# Patient Record
Sex: FEMALE | Race: BLACK OR AFRICAN AMERICAN | Employment: FULL TIME | URBAN - METROPOLITAN AREA
[De-identification: names, ages, dates, MRNs, and addresses within clinical notes are randomized per-mention and may not be internally consistent; named-entity substitution may affect disease eponyms.]

---

## 2017-01-03 ENCOUNTER — HOSPITAL ENCOUNTER (OUTPATIENT)
Dept: PREADMISSION TESTING | Age: 57
Discharge: HOME | End: 2017-01-03
Payer: COMMERCIAL

## 2017-01-03 ENCOUNTER — HOSPITAL ENCOUNTER (OUTPATIENT)
Dept: GENERAL RADIOLOGY | Age: 57
Discharge: HOME | End: 2017-01-03
Payer: COMMERCIAL

## 2017-01-03 VITALS
DIASTOLIC BLOOD PRESSURE: 73 MMHG | WEIGHT: 228 LBS | HEART RATE: 72 BPM | SYSTOLIC BLOOD PRESSURE: 113 MMHG | BODY MASS INDEX: 36.64 KG/M2 | TEMPERATURE: 97.8 F | HEIGHT: 66 IN

## 2017-01-03 LAB
ABO + RH BLD: NORMAL
ALBUMIN SERPL BCP-MCNC: 3.4 G/DL (ref 3.5–5)
ALBUMIN/GLOB SERPL: 1 {RATIO} (ref 1.1–2.2)
ALP SERPL-CCNC: 105 U/L (ref 45–117)
ALT SERPL-CCNC: 26 U/L (ref 12–78)
ANION GAP BLD CALC-SCNC: 7 MMOL/L (ref 5–15)
AST SERPL W P-5'-P-CCNC: 17 U/L (ref 15–37)
ATRIAL RATE: 69 BPM
BASOPHILS # BLD AUTO: 0 K/UL (ref 0–0.1)
BASOPHILS # BLD: 1 % (ref 0–1)
BILIRUB SERPL-MCNC: 0.4 MG/DL (ref 0.2–1)
BLOOD GROUP ANTIBODIES SERPL: NORMAL
BUN SERPL-MCNC: 12 MG/DL (ref 6–20)
BUN/CREAT SERPL: 17 (ref 12–20)
CALCIUM SERPL-MCNC: 8.8 MG/DL (ref 8.5–10.1)
CALCULATED P AXIS, ECG09: 32 DEGREES
CALCULATED R AXIS, ECG10: -19 DEGREES
CALCULATED T AXIS, ECG11: -28 DEGREES
CHLORIDE SERPL-SCNC: 104 MMOL/L (ref 97–108)
CO2 SERPL-SCNC: 28 MMOL/L (ref 21–32)
CREAT SERPL-MCNC: 0.72 MG/DL (ref 0.55–1.02)
DIAGNOSIS, 93000: NORMAL
EOSINOPHIL # BLD: 0.4 K/UL (ref 0–0.4)
EOSINOPHIL NFR BLD: 6 % (ref 0–7)
ERYTHROCYTE [DISTWIDTH] IN BLOOD BY AUTOMATED COUNT: 12.8 % (ref 11.5–14.5)
GLOBULIN SER CALC-MCNC: 3.3 G/DL (ref 2–4)
GLUCOSE SERPL-MCNC: 127 MG/DL (ref 65–100)
HCT VFR BLD AUTO: 34.1 % (ref 35–47)
HGB BLD-MCNC: 10.6 G/DL (ref 11.5–16)
LYMPHOCYTES # BLD AUTO: 18 % (ref 12–49)
LYMPHOCYTES # BLD: 1.2 K/UL (ref 0.8–3.5)
MCH RBC QN AUTO: 27 PG (ref 26–34)
MCHC RBC AUTO-ENTMCNC: 31.1 G/DL (ref 30–36.5)
MCV RBC AUTO: 87 FL (ref 80–99)
MONOCYTES # BLD: 0.4 K/UL (ref 0–1)
MONOCYTES NFR BLD AUTO: 6 % (ref 5–13)
NEUTS SEG # BLD: 4.8 K/UL (ref 1.8–8)
NEUTS SEG NFR BLD AUTO: 69 % (ref 32–75)
P-R INTERVAL, ECG05: 192 MS
PLATELET # BLD AUTO: 354 K/UL (ref 150–400)
POTASSIUM SERPL-SCNC: 4.1 MMOL/L (ref 3.5–5.1)
PROT SERPL-MCNC: 6.7 G/DL (ref 6.4–8.2)
Q-T INTERVAL, ECG07: 426 MS
QRS DURATION, ECG06: 98 MS
QTC CALCULATION (BEZET), ECG08: 456 MS
RBC # BLD AUTO: 3.92 M/UL (ref 3.8–5.2)
SODIUM SERPL-SCNC: 139 MMOL/L (ref 136–145)
SPECIMEN EXP DATE BLD: NORMAL
VENTRICULAR RATE, ECG03: 69 BPM
WBC # BLD AUTO: 6.8 K/UL (ref 3.6–11)

## 2017-01-03 PROCEDURE — 36415 COLL VENOUS BLD VENIPUNCTURE: CPT | Performed by: SPECIALIST

## 2017-01-03 PROCEDURE — 93005 ELECTROCARDIOGRAM TRACING: CPT

## 2017-01-03 PROCEDURE — 80053 COMPREHEN METABOLIC PANEL: CPT | Performed by: SPECIALIST

## 2017-01-03 PROCEDURE — 71020 XR CHEST PA LAT: CPT

## 2017-01-03 PROCEDURE — 85025 COMPLETE CBC W/AUTO DIFF WBC: CPT | Performed by: SPECIALIST

## 2017-01-03 PROCEDURE — 86900 BLOOD TYPING SEROLOGIC ABO: CPT | Performed by: SPECIALIST

## 2017-01-03 RX ORDER — MELATONIN
1000 DAILY
COMMUNITY
End: 2017-02-21

## 2017-01-03 RX ORDER — IBUPROFEN 200 MG
200 TABLET ORAL
COMMUNITY
End: 2018-01-24

## 2017-01-03 RX ORDER — ACETAMINOPHEN 325 MG/1
TABLET ORAL
COMMUNITY
End: 2017-02-21

## 2017-01-03 RX ORDER — FERROUS SULFATE 324(65)MG
324 TABLET, DELAYED RELEASE (ENTERIC COATED) ORAL 2 TIMES DAILY WITH MEALS
COMMUNITY
End: 2019-12-19

## 2017-01-03 RX ORDER — BISACODYL 5 MG
5 TABLET, DELAYED RELEASE (ENTERIC COATED) ORAL DAILY
COMMUNITY
End: 2018-11-28

## 2017-01-03 NOTE — PERIOP NOTES
PATIENT GIVEN SURGICAL SITE INFECTION FAQ HANDOUT AND HAND WASHING TIP SHEET. PREOP INSTRUCTIONS REVIEWED AND PATIENT VERBALIZES UNDERSTANDING OF INSTRUCTIONS. PATIENT HAS BEEN GIVEN THE OPPORTUNITY TO ASK ADDITIONAL QUESTIONS. GAVE 2 PACKAGES OF CHG WIPES AND INSTRUCTIONS.

## 2017-01-10 ENCOUNTER — ANESTHESIA (OUTPATIENT)
Dept: SURGERY | Age: 57
End: 2017-01-10
Payer: COMMERCIAL

## 2017-01-10 ENCOUNTER — SURGERY (OUTPATIENT)
Age: 57
End: 2017-01-10

## 2017-01-10 ENCOUNTER — ANESTHESIA EVENT (OUTPATIENT)
Dept: SURGERY | Age: 57
End: 2017-01-10
Payer: COMMERCIAL

## 2017-01-10 PROCEDURE — 77030026438 HC STYL ET INTUB CARD -A: Performed by: ANESTHESIOLOGY

## 2017-01-10 PROCEDURE — 77030008684 HC TU ET CUF COVD -B: Performed by: ANESTHESIOLOGY

## 2017-01-10 PROCEDURE — 74011000250 HC RX REV CODE- 250

## 2017-01-10 PROCEDURE — 77030013079 HC BLNKT BAIR HGGR 3M -A: Performed by: ANESTHESIOLOGY

## 2017-01-10 PROCEDURE — 74011250636 HC RX REV CODE- 250/636: Performed by: SPECIALIST

## 2017-01-10 PROCEDURE — 74011250636 HC RX REV CODE- 250/636

## 2017-01-10 RX ORDER — PROPOFOL 10 MG/ML
INJECTION, EMULSION INTRAVENOUS AS NEEDED
Status: DISCONTINUED | OUTPATIENT
Start: 2017-01-10 | End: 2017-01-10 | Stop reason: HOSPADM

## 2017-01-10 RX ORDER — SODIUM CHLORIDE, SODIUM LACTATE, POTASSIUM CHLORIDE, CALCIUM CHLORIDE 600; 310; 30; 20 MG/100ML; MG/100ML; MG/100ML; MG/100ML
INJECTION, SOLUTION INTRAVENOUS
Status: DISCONTINUED | OUTPATIENT
Start: 2017-01-10 | End: 2017-01-10 | Stop reason: HOSPADM

## 2017-01-10 RX ORDER — SUCCINYLCHOLINE CHLORIDE 20 MG/ML
INJECTION INTRAMUSCULAR; INTRAVENOUS AS NEEDED
Status: DISCONTINUED | OUTPATIENT
Start: 2017-01-10 | End: 2017-01-10 | Stop reason: HOSPADM

## 2017-01-10 RX ORDER — GLYCOPYRROLATE 0.2 MG/ML
INJECTION INTRAMUSCULAR; INTRAVENOUS AS NEEDED
Status: DISCONTINUED | OUTPATIENT
Start: 2017-01-10 | End: 2017-01-10 | Stop reason: HOSPADM

## 2017-01-10 RX ORDER — KETOROLAC TROMETHAMINE 30 MG/ML
INJECTION, SOLUTION INTRAMUSCULAR; INTRAVENOUS AS NEEDED
Status: DISCONTINUED | OUTPATIENT
Start: 2017-01-10 | End: 2017-01-10 | Stop reason: HOSPADM

## 2017-01-10 RX ORDER — LIDOCAINE HYDROCHLORIDE 20 MG/ML
INJECTION, SOLUTION EPIDURAL; INFILTRATION; INTRACAUDAL; PERINEURAL AS NEEDED
Status: DISCONTINUED | OUTPATIENT
Start: 2017-01-10 | End: 2017-01-10 | Stop reason: HOSPADM

## 2017-01-10 RX ORDER — ROCURONIUM BROMIDE 10 MG/ML
INJECTION, SOLUTION INTRAVENOUS AS NEEDED
Status: DISCONTINUED | OUTPATIENT
Start: 2017-01-10 | End: 2017-01-10 | Stop reason: HOSPADM

## 2017-01-10 RX ORDER — FENTANYL CITRATE 50 UG/ML
INJECTION, SOLUTION INTRAMUSCULAR; INTRAVENOUS AS NEEDED
Status: DISCONTINUED | OUTPATIENT
Start: 2017-01-10 | End: 2017-01-10 | Stop reason: HOSPADM

## 2017-01-10 RX ORDER — MIDAZOLAM HYDROCHLORIDE 1 MG/ML
INJECTION, SOLUTION INTRAMUSCULAR; INTRAVENOUS AS NEEDED
Status: DISCONTINUED | OUTPATIENT
Start: 2017-01-10 | End: 2017-01-10 | Stop reason: HOSPADM

## 2017-01-10 RX ORDER — ONDANSETRON 2 MG/ML
INJECTION INTRAMUSCULAR; INTRAVENOUS AS NEEDED
Status: DISCONTINUED | OUTPATIENT
Start: 2017-01-10 | End: 2017-01-10 | Stop reason: HOSPADM

## 2017-01-10 RX ORDER — METRONIDAZOLE 500 MG/100ML
INJECTION, SOLUTION INTRAVENOUS AS NEEDED
Status: DISCONTINUED | OUTPATIENT
Start: 2017-01-10 | End: 2017-01-10 | Stop reason: HOSPADM

## 2017-01-10 RX ORDER — HYDROMORPHONE HYDROCHLORIDE 1 MG/ML
INJECTION, SOLUTION INTRAMUSCULAR; INTRAVENOUS; SUBCUTANEOUS AS NEEDED
Status: DISCONTINUED | OUTPATIENT
Start: 2017-01-10 | End: 2017-01-10 | Stop reason: HOSPADM

## 2017-01-10 RX ORDER — NEOSTIGMINE METHYLSULFATE 1 MG/ML
INJECTION INTRAVENOUS AS NEEDED
Status: DISCONTINUED | OUTPATIENT
Start: 2017-01-10 | End: 2017-01-10 | Stop reason: HOSPADM

## 2017-01-10 RX ORDER — DEXAMETHASONE SODIUM PHOSPHATE 4 MG/ML
INJECTION, SOLUTION INTRA-ARTICULAR; INTRALESIONAL; INTRAMUSCULAR; INTRAVENOUS; SOFT TISSUE AS NEEDED
Status: DISCONTINUED | OUTPATIENT
Start: 2017-01-10 | End: 2017-01-10 | Stop reason: HOSPADM

## 2017-01-10 RX ADMIN — KETOROLAC TROMETHAMINE 30 MG: 30 INJECTION, SOLUTION INTRAMUSCULAR; INTRAVENOUS at 11:16

## 2017-01-10 RX ADMIN — ROCURONIUM BROMIDE 5 MG: 10 INJECTION, SOLUTION INTRAVENOUS at 09:47

## 2017-01-10 RX ADMIN — CEFAZOLIN 2 G: 1 INJECTION, POWDER, FOR SOLUTION INTRAMUSCULAR; INTRAVENOUS; PARENTERAL at 09:55

## 2017-01-10 RX ADMIN — GLYCOPYRROLATE 0.6 MG: 0.2 INJECTION INTRAMUSCULAR; INTRAVENOUS at 11:22

## 2017-01-10 RX ADMIN — SUCCINYLCHOLINE CHLORIDE 160 MG: 20 INJECTION INTRAMUSCULAR; INTRAVENOUS at 09:48

## 2017-01-10 RX ADMIN — METRONIDAZOLE 500 MG: 500 INJECTION, SOLUTION INTRAVENOUS at 09:55

## 2017-01-10 RX ADMIN — NEOSTIGMINE METHYLSULFATE 4 MG: 1 INJECTION INTRAVENOUS at 11:22

## 2017-01-10 RX ADMIN — FENTANYL CITRATE 100 MCG: 50 INJECTION, SOLUTION INTRAMUSCULAR; INTRAVENOUS at 09:47

## 2017-01-10 RX ADMIN — MIDAZOLAM HYDROCHLORIDE 2 MG: 1 INJECTION, SOLUTION INTRAMUSCULAR; INTRAVENOUS at 09:39

## 2017-01-10 RX ADMIN — PROPOFOL 150 MG: 10 INJECTION, EMULSION INTRAVENOUS at 09:47

## 2017-01-10 RX ADMIN — HYDROMORPHONE HYDROCHLORIDE 0.5 MG: 1 INJECTION, SOLUTION INTRAMUSCULAR; INTRAVENOUS; SUBCUTANEOUS at 11:18

## 2017-01-10 RX ADMIN — LIDOCAINE HYDROCHLORIDE 80 MG: 20 INJECTION, SOLUTION EPIDURAL; INFILTRATION; INTRACAUDAL; PERINEURAL at 09:47

## 2017-01-10 RX ADMIN — DEXAMETHASONE SODIUM PHOSPHATE 8 MG: 4 INJECTION, SOLUTION INTRA-ARTICULAR; INTRALESIONAL; INTRAMUSCULAR; INTRAVENOUS; SOFT TISSUE at 09:55

## 2017-01-10 RX ADMIN — ROCURONIUM BROMIDE 10 MG: 10 INJECTION, SOLUTION INTRAVENOUS at 10:37

## 2017-01-10 RX ADMIN — ONDANSETRON 4 MG: 2 INJECTION INTRAMUSCULAR; INTRAVENOUS at 11:16

## 2017-01-10 RX ADMIN — SODIUM CHLORIDE, SODIUM LACTATE, POTASSIUM CHLORIDE, CALCIUM CHLORIDE: 600; 310; 30; 20 INJECTION, SOLUTION INTRAVENOUS at 09:32

## 2017-01-10 RX ADMIN — HYDROMORPHONE HYDROCHLORIDE 0.5 MG: 1 INJECTION, SOLUTION INTRAMUSCULAR; INTRAVENOUS; SUBCUTANEOUS at 11:16

## 2017-01-10 RX ADMIN — BUPIVACAINE HYDROCHLORIDE 30 ML: 5 INJECTION, SOLUTION EPIDURAL; INTRACAUDAL; PERINEURAL at 11:13

## 2017-01-10 RX ADMIN — ROCURONIUM BROMIDE 25 MG: 10 INJECTION, SOLUTION INTRAVENOUS at 09:55

## 2017-01-10 NOTE — ANESTHESIA POSTPROCEDURE EVALUATION
Post-Anesthesia Evaluation and Assessment    Patient: Lou Morales MRN: [id number]  SSN: [id number]    YOB: 1960  Age: 56 y.o. Sex: female       Cardiovascular Function/Vital Signs  Visit Vitals    /62    Pulse 61    Temp 36.8 °C (98.3 °F)    Resp 13    Ht 5' 6\" (1.676 m)    Wt 103.4 kg (228 lb)    SpO2 100%    BMI 36.8 kg/m2       Patient is status post general anesthesia for Procedure(s):  DAVINCI TOTAL LAPAROSCOPIC HYSTERECTOMY, BILATERAL SALPINGO-OOPHERECTOMY. Nausea/Vomiting: None    Postoperative hydration reviewed and adequate. Pain:  Pain Scale 1: Numeric (0 - 10) (01/10/17 1145)  Pain Intensity 1: 0 (01/10/17 1145)   Managed    Neurological Status:   Neuro (WDL): Within Defined Limits (01/10/17 1145)  Neuro  LUE Motor Response: Purposeful (01/10/17 1145)  LLE Motor Response: Purposeful (01/10/17 1145)  RUE Motor Response: Purposeful (01/10/17 1145)  RLE Motor Response: Purposeful (01/10/17 1145)   At baseline    Mental Status and Level of Consciousness: Arousable    Pulmonary Status:   O2 Device: Nasal cannula (01/10/17 1145)   Adequate oxygenation and airway patent    Complications related to anesthesia: None    Post-anesthesia assessment completed.  No concerns    Signed By: Fauzia Chaves MD     January 10, 2017

## 2017-01-10 NOTE — ANESTHESIA PREPROCEDURE EVALUATION
Anesthetic History   No history of anesthetic complications            Review of Systems / Medical History  Patient summary reviewed, nursing notes reviewed and pertinent labs reviewed    Pulmonary  Within defined limits                 Neuro/Psych   Within defined limits      Psychiatric history     Cardiovascular  Within defined limits                Exercise tolerance: >4 METS     GI/Hepatic/Renal  Within defined limits              Endo/Other  Within defined limits           Other Findings              Physical Exam    Airway  Mallampati: II  TM Distance: 4 - 6 cm  Neck ROM: normal range of motion   Mouth opening: Normal     Cardiovascular  Regular rate and rhythm,  S1 and S2 normal,  no murmur, click, rub, or gallop             Dental  No notable dental hx       Pulmonary  Breath sounds clear to auscultation               Abdominal  GI exam deferred       Other Findings            Anesthetic Plan    ASA: 2  Anesthesia type: general

## 2017-01-28 ENCOUNTER — HOSPITAL ENCOUNTER (EMERGENCY)
Age: 57
Discharge: HOME | End: 2017-01-28
Attending: EMERGENCY MEDICINE
Payer: COMMERCIAL

## 2017-01-28 ENCOUNTER — APPOINTMENT (OUTPATIENT)
Dept: GENERAL RADIOLOGY | Age: 57
End: 2017-01-28
Attending: EMERGENCY MEDICINE
Payer: COMMERCIAL

## 2017-01-28 VITALS
HEIGHT: 66 IN | HEART RATE: 62 BPM | DIASTOLIC BLOOD PRESSURE: 84 MMHG | RESPIRATION RATE: 18 BRPM | BODY MASS INDEX: 36.16 KG/M2 | OXYGEN SATURATION: 98 % | WEIGHT: 225 LBS | TEMPERATURE: 98 F | SYSTOLIC BLOOD PRESSURE: 132 MMHG

## 2017-01-28 DIAGNOSIS — K59.03 DRUG-INDUCED CONSTIPATION: Primary | ICD-10-CM

## 2017-01-28 LAB
ALBUMIN SERPL BCP-MCNC: 3.5 G/DL (ref 3.5–5)
ALBUMIN/GLOB SERPL: 1 {RATIO} (ref 1.1–2.2)
ALP SERPL-CCNC: 111 U/L (ref 45–117)
ALT SERPL-CCNC: 27 U/L (ref 12–78)
ANION GAP BLD CALC-SCNC: 6 MMOL/L (ref 5–15)
AST SERPL W P-5'-P-CCNC: 21 U/L (ref 15–37)
BASOPHILS # BLD AUTO: 0.1 K/UL (ref 0–0.1)
BASOPHILS # BLD: 1 % (ref 0–1)
BILIRUB SERPL-MCNC: 0.3 MG/DL (ref 0.2–1)
BUN SERPL-MCNC: 15 MG/DL (ref 6–20)
BUN/CREAT SERPL: 16 (ref 12–20)
CALCIUM SERPL-MCNC: 8.9 MG/DL (ref 8.5–10.1)
CHLORIDE SERPL-SCNC: 104 MMOL/L (ref 97–108)
CO2 SERPL-SCNC: 27 MMOL/L (ref 21–32)
CREAT SERPL-MCNC: 0.95 MG/DL (ref 0.55–1.02)
DIFFERENTIAL METHOD BLD: ABNORMAL
EOSINOPHIL # BLD: 1.5 K/UL (ref 0–0.4)
EOSINOPHIL NFR BLD: 16 % (ref 0–7)
ERYTHROCYTE [DISTWIDTH] IN BLOOD BY AUTOMATED COUNT: 12.4 % (ref 11.5–14.5)
GLOBULIN SER CALC-MCNC: 3.5 G/DL (ref 2–4)
GLUCOSE SERPL-MCNC: 108 MG/DL (ref 65–100)
HCT VFR BLD AUTO: 34.2 % (ref 35–47)
HEMOCCULT STL QL: NEGATIVE
HGB BLD-MCNC: 10.6 G/DL (ref 11.5–16)
LIPASE SERPL-CCNC: 124 U/L (ref 73–393)
LYMPHOCYTES # BLD AUTO: 21 % (ref 12–49)
LYMPHOCYTES # BLD: 2 K/UL (ref 0.8–3.5)
MCH RBC QN AUTO: 26.4 PG (ref 26–34)
MCHC RBC AUTO-ENTMCNC: 31 G/DL (ref 30–36.5)
MCV RBC AUTO: 85.3 FL (ref 80–99)
MONOCYTES # BLD: 0.7 K/UL (ref 0–1)
MONOCYTES NFR BLD AUTO: 7 % (ref 5–13)
NEUTS SEG # BLD: 5.1 K/UL (ref 1.8–8)
NEUTS SEG NFR BLD AUTO: 55 % (ref 32–75)
PLATELET # BLD AUTO: 386 K/UL (ref 150–400)
PLATELET COMMENTS,PCOM: ABNORMAL
POTASSIUM SERPL-SCNC: 3.6 MMOL/L (ref 3.5–5.1)
PROT SERPL-MCNC: 7 G/DL (ref 6.4–8.2)
RBC # BLD AUTO: 4.01 M/UL (ref 3.8–5.2)
RBC MORPH BLD: ABNORMAL
SODIUM SERPL-SCNC: 137 MMOL/L (ref 136–145)
WBC # BLD AUTO: 9.4 K/UL (ref 3.6–11)
WBC MORPH BLD: ABNORMAL

## 2017-01-28 PROCEDURE — 74020 XR ABD FLAT/ ERECT: CPT

## 2017-01-28 PROCEDURE — 80053 COMPREHEN METABOLIC PANEL: CPT | Performed by: EMERGENCY MEDICINE

## 2017-01-28 PROCEDURE — 99284 EMERGENCY DEPT VISIT MOD MDM: CPT

## 2017-01-28 PROCEDURE — 82270 OCCULT BLOOD FECES: CPT

## 2017-01-28 PROCEDURE — 85025 COMPLETE CBC W/AUTO DIFF WBC: CPT | Performed by: EMERGENCY MEDICINE

## 2017-01-28 PROCEDURE — 36415 COLL VENOUS BLD VENIPUNCTURE: CPT | Performed by: EMERGENCY MEDICINE

## 2017-01-28 PROCEDURE — 74011250637 HC RX REV CODE- 250/637: Performed by: EMERGENCY MEDICINE

## 2017-01-28 PROCEDURE — 83690 ASSAY OF LIPASE: CPT | Performed by: EMERGENCY MEDICINE

## 2017-01-28 RX ORDER — DICYCLOMINE HYDROCHLORIDE 10 MG/1
10 CAPSULE ORAL
Status: COMPLETED | OUTPATIENT
Start: 2017-01-28 | End: 2017-01-28

## 2017-01-28 RX ORDER — POLYETHYLENE GLYCOL 3350 17 G/17G
17 POWDER, FOR SOLUTION ORAL DAILY
Qty: 510 G | Refills: 0 | Status: SHIPPED | OUTPATIENT
Start: 2017-01-28 | End: 2017-02-21

## 2017-01-28 RX ORDER — ONDANSETRON 4 MG/1
4 TABLET, ORALLY DISINTEGRATING ORAL
COMMUNITY
End: 2017-02-21

## 2017-01-28 RX ORDER — METRONIDAZOLE 500 MG/1
500 TABLET ORAL 3 TIMES DAILY
COMMUNITY
End: 2017-02-21

## 2017-01-28 RX ADMIN — DICYCLOMINE HYDROCHLORIDE 10 MG: 10 CAPSULE ORAL at 22:06

## 2017-01-29 NOTE — ED PROVIDER NOTES
HPI Comments: 56 y.o. female with past medical history significant for anemia, anxiety, cholecystectomy, and hysterectomy who presents via EMS with chief complaint of abdominal pain. Patient arrives complaining of constipation for 2 weeks after hysterectomy. Patient reports last bowel movement was about 2-3 days ago. Abdominal pain worsens when attempting to pass stool. Patient has been taking stool softeners with no relief. Patient reports she had to manually assist stool out of her rectum today. Patient reports seeing a small amount of bright red blood in stool. Patient reports abdominal pain is localized in lower abdomen with severe cramping. Patient denies nausea and vomiting. There are no other acute medical concerns at this time. PCP: None    Note written by Larissa Coy. Angelo Cooper, as dictated by Martha Mi MD 9:29 PM      The history is provided by the patient. Past Medical History:   Diagnosis Date    Adverse effect of anesthesia      WITHIN 20-30 MINUTES AFTER WAKING UP FROM CARPAL TUNNEL SURGERY, HAD DIFFICULTY BREATHING ,     Anemia     Anxiety        Past Surgical History:   Procedure Laterality Date    Hx lap cholecystectomy      Hx orthopaedic Left 2015     SPUR REMOVED TO LEFT FOOT    Hx orthopaedic Left      SPIN AND SCREW IN LEFT ARM    Hx  section      Hx carpal tunnel release Bilateral     Hx hysterectomy           Family History:   Problem Relation Age of Onset    Stroke Mother     Diabetes Mother     Other Mother      BRAIN ANURYSM     Diabetes Father     Kidney Disease Father     Hypertension Father     Anesth Problems Neg Hx        Social History     Social History    Marital status:      Spouse name: N/A    Number of children: N/A    Years of education: N/A     Occupational History    Not on file.      Social History Main Topics    Smoking status: Former Smoker     Packs/day: 0.25     Years: 5.00     Quit date: 1/3/2000    Smokeless tobacco: Never Used    Alcohol use Yes      Comment: OCCASSIONAL    Drug use: No    Sexual activity: Not on file     Other Topics Concern    Not on file     Social History Narrative         ALLERGIES: Percocet [oxycodone-acetaminophen]    Review of Systems   Gastrointestinal: Positive for abdominal pain, blood in stool and constipation. Negative for nausea and vomiting. All other systems reviewed and are negative. Vitals:    01/28/17 2047   BP: 134/71   Pulse: 62   Resp: 18   Temp: 97.9 °F (36.6 °C)   SpO2: 95%   Weight: 102.1 kg (225 lb)   Height: 5' 6\" (1.676 m)            Physical Exam   Constitutional: She is oriented to person, place, and time. She appears well-developed and well-nourished. No distress. Obese   HENT:   Head: Normocephalic and atraumatic. Eyes: Conjunctivae are normal. No scleral icterus. Neck: Neck supple. No tracheal deviation present. Cardiovascular: Normal rate, regular rhythm, normal heart sounds and intact distal pulses. Exam reveals no gallop and no friction rub. No murmur heard. Pulmonary/Chest: Effort normal and breath sounds normal. She has no wheezes. She has no rales. Abdominal: Soft. She exhibits no distension. There is tenderness (minimal). There is no rebound and no guarding. Healed surgical scars over abdomen, no infection noted. Musculoskeletal: She exhibits no edema. Neurological: She is alert and oriented to person, place, and time. Skin: Skin is warm and dry. No rash noted. There is pallor. Psychiatric: She has a normal mood and affect. Nursing note and vitals reviewed. [facility]  ED Course       Procedures      Assessment and plan       the patient had multiple large bowel movements are in her cramping abdominal pain eventually got much improved. Laboratory studies were essentially normal and a flat and upright abdomen was also normal. I will discharge her with MiraLax for her opiate induced severe constipation.

## 2017-01-29 NOTE — ED TRIAGE NOTES
PT arrived by EMS from home. Pt is A&OX3 airway patent resting on str without signs of distress. Per EMS pt is two weeks s/p hysterectomy and today complains of lower abd pain. EMS notes that pt's pain worsened while on the toilet attempting to pass a large stool. Pt notes that she had to manually assist the stool out of her rectum. Pt notes that she saw some bright red blood in her stool. Pt notes that she had taken stool softeners today with scant results. Pt reports that she hasn't had a BM in at least 2-3 days.

## 2017-01-29 NOTE — ED NOTES
MD reviewed discharge instructions with the patient. The patient verbalized understanding. Patient was wheeled out of the emergency department by staff. Patient remains in pain, but is in no apparent distress.

## 2017-01-29 NOTE — DISCHARGE INSTRUCTIONS
We hope that we have addressed all of your medical concerns. The examination and treatment you received in the Emergency Department were for an emergent problem and were not intended as complete care. It is important that you follow up with your healthcare provider(s) for ongoing care. If your symptoms worsen or do not improve as expected, and you are unable to reach your usual health care provider(s), you should return to the Emergency Department. Today's healthcare is undergoing tremendous change, and patient satisfaction surveys are one of the many tools to assess the quality of medical care. You may receive a survey from the [employer] regarding your experience in the Emergency Department. I hope that your experience has been completely positive, particularly the medical care that I provided. As such, please participate in the survey; anything less than excellent does not meet my expectations or intentions. [facility] and [address] [place] [address] participate in nationally recognized quality of care measures. If your blood pressure is greater than 120/80, as reported below, we urge that you seek medical care to address the potential of high blood pressure, commonly known as hypertension. Hypertension can be hereditary or can be caused by certain medical conditions, pain, stress, or \"white coat syndrome. \"       Please make an appointment with your health care provider(s) for follow up of your Emergency Department visit. VITALS:   Patient Vitals for the past 8 hrs:   Temp Pulse Resp BP SpO2   01/28/17 2241 - - 18 132/84 98 %   01/28/17 2200 - - 18 114/65 96 %   01/28/17 2100 - - 20 146/71 99 %   01/28/17 2047 97.9 °F (36.6 °C) 62 18 134/71 95 %          Thank you for allowing us to provide you with medical care today. We realize that you have many choices for your emergency care needs.   Please choose us in the future for any continued health care needs. Alix Riedel Jed Inoue, MD    [address] [facility].   Office: [phone]            Recent Results (from the past 24 hour(s))   CBC WITH AUTOMATED DIFF    Collection Time: 01/28/17  8:43 PM   Result Value Ref Range    WBC 9.4 3.6 - 11.0 K/uL    RBC 4.01 3.80 - 5.20 M/uL    HGB 10.6 (L) 11.5 - 16.0 g/dL    HCT 34.2 (L) 35.0 - 47.0 %    MCV 85.3 80.0 - 99.0 FL    MCH 26.4 26.0 - 34.0 PG    MCHC 31.0 30.0 - 36.5 g/dL    RDW 12.4 11.5 - 14.5 %    PLATELET 035 603 - 068 K/uL    NEUTROPHILS 55 32 - 75 %    LYMPHOCYTES 21 12 - 49 %    MONOCYTES 7 5 - 13 %    EOSINOPHILS 16 (H) 0 - 7 %    BASOPHILS 1 0 - 1 %    ABS. NEUTROPHILS 5.1 1.8 - 8.0 K/UL    ABS. LYMPHOCYTES 2.0 0.8 - 3.5 K/UL    ABS. MONOCYTES 0.7 0.0 - 1.0 K/UL    ABS. EOSINOPHILS 1.5 (H) 0.0 - 0.4 K/UL    ABS. BASOPHILS 0.1 0.0 - 0.1 K/UL    DF MANUAL      PLATELET COMMENTS LARGE PLATELETS      RBC COMMENTS POLYCHROMASIA  PRESENT        WBC COMMENTS REACTIVE LYMPHS     LIPASE    Collection Time: 01/28/17  8:43 PM   Result Value Ref Range    Lipase 124 73 - 214 U/L   METABOLIC PANEL, COMPREHENSIVE    Collection Time: 01/28/17  8:43 PM   Result Value Ref Range    Sodium 137 136 - 145 mmol/L    Potassium 3.6 3.5 - 5.1 mmol/L    Chloride 104 97 - 108 mmol/L    CO2 27 21 - 32 mmol/L    Anion gap 6 5 - 15 mmol/L    Glucose 108 (H) 65 - 100 mg/dL    BUN 15 6 - 20 MG/DL    Creatinine 0.95 0.55 - 1.02 MG/DL    BUN/Creatinine ratio 16 12 - 20      GFR est AA >60 >60 ml/min/1.73m2    GFR est non-AA >60 >60 ml/min/1.73m2    Calcium 8.9 8.5 - 10.1 MG/DL    Bilirubin, total 0.3 0.2 - 1.0 MG/DL    ALT 27 12 - 78 U/L    AST 21 15 - 37 U/L    Alk.  phosphatase 111 45 - 117 U/L    Protein, total 7.0 6.4 - 8.2 g/dL    Albumin 3.5 3.5 - 5.0 g/dL    Globulin 3.5 2.0 - 4.0 g/dL    A-G Ratio 1.0 (L) 1.1 - 2.2     POC FECAL OCCULT BLOOD    Collection Time: 01/28/17  9:07 PM   Result Value Ref Range    Occult blood, stool (POC) NEGATIVE  NEG Xr Abd Flat/ Erect    Result Date: 1/28/2017  Exam: 2 view abdomen Indication: Lower abdominal pain today with constipation, hysterectomy 2 weeks ago Supine and upright views of the abdomen demonstrate a normal bowel gas pattern. Lung bases are clear. No free air. Degenerative changes are seen in the hip joints bilaterally and lumbar spine. Surgical clips project over the right upper quadrant. Impression: Normal bowel gas pattern.

## 2017-07-11 ENCOUNTER — APPOINTMENT (OUTPATIENT)
Dept: GENERAL RADIOLOGY | Age: 57
End: 2017-07-11
Payer: COMMERCIAL

## 2017-07-11 ENCOUNTER — HOSPITAL ENCOUNTER (EMERGENCY)
Age: 57
Discharge: HOME | End: 2017-07-11
Attending: EMERGENCY MEDICINE
Payer: COMMERCIAL

## 2017-07-11 VITALS
DIASTOLIC BLOOD PRESSURE: 56 MMHG | WEIGHT: 224.21 LBS | BODY MASS INDEX: 36.03 KG/M2 | OXYGEN SATURATION: 100 % | RESPIRATION RATE: 18 BRPM | SYSTOLIC BLOOD PRESSURE: 137 MMHG | HEIGHT: 66 IN | TEMPERATURE: 97.8 F

## 2017-07-11 DIAGNOSIS — Z98.890 STATUS POST LEFT FOOT SURGERY: ICD-10-CM

## 2017-07-11 DIAGNOSIS — M77.32 CALCANEAL SPUR OF FOOT, LEFT: ICD-10-CM

## 2017-07-11 DIAGNOSIS — M79.672 LEFT FOOT PAIN: Primary | ICD-10-CM

## 2017-07-11 PROCEDURE — 74011250637 HC RX REV CODE- 250/637: Performed by: PHYSICIAN ASSISTANT

## 2017-07-11 PROCEDURE — 73630 X-RAY EXAM OF FOOT: CPT

## 2017-07-11 PROCEDURE — 99283 EMERGENCY DEPT VISIT LOW MDM: CPT

## 2017-07-11 PROCEDURE — 77030036687 HC SHOE PSTOP S2SG -A

## 2017-07-11 RX ORDER — TRAMADOL HYDROCHLORIDE 50 MG/1
50 TABLET ORAL
Qty: 15 TAB | Refills: 0 | Status: SHIPPED | OUTPATIENT
Start: 2017-07-11 | End: 2018-01-24

## 2017-07-11 RX ORDER — TRAMADOL HYDROCHLORIDE 50 MG/1
50 TABLET ORAL
Status: COMPLETED | OUTPATIENT
Start: 2017-07-11 | End: 2017-07-11

## 2017-07-11 RX ADMIN — TRAMADOL HYDROCHLORIDE 50 MG: 50 TABLET, FILM COATED ORAL at 12:19

## 2017-07-11 NOTE — ED NOTES
Pt states \"pain from the heel of my L foot sharp pain for 3-4 days, previously it has been a milder pain. Pain is piercing, sharp, radiating and feels like something is pulling up to the back of my knee. \" Pt states she has spur removed from L heel in Dec 2014.

## 2017-07-11 NOTE — DISCHARGE INSTRUCTIONS
Foot Pain: Care Instructions  Your Care Instructions  Foot injuries that cause pain and swelling are fairly common. Almost all sports or home repair projects can cause a misstep that ends up as foot pain. Normal wear and tear, especially as you get older, also can cause foot pain. Most minor foot injuries will heal on their own, and home treatment is usually all you need to do. If you have a severe injury, you may need tests and treatment. Follow-up care is a key part of your treatment and safety. Be sure to make and go to all appointments, and call your doctor if you are having problems. Its also a good idea to know your test results and keep a list of the medicines you take. How can you care for yourself at home? · Take pain medicines exactly as directed. ¨ If the doctor gave you a prescription medicine for pain, take it as prescribed. ¨ If you are not taking a prescription pain medicine, ask your doctor if you can take an over-the-counter medicine. · Rest and protect your foot. Take a break from any activity that may cause pain. · Put ice or a cold pack on your foot for 10 to 20 minutes at a time. Put a thin cloth between the ice and your skin. · Prop up the sore foot on a pillow when you ice it or anytime you sit or lie down during the next 3 days. Try to keep it above the level of your heart. This will help reduce swelling. · Your doctor may recommend that you wrap your foot with an elastic bandage. Keep your foot wrapped for as long as your doctor advises. · If your doctor recommends crutches, use them as directed. · Wear roomy footwear. · As soon as pain and swelling end, begin gentle exercises of your foot. Your doctor can tell you which exercises will help. When should you call for help? Call 911 anytime you think you may need emergency care. For example, call if:  · Your foot turns pale, white, blue, or cold.   Call your doctor now or seek immediate medical care if:  · You cannot move or stand on your foot. · Your foot looks twisted or out of its normal position. · Your foot is not stable when you step down. · You have signs of infection, such as:  ¨ Increased pain, swelling, warmth, or redness. ¨ Red streaks leading from the sore area. ¨ Pus draining from a place on your foot. ¨ A fever. · Your foot is numb or tingly. Watch closely for changes in your health, and be sure to contact your doctor if:  · You do not get better as expected. · You have bruises from an injury that last longer than 2 weeks. Where can you learn more? Go to http://sanchez-natalya.info/. Enter S786 in the search box to learn more about \"Foot Pain: Care Instructions. \"  Current as of: March 21, 2017  Content Version: 11.3  © 5874-6478 [employer]. Care instructions adapted under license by [employer] (which disclaims liability or warranty for this information). If you have questions about a medical condition or this instruction, always ask your healthcare professional. [address] any warranty or liability for your use of this information. Bone Spur Repair: Before Your Surgery  What is a bone spur repair? A bone spur repair is surgery to remove a bone spur, a bony growth that forms on normal bone. Your doctor will make one or more small cuts near the bone spur. These cuts are called incisions. Then the doctor will use small tools to remove the piece of bone. You may have arthroscopic surgery. It is done using a few small incisions and a lighted viewing tube called an arthroscope. Or the doctor may make one larger incision. This is called open surgery. After surgery, you may have less pain. If your bone spur is in a joint, the joint may move more easily. Removing a bone spur may also help prevent future problems. You will probably go home on the day of surgery or the next day.  It depends on your general health and on what kind of surgery you had. The time it takes to go back to work or your normal routine depends on the type of surgery you had, the type of work you do, and how active you are. Follow-up care is a key part of your treatment and safety. Be sure to make and go to all appointments, and call your doctor if you are having problems. It's also a good idea to know your test results and keep a list of the medicines you take. What happens before surgery? Surgery can be stressful. This information will help you understand what you can expect. And it will help you safely prepare for surgery. Preparing for surgery  · Understand exactly what surgery is planned, along with the risks, benefits, and other options. · Tell your doctors ALL the medicines, vitamins, supplements, and herbal remedies you take. Some of these can increase the risk of bleeding or interact with anesthesia. · If you take blood thinners, such as warfarin (Coumadin), clopidogrel (Plavix), or aspirin, be sure to talk to your doctor. He or she will tell you if you should stop taking these medicines before your surgery. Make sure that you understand exactly what your doctor wants you to do. · Your doctor will tell you which medicines to take or stop before your surgery. You may need to stop taking certain medicines a week or more before surgery. So talk to your doctor as soon as you can. · If you have an advance directive, let your doctor know. It may include a living will and a durable power of  for health care. Bring a copy to the hospital. If you don't have one, you may want to prepare one. It lets your doctor and loved ones know your health care wishes. Doctors advise that everyone prepare these papers before any type of surgery or procedure. What happens on the day of surgery? · Follow the instructions exactly about when to stop eating and drinking. If you don't, your surgery may be canceled.  If your doctor told you to take your medicines on the day of surgery, take them with only a sip of water. · Take a bath or shower before you come in for your surgery. Do not apply lotions, perfumes, deodorants, or nail polish. · Do not shave the surgical site yourself. · Take off all jewelry and piercings. And take out contact lenses, if you wear them. · Wear clothing that is easy to put on and take off. You may have a large bandage over the surgery area. At the hospital or surgery center  · Bring a picture ID. · The area for surgery is often marked to make sure there are no errors. · You will be kept comfortable and safe by your anesthesia provider. The anesthesia may make you sleep. Or it may just numb the area being worked on. · The surgery will take about 1 to 2 hours. Going home  · Be sure you have someone to drive you home. Anesthesia and pain medicine make it unsafe for you to drive. · You will be given more specific instructions about recovering from your surgery. They will cover things like diet, wound care, follow-up care, driving, and getting back to your normal routine. When should you call your doctor? · You have questions or concerns. · You don't understand how to prepare for your surgery. · You become ill before the surgery (such as fever, flu, or a cold). · You need to reschedule or have changed your mind about having the surgery. Where can you learn more? Go to http://sanchez-natalya.info/. Enter E932 in the search box to learn more about \"Bone Spur Repair: Before Your Surgery. \"  Current as of: March 21, 2017  Content Version: 11.3  © 7176-1448 [employer]. Care instructions adapted under license by [employer] (which disclaims liability or warranty for this information). If you have questions about a medical condition or this instruction, always ask your healthcare professional. [address] any warranty or liability for your use of this information.

## 2017-07-11 NOTE — LETTER
[address] 
\[facility] [place] [facility]\"" EMERGENCY DEPT 
[address] [facility] [id number] [phone] Work/School Note Date: 7/11/2017 To Whom It May concern: 
 
Chelo Calzada was seen and treated today in the emergency room. She may return to work in 2 to 3 days, as symptoms improve. Sincerely, Hernan Ocampo

## 2017-07-11 NOTE — ED PROVIDER NOTES
HPI Comments: Cara Hawley is a 56 y.o. female with PMHx significant for osteoarthritis in the left heel and a spur removal for the left heel last year, who presents ambulatory to [facility] ED with cc of sharp left heel pain that radiates up her knee for 4 days. Pt describes her pain as starting from the bottom of her heel \"shooting up inside (her) knee. ..that is piercing, pulling\". She states that her pain feels similar to when she had her last spur, but in a different location. She has not had any recent injury or trauma, and takes Toradol for her pain. Pt denies any fever. PCP:Nancy Patricia Fleischer, MD    Social Hx: - smoking; + EtOH (0.6 oz/week); - illicit drug use    There are no other complains, changes, or physical findings at this time. The history is provided by the patient.         Past Medical History:   Diagnosis Date    Adverse effect of anesthesia     WITHIN 20-30 MINUTES AFTER WAKING UP FROM CARPAL TUNNEL SURGERY, HAD DIFFICULTY BREATHING ,     Anemia     Anxiety     Glucose intolerance (impaired glucose tolerance)        Past Surgical History:   Procedure Laterality Date    HX CARPAL TUNNEL RELEASE Bilateral     HX  SECTION      HX HYSTERECTOMY      HX LAP CHOLECYSTECTOMY      HX ORTHOPAEDIC Left 2015    SPUR REMOVED TO LEFT FOOT    HX ORTHOPAEDIC Left     SPIN AND SCREW IN LEFT ARM         Family History:   Problem Relation Age of Onset    Stroke Mother     Diabetes Mother     Other Mother      BRAIN ANURYSM     Diabetes Father     Kidney Disease Father     Hypertension Father     Diabetes Sister     Alzheimer Maternal Grandmother     Anesth Problems Neg Hx        Social History     Social History    Marital status:      Spouse name: N/A    Number of children: N/A    Years of education: N/A     Occupational History    supervisor      [employer]     Social History Main Topics    Smoking status: Former Smoker     Packs/day: 0.25     Years: 5.00     Quit date: 1/3/2000    Smokeless tobacco: Never Used    Alcohol use 0.6 oz/week     1 Standard drinks or equivalent per week      Comment: OCCASSIONAL    Drug use: No    Sexual activity: Not on file     Other Topics Concern    Not on file     Social History Narrative         ALLERGIES: Percocet [oxycodone-acetaminophen]    Review of Systems   Constitutional: Negative for chills and fever. HENT: Negative for congestion, rhinorrhea and sore throat. Respiratory: Negative for cough and shortness of breath. Cardiovascular: Negative for chest pain and palpitations. Gastrointestinal: Negative for diarrhea, nausea and vomiting. Musculoskeletal: Positive for arthralgias (left foot) and myalgias (left knee). Skin: Negative for rash and wound. Neurological: Negative for dizziness, weakness, numbness and headaches. Psychiatric/Behavioral: Negative for agitation and confusion. Vitals:    07/11/17 1052   BP: 137/56   Resp: 18   Temp: 97.8 °F (36.6 °C)   SpO2: 100%   Weight: 101.7 kg (224 lb 3.3 oz)   Height: 5' 6\" (1.676 m)            Physical Exam   Constitutional: She is oriented to person, place, and time. She appears well-developed and well-nourished. No distress. Pt is alert. HENT:   Head: Normocephalic and atraumatic. Nose: Nose normal.   Mouth/Throat: Oropharynx is clear and moist. No oropharyngeal exudate. Eyes: Conjunctivae and EOM are normal. Right eye exhibits no discharge. Left eye exhibits no discharge. No scleral icterus. Neck: Normal range of motion. Neck supple. No JVD present. No tracheal deviation present. No thyromegaly present. Cardiovascular: Normal rate, regular rhythm and normal heart sounds. Pulses:       Dorsalis pedis pulses are 2+ on the right side, and 2+ on the left side. NVI. Pulmonary/Chest: Effort normal and breath sounds normal. No respiratory distress. She has no wheezes. Abdominal: Soft. There is no tenderness. Musculoskeletal: She exhibits no edema. Left ankle: No tenderness. Left foot: There is decreased range of motion, tenderness and swelling. Decr A/P ROM in the left foot due to tenderness, left calcaneus spur, well healed surgical incision noted. Mild to moderate soft tissue swelling noted. No erythema or heat. Lymphadenopathy:     She has no cervical adenopathy. Neurological: She is alert and oriented to person, place, and time. She exhibits normal muscle tone. Coordination normal.   Skin: Skin is warm and dry. She is not diaphoretic. Psychiatric: She has a normal mood and affect. Her behavior is normal. Judgment normal.   Nursing note and vitals reviewed. MDM  Number of Diagnoses or Management Options  Calcaneal spur of foot, left:   Left foot pain:   Status post left foot surgery:   Diagnosis management comments: DDx: fracture, spur, OA       Amount and/or Complexity of Data Reviewed  Tests in the radiology section of CPT®: ordered and reviewed  Review and summarize past medical records: yes    Patient Progress  Patient progress: stable    ED Course       Procedures    IMAGING RESULTS:   EXAM:  XR FOOT LT MIN 3 V     INDICATION:   Left foot pain at the heel for 3 days. Left foot calcaneal spur  surgery in 2015. No injury.     COMPARISON:  None.     FINDINGS:  Three views of the left foot demonstrate no fracture or other acute  osseous or articular abnormality. Plantar calcaneal spur appears chronic. No  erosion. Distal Achilles tendon contains calcifications. There is also  periosteal calcification of the proximal fifth metatarsal. Intertarsal  osteoarthritis is moderate for age. First MTP joint osteoarthritis is moderate. Soft tissue calcification is proximal to the fibular hallux sesamoid.     Heel pad soft tissues are thickened. No radiodense foreign body.     IMPRESSION  IMPRESSION:    1. Thickened heel pad soft tissues. No radiodense foreign body or gas. 2. Plantar calcaneal spur. No erosion.   3. Distal Achilles calcific tendinosis. 4. Multifocal osteoarthritis.                   MEDICATIONS GIVEN:  Medications   traMADol (ULTRAM) tablet 50 mg (not administered)       IMPRESSION:  1. Left foot pain    2. Calcaneal spur of foot, left    3. Status post left foot surgery        PLAN:  1. Current Discharge Medication List      START taking these medications    Details   traMADol (ULTRAM) 50 mg tablet Take 1 Tab by mouth every eight (8) hours as needed for Pain for up to 15 doses. Max Daily Amount: 150 mg.  Qty: 15 Tab, Refills: 0           2. Follow-up Information     Follow up With Details Comments Contact Info    [facility]  Jethro King [address]   [address] [facility]  [phone]      hospitals EMERGENCY DEPT  If symptoms worsen [address] [place] [address]  [phone]        Return to ED if worse     DISCHARGE NOTE  12:12 PM  The patient has been re-evaluated and is ready for discharge. Reviewed available results with patient. Counseled patient on diagnosis and care plan. Patient has expressed understanding, and all questions have been answered. Patient agrees with plan and agrees to follow up as recommended, or return to the ED if their symptoms worsen. Discharge instructions have been provided and explained to the patient, along with reasons to return to the ED. ATTESTATION:  This note is prepared by Sharmaine Head, acting as Scribe for Luz Bustillos. PAUL Sutton: The scribe's documentation has been prepared under my direction and personally reviewed by me in its entirety. I confirm that the note above accurately reflects all work, treatment, procedures, and medical decision making performed by me.

## 2017-07-11 NOTE — Clinical Note
Follow up with Orthopedist/foot specialist for recheck. Return to the Emergency Dept for any concerns. Thank you for allowing us to provide you with medical care today. We realize that you have many choices for your emergency care needs. We thank nilo myrick for choosing [facility]. Please choose us in the future for any continued health care needs. We hope we addressed all of your medical concerns. We strive to provide excellent quality care in the Emergency Department. Anythi ng less than excellent care does not meet our expectations. If a prescription has been provided, please have it filled as soon as possible to avoid a delay in treatment. Read the entire medication instruction sheet provided to you by the pharmacy. If you have any questions or reservations about taking the medication due to side effects or interactions with other medications please call the ER or your primary care physician. The exam and treatment you received in the Emergency Department were f or an emergent problem and is not intended as complete care. It is important that you follow up with a doctor, nurse practitioner, or [address] [place] [address] assistant for ongoing care. If your symptoms worsen or you do not improve as expected and you are unable to  reach your usual health care provider, you should return to the Emergency Department. We are available 24 hours a day. You may be contacted by a [address] for your opinion of this visit. We would appreciate it if you answer \"very satisfied\" to  the survey questions. If you are unable to answer that you are \"very satisfied\" with your visit please contact our nurse manager at [phone] to discuss your concerns. We strive to do the best we can and your opinions are important to us and anything le ss that \"very satisfied\" is not acceptable to Bettie Winter or TRISTAN.

## 2017-07-11 NOTE — ED NOTES
Discharge instructions reviewed with patient, copy given by Hernan Sullivan . Pt is accomponied by family, denies use of wheelchair.

## 2017-11-29 PROBLEM — R06.83 SNORING: Status: ACTIVE | Noted: 2017-11-29

## 2018-01-24 ENCOUNTER — APPOINTMENT (OUTPATIENT)
Dept: GENERAL RADIOLOGY | Age: 58
End: 2018-01-24
Attending: STUDENT IN AN ORGANIZED HEALTH CARE EDUCATION/TRAINING PROGRAM
Payer: COMMERCIAL

## 2018-01-24 ENCOUNTER — HOSPITAL ENCOUNTER (EMERGENCY)
Age: 58
Discharge: HOME | End: 2018-01-24
Attending: STUDENT IN AN ORGANIZED HEALTH CARE EDUCATION/TRAINING PROGRAM
Payer: COMMERCIAL

## 2018-01-24 VITALS
RESPIRATION RATE: 16 BRPM | SYSTOLIC BLOOD PRESSURE: 115 MMHG | TEMPERATURE: 97.4 F | DIASTOLIC BLOOD PRESSURE: 59 MMHG | OXYGEN SATURATION: 96 % | BODY MASS INDEX: 37.77 KG/M2 | WEIGHT: 235 LBS | HEART RATE: 60 BPM | HEIGHT: 66 IN

## 2018-01-24 DIAGNOSIS — R07.9 CHEST PAIN, UNSPECIFIED TYPE: Primary | ICD-10-CM

## 2018-01-24 LAB
ALBUMIN SERPL-MCNC: 3.5 G/DL (ref 3.5–5)
ALBUMIN/GLOB SERPL: 0.9 {RATIO} (ref 1.1–2.2)
ALP SERPL-CCNC: 144 U/L (ref 45–117)
ALT SERPL-CCNC: 27 U/L (ref 12–78)
ANION GAP SERPL CALC-SCNC: 5 MMOL/L (ref 5–15)
AST SERPL-CCNC: 16 U/L (ref 15–37)
BASOPHILS # BLD: 0 K/UL (ref 0–0.1)
BASOPHILS NFR BLD: 1 % (ref 0–1)
BILIRUB SERPL-MCNC: 0.3 MG/DL (ref 0.2–1)
BUN SERPL-MCNC: 14 MG/DL (ref 6–20)
BUN/CREAT SERPL: 17 (ref 12–20)
CALCIUM SERPL-MCNC: 9 MG/DL (ref 8.5–10.1)
CHLORIDE SERPL-SCNC: 107 MMOL/L (ref 97–108)
CO2 SERPL-SCNC: 29 MMOL/L (ref 21–32)
CREAT SERPL-MCNC: 0.82 MG/DL (ref 0.55–1.02)
EOSINOPHIL # BLD: 0.4 K/UL (ref 0–0.4)
EOSINOPHIL NFR BLD: 6 % (ref 0–7)
ERYTHROCYTE [DISTWIDTH] IN BLOOD BY AUTOMATED COUNT: 12.1 % (ref 11.5–14.5)
GLOBULIN SER CALC-MCNC: 4 G/DL (ref 2–4)
GLUCOSE SERPL-MCNC: 142 MG/DL (ref 65–100)
HCT VFR BLD AUTO: 37.2 % (ref 35–47)
HGB BLD-MCNC: 11.5 G/DL (ref 11.5–16)
LYMPHOCYTES # BLD: 1.8 K/UL (ref 0.8–3.5)
LYMPHOCYTES NFR BLD: 26 % (ref 12–49)
MCH RBC QN AUTO: 26.6 PG (ref 26–34)
MCHC RBC AUTO-ENTMCNC: 30.9 G/DL (ref 30–36.5)
MCV RBC AUTO: 86.1 FL (ref 80–99)
MONOCYTES # BLD: 0.3 K/UL (ref 0–1)
MONOCYTES NFR BLD: 5 % (ref 5–13)
NEUTS SEG # BLD: 4.5 K/UL (ref 1.8–8)
NEUTS SEG NFR BLD: 62 % (ref 32–75)
PLATELET # BLD AUTO: 270 K/UL (ref 150–400)
POTASSIUM SERPL-SCNC: 3.5 MMOL/L (ref 3.5–5.1)
PROT SERPL-MCNC: 7.5 G/DL (ref 6.4–8.2)
RBC # BLD AUTO: 4.32 M/UL (ref 3.8–5.2)
SODIUM SERPL-SCNC: 141 MMOL/L (ref 136–145)
TROPONIN I SERPL-MCNC: <0.04 NG/ML
TROPONIN I SERPL-MCNC: <0.04 NG/ML
WBC # BLD AUTO: 7.1 K/UL (ref 3.6–11)

## 2018-01-24 PROCEDURE — 36415 COLL VENOUS BLD VENIPUNCTURE: CPT | Performed by: STUDENT IN AN ORGANIZED HEALTH CARE EDUCATION/TRAINING PROGRAM

## 2018-01-24 PROCEDURE — 71045 X-RAY EXAM CHEST 1 VIEW: CPT

## 2018-01-24 PROCEDURE — 96374 THER/PROPH/DIAG INJ IV PUSH: CPT

## 2018-01-24 PROCEDURE — 85025 COMPLETE CBC W/AUTO DIFF WBC: CPT | Performed by: STUDENT IN AN ORGANIZED HEALTH CARE EDUCATION/TRAINING PROGRAM

## 2018-01-24 PROCEDURE — 74011250636 HC RX REV CODE- 250/636: Performed by: STUDENT IN AN ORGANIZED HEALTH CARE EDUCATION/TRAINING PROGRAM

## 2018-01-24 PROCEDURE — 84484 ASSAY OF TROPONIN QUANT: CPT | Performed by: STUDENT IN AN ORGANIZED HEALTH CARE EDUCATION/TRAINING PROGRAM

## 2018-01-24 PROCEDURE — 93005 ELECTROCARDIOGRAM TRACING: CPT

## 2018-01-24 PROCEDURE — 80053 COMPREHEN METABOLIC PANEL: CPT | Performed by: STUDENT IN AN ORGANIZED HEALTH CARE EDUCATION/TRAINING PROGRAM

## 2018-01-24 PROCEDURE — 96375 TX/PRO/DX INJ NEW DRUG ADDON: CPT

## 2018-01-24 PROCEDURE — 99285 EMERGENCY DEPT VISIT HI MDM: CPT

## 2018-01-24 RX ORDER — NAPROXEN 500 MG/1
500 TABLET ORAL 2 TIMES DAILY WITH MEALS
Qty: 20 TAB | Refills: 0 | Status: SHIPPED | OUTPATIENT
Start: 2018-01-24 | End: 2018-02-03

## 2018-01-24 RX ORDER — MORPHINE SULFATE 4 MG/ML
4 INJECTION, SOLUTION INTRAMUSCULAR; INTRAVENOUS ONCE
Status: COMPLETED | OUTPATIENT
Start: 2018-01-24 | End: 2018-01-24

## 2018-01-24 RX ORDER — ONDANSETRON 2 MG/ML
4 INJECTION INTRAMUSCULAR; INTRAVENOUS
Status: COMPLETED | OUTPATIENT
Start: 2018-01-24 | End: 2018-01-24

## 2018-01-24 RX ORDER — KETOROLAC TROMETHAMINE 30 MG/ML
15 INJECTION, SOLUTION INTRAMUSCULAR; INTRAVENOUS ONCE
Status: COMPLETED | OUTPATIENT
Start: 2018-01-24 | End: 2018-01-24

## 2018-01-24 RX ADMIN — ONDANSETRON 4 MG: 2 INJECTION INTRAMUSCULAR; INTRAVENOUS at 20:54

## 2018-01-24 RX ADMIN — MORPHINE SULFATE 4 MG: 4 INJECTION, SOLUTION INTRAMUSCULAR; INTRAVENOUS at 20:54

## 2018-01-24 RX ADMIN — KETOROLAC TROMETHAMINE 15 MG: 30 INJECTION, SOLUTION INTRAMUSCULAR at 21:38

## 2018-01-25 LAB
ATRIAL RATE: 76 BPM
CALCULATED P AXIS, ECG09: 26 DEGREES
CALCULATED R AXIS, ECG10: -25 DEGREES
CALCULATED T AXIS, ECG11: -16 DEGREES
DIAGNOSIS, 93000: NORMAL
P-R INTERVAL, ECG05: 182 MS
Q-T INTERVAL, ECG07: 408 MS
QRS DURATION, ECG06: 110 MS
QTC CALCULATION (BEZET), ECG08: 459 MS
VENTRICULAR RATE, ECG03: 76 BPM

## 2018-01-25 NOTE — DISCHARGE INSTRUCTIONS
Chest Pain: Care Instructions  Your Care Instructions    There are many things that can cause chest pain. Some are not serious and will get better on their own in a few days. But some kinds of chest pain need more testing and treatment. Your doctor may have recommended a follow-up visit in the next 8 to 12 hours. If you are not getting better, you may need more tests or treatment. Even though your doctor has released you, you still need to watch for any problems. The doctor carefully checked you, but sometimes problems can develop later. If you have new symptoms or if your symptoms do not get better, get medical care right away. If you have worse or different chest pain or pressure that lasts more than 5 minutes or you passed out (lost consciousness), call 911 or seek other emergency help right away. A medical visit is only one step in your treatment. Even if you feel better, you still need to do what your doctor recommends, such as going to all suggested follow-up appointments and taking medicines exactly as directed. This will help you recover and help prevent future problems. How can you care for yourself at home? · Rest until you feel better. · Take your medicine exactly as prescribed. Call your doctor if you think you are having a problem with your medicine. · Do not drive after taking a prescription pain medicine. When should you call for help? Call 911 if:  ? · You passed out (lost consciousness). ? · You have severe difficulty breathing. ? · You have symptoms of a heart attack. These may include:  ¨ Chest pain or pressure, or a strange feeling in your chest.  ¨ Sweating. ¨ Shortness of breath. ¨ Nausea or vomiting. ¨ Pain, pressure, or a strange feeling in your back, neck, jaw, or upper belly or in one or both shoulders or arms. ¨ Lightheadedness or sudden weakness. ¨ A fast or irregular heartbeat.   After you call 911, the  may tell you to chew 1 adult-strength or 2 to 4 low-dose aspirin. Wait for an ambulance. Do not try to drive yourself. ?Call your doctor today if:  ? · You have any trouble breathing. ? · Your chest pain gets worse. ? · You are dizzy or lightheaded, or you feel like you may faint. ? · You are not getting better as expected. ? · You are having new or different chest pain. Where can you learn more? Go to http://sanchez-natalya.info/. Enter A120 in the search box to learn more about \"Chest Pain: Care Instructions. \"  Current as of: March 20, 2017  Content Version: 11.4  © 1577-6711 [employer]. Care instructions adapted under license by [employer] (which disclaims liability or warranty for this information). If you have questions about a medical condition or this instruction, always ask your healthcare professional. [address] any warranty or liability for your use of this information.

## 2018-01-25 NOTE — ED PROVIDER NOTES
HPI Comments: 66 y.o. female with past medical history significant for borderline DM, anemia and anxiety who presents via EMS from work accompanied by her daughter with chief complaint of chest pain. Pt reports acute onset of left-sided chest pain with radiation into her left arm and SOB after being \"startled at work\". Pt states she sat down \"to get herself together\", but she \"felt like she was going to fall out of her chair\". Pt characterizes chest pain as constant \"squeezing\" with intermittent \"sharp\" pains. Pt states pain is 6/10 in severity. Pt also reports \"pressure\" in her head. Of note, pt states she woke up 3-4 days ago with left shoulder pain and difficulty with ROM of left shoulder. Pt states SOB has resolved since arriving in the ED. Pertinent negatives include cardiac history, HTN, injury or trauma to the shoulder, nausea and vomiting. There are no other acute medical concerns at this time. Social hx: former tobacco smoker; occasional EtOH use; denies illicit drug use;  with 9 children  Significant FMHx: DM, HTN, cardiac disease  PCP: Chester Gutiérrez MD    Note written by Angelo Ferguson, as dictated by Liseth Quiroz MD 8:34 PM         The history is provided by the patient. No  was used.         Past Medical History:   Diagnosis Date    Adverse effect of anesthesia     WITHIN 20-30 MINUTES AFTER WAKING UP FROM CARPAL TUNNEL SURGERY, HAD DIFFICULTY BREATHING ,     Anemia     Anxiety     Glucose intolerance (impaired glucose tolerance)        Past Surgical History:   Procedure Laterality Date    HX CARPAL TUNNEL RELEASE Bilateral     HX  SECTION      HX HYSTERECTOMY      HX LAP CHOLECYSTECTOMY      HX ORTHOPAEDIC Left 2015    SPUR REMOVED TO LEFT FOOT    HX ORTHOPAEDIC Left     SPIN AND SCREW IN LEFT ARM         Family History:   Problem Relation Age of Onset    Stroke Mother     Diabetes Mother     Other Mother      BRAIN ANURYSM     Diabetes Father     Kidney Disease Father     Hypertension Father     Diabetes Sister     Alzheimer Maternal Grandmother     Anesth Problems Neg Hx        Social History     Social History    Marital status:      Spouse name: N/A    Number of children: N/A    Years of education: N/A     Occupational History    supervisor      [employer]     Social History Main Topics    Smoking status: Former Smoker     Packs/day: 0.25     Years: 5.00     Quit date: 1/3/2000    Smokeless tobacco: Never Used    Alcohol use 0.6 oz/week     1 Standard drinks or equivalent per week      Comment: OCCASSIONAL    Drug use: No    Sexual activity: Not on file     Other Topics Concern    Not on file     Social History Narrative         ALLERGIES: Percocet [oxycodone-acetaminophen]    Review of Systems   Constitutional: Negative for chills and fever. HENT: Negative for sore throat. Respiratory: Negative for cough and shortness of breath. Cardiovascular: Positive for chest pain. Gastrointestinal: Negative for abdominal pain and vomiting. Genitourinary: Negative for dysuria. Musculoskeletal: Positive for arthralgias (left shoulder pain). Negative for back pain. Skin: Negative for rash. Neurological: Negative for syncope and headaches. Psychiatric/Behavioral: Negative for confusion. All other systems reviewed and are negative. Vitals:    01/24/18 2011   BP: 133/68   Pulse: 78   Resp: 18   Temp: 97.7 °F (36.5 °C)   SpO2: 97%   Weight: 106.6 kg (235 lb)   Height: 5' 6\" (1.676 m)            Physical Exam   Constitutional: She is oriented to person, place, and time. She appears well-developed. No distress. HENT:   Head: Normocephalic and atraumatic. Eyes: Conjunctivae and EOM are normal. Pupils are equal, round, and reactive to light. Neck: Normal range of motion. Neck supple. Cardiovascular: Normal rate, regular rhythm and normal heart sounds. No murmur heard.   Pulmonary/Chest: Effort normal and breath sounds normal. No respiratory distress. She exhibits tenderness. Diffuse left anterior chest wall tenderness to palpation that reproduces pain   Abdominal: Soft. Bowel sounds are normal. She exhibits no distension. There is no tenderness. There is no rebound. Musculoskeletal: Normal range of motion. She exhibits no edema. Neurological: She is alert and oriented to person, place, and time. No cranial nerve deficit. She exhibits normal muscle tone. Coordination normal.   Skin: Skin is warm and dry. No rash noted. Psychiatric: She has a normal mood and affect. Her behavior is normal.   Nursing note and vitals reviewed. Note written by Angelo Porter, as dictated by Toshia Cohn MD 8:34 PM     [facility]  ED Course       Procedures    ED EKG Interpretation:  Rhythm: normal sinus rhythm. Rate (approx.): 76; Axis: normal; ST/T wave: normal. Likely LVH. Poor R-wave progression, age undetermined. No STEMI. Note written by Angelo Porter, as dictated by Toshia Cohn MD 8:13 PM    The patient is resting comfortably and feels better, is alert and in no distress. The repeat examination is unremarkable and benign. The electrocardiogram shows no signs of acute ischemia and the history, exam, diagnostic testing and current condition do not suggest that this patient is having an acute myocardial infarction, significant arrhythmia, unstable angina, esophageal perforation, pulmonary embolism, aortic dissection, pneumothorax, severe pneumonia, sepsis or other significant pathology that would warrant further testing, continued ED treatment, admission, or cardiology or other specialist consultation at this point. The vital signs have been stable. The patient's condition is stable and appropriate for discharge.  The patient will pursue further outpatient evaluation with the primary care physician, other designated physician or cardiologist. The patient and/or caregivers have expressed a clear and thorough understanding and agree to follow up as instructed.

## 2018-01-25 NOTE — ED TRIAGE NOTES
Pt arrives via EMS from work with complaints of sudden 7/10 chest discomfort around 7:30pm after being startled by an officer when she was in the break room of her work. Pt states that pain began after she sat down to \"get herself together\". Pt described pain on the left side of chest and radiating to left arm. Pain is  \"squeezing and stabbing\" Pt received 324 ASA, .4 nitro sublingual, pt has hx of DM.

## 2018-01-25 NOTE — ED NOTES
Pt also reports having difficulty left arm that began 3-4 days ago. Pt denies having any injury to arm and states that she had \"walking pneumonia\" about 2 weeks ago.

## 2018-02-02 PROBLEM — G47.33 OBSTRUCTIVE SLEEP APNEA SYNDROME: Status: ACTIVE | Noted: 2018-02-02

## 2018-02-09 ENCOUNTER — HOSPITAL ENCOUNTER (OUTPATIENT)
Dept: MAMMOGRAPHY | Age: 58
Discharge: HOME | End: 2018-02-09
Payer: COMMERCIAL

## 2018-02-09 DIAGNOSIS — Z78.0 POSTMENOPAUSAL: ICD-10-CM

## 2018-02-09 DIAGNOSIS — Z12.31 VISIT FOR SCREENING MAMMOGRAM: ICD-10-CM

## 2018-02-09 PROCEDURE — 77067 SCR MAMMO BI INCL CAD: CPT

## 2018-02-09 PROCEDURE — 77080 DXA BONE DENSITY AXIAL: CPT

## 2018-05-21 ENCOUNTER — HOSPITAL ENCOUNTER (OUTPATIENT)
Dept: ULTRASOUND IMAGING | Age: 58
Discharge: HOME | End: 2018-05-21

## 2018-05-21 ENCOUNTER — HOSPITAL ENCOUNTER (OUTPATIENT)
Dept: MAMMOGRAPHY | Age: 58
Discharge: HOME | End: 2018-05-21
Payer: COMMERCIAL

## 2018-05-21 DIAGNOSIS — R92.8 ABNORMAL MAMMOGRAM: ICD-10-CM

## 2018-05-21 PROCEDURE — 77065 DX MAMMO INCL CAD UNI: CPT

## 2018-10-10 ENCOUNTER — HOSPITAL ENCOUNTER (OUTPATIENT)
Dept: GENERAL RADIOLOGY | Age: 58
Discharge: HOME | End: 2018-10-10
Attending: INTERNAL MEDICINE
Payer: COMMERCIAL

## 2018-10-10 DIAGNOSIS — M25.551 PAIN OF RIGHT HIP JOINT: ICD-10-CM

## 2018-10-10 DIAGNOSIS — R06.02 SOB (SHORTNESS OF BREATH): ICD-10-CM

## 2018-10-10 PROBLEM — E66.01 SEVERE OBESITY (HCC): Status: ACTIVE | Noted: 2018-10-10

## 2018-10-10 PROCEDURE — 73502 X-RAY EXAM HIP UNI 2-3 VIEWS: CPT

## 2018-10-10 PROCEDURE — 71046 X-RAY EXAM CHEST 2 VIEWS: CPT

## 2018-11-28 ENCOUNTER — HOSPITAL ENCOUNTER (OUTPATIENT)
Age: 58
Setting detail: OBSERVATION
Discharge: HOME | End: 2018-11-29
Attending: EMERGENCY MEDICINE | Admitting: INTERNAL MEDICINE
Payer: COMMERCIAL

## 2018-11-28 ENCOUNTER — APPOINTMENT (OUTPATIENT)
Dept: GENERAL RADIOLOGY | Age: 58
End: 2018-11-28
Attending: EMERGENCY MEDICINE
Payer: COMMERCIAL

## 2018-11-28 DIAGNOSIS — J06.9 UPPER RESPIRATORY TRACT INFECTION, UNSPECIFIED TYPE: ICD-10-CM

## 2018-11-28 DIAGNOSIS — R06.1 STRIDOR: Primary | ICD-10-CM

## 2018-11-28 LAB
ALBUMIN SERPL-MCNC: 3.5 G/DL (ref 3.5–5)
ALBUMIN/GLOB SERPL: 0.8 {RATIO} (ref 1.1–2.2)
ALP SERPL-CCNC: 136 U/L (ref 45–117)
ALT SERPL-CCNC: 64 U/L (ref 12–78)
ANION GAP SERPL CALC-SCNC: 8 MMOL/L (ref 5–15)
AST SERPL-CCNC: 52 U/L (ref 15–37)
ATRIAL RATE: 87 BPM
BASOPHILS # BLD: 0 K/UL (ref 0–0.1)
BASOPHILS NFR BLD: 1 % (ref 0–1)
BILIRUB SERPL-MCNC: 0.5 MG/DL (ref 0.2–1)
BNP SERPL-MCNC: 94 PG/ML (ref 0–125)
BUN SERPL-MCNC: 9 MG/DL (ref 6–20)
BUN/CREAT SERPL: 12 (ref 12–20)
CALCIUM SERPL-MCNC: 8.8 MG/DL (ref 8.5–10.1)
CALCULATED P AXIS, ECG09: 62 DEGREES
CALCULATED R AXIS, ECG10: -33 DEGREES
CALCULATED T AXIS, ECG11: 6 DEGREES
CHLORIDE SERPL-SCNC: 106 MMOL/L (ref 97–108)
CO2 SERPL-SCNC: 23 MMOL/L (ref 21–32)
COMMENT, HOLDF: NORMAL
CREAT SERPL-MCNC: 0.73 MG/DL (ref 0.55–1.02)
DIAGNOSIS, 93000: NORMAL
DIFFERENTIAL METHOD BLD: ABNORMAL
EOSINOPHIL # BLD: 0.5 K/UL (ref 0–0.4)
EOSINOPHIL NFR BLD: 10 % (ref 0–7)
ERYTHROCYTE [DISTWIDTH] IN BLOOD BY AUTOMATED COUNT: 11.9 % (ref 11.5–14.5)
GLOBULIN SER CALC-MCNC: 4.2 G/DL (ref 2–4)
GLUCOSE SERPL-MCNC: 194 MG/DL (ref 65–100)
HCT VFR BLD AUTO: 39.7 % (ref 35–47)
HGB BLD-MCNC: 12.2 G/DL (ref 11.5–16)
IMM GRANULOCYTES # BLD: 0 K/UL (ref 0–0.04)
IMM GRANULOCYTES NFR BLD AUTO: 0 % (ref 0–0.5)
LYMPHOCYTES # BLD: 0.9 K/UL (ref 0.8–3.5)
LYMPHOCYTES NFR BLD: 17 % (ref 12–49)
MCH RBC QN AUTO: 27.5 PG (ref 26–34)
MCHC RBC AUTO-ENTMCNC: 30.7 G/DL (ref 30–36.5)
MCV RBC AUTO: 89.6 FL (ref 80–99)
MONOCYTES # BLD: 0.4 K/UL (ref 0–1)
MONOCYTES NFR BLD: 8 % (ref 5–13)
NEUTS SEG # BLD: 3.2 K/UL (ref 1.8–8)
NEUTS SEG NFR BLD: 64 % (ref 32–75)
NRBC # BLD: 0 K/UL (ref 0–0.01)
NRBC BLD-RTO: 0 PER 100 WBC
P-R INTERVAL, ECG05: 188 MS
PLATELET # BLD AUTO: 214 K/UL (ref 150–400)
PMV BLD AUTO: 10.8 FL (ref 8.9–12.9)
POTASSIUM SERPL-SCNC: 4.2 MMOL/L (ref 3.5–5.1)
PROT SERPL-MCNC: 7.7 G/DL (ref 6.4–8.2)
Q-T INTERVAL, ECG07: 392 MS
QRS DURATION, ECG06: 102 MS
QTC CALCULATION (BEZET), ECG08: 471 MS
RBC # BLD AUTO: 4.43 M/UL (ref 3.8–5.2)
SAMPLES BEING HELD,HOLD: NORMAL
SODIUM SERPL-SCNC: 137 MMOL/L (ref 136–145)
VENTRICULAR RATE, ECG03: 87 BPM
WBC # BLD AUTO: 5.1 K/UL (ref 3.6–11)

## 2018-11-28 PROCEDURE — 74011250636 HC RX REV CODE- 250/636

## 2018-11-28 PROCEDURE — 85025 COMPLETE CBC W/AUTO DIFF WBC: CPT

## 2018-11-28 PROCEDURE — 99218 HC RM OBSERVATION: CPT

## 2018-11-28 PROCEDURE — 74011000250 HC RX REV CODE- 250: Performed by: NURSE PRACTITIONER

## 2018-11-28 PROCEDURE — 77030029684 HC NEB SM VOL KT MONA -A

## 2018-11-28 PROCEDURE — 71045 X-RAY EXAM CHEST 1 VIEW: CPT

## 2018-11-28 PROCEDURE — 96372 THER/PROPH/DIAG INJ SC/IM: CPT

## 2018-11-28 PROCEDURE — 36415 COLL VENOUS BLD VENIPUNCTURE: CPT

## 2018-11-28 PROCEDURE — 96374 THER/PROPH/DIAG INJ IV PUSH: CPT

## 2018-11-28 PROCEDURE — 74011250636 HC RX REV CODE- 250/636: Performed by: EMERGENCY MEDICINE

## 2018-11-28 PROCEDURE — 94664 DEMO&/EVAL PT USE INHALER: CPT

## 2018-11-28 PROCEDURE — 93005 ELECTROCARDIOGRAM TRACING: CPT

## 2018-11-28 PROCEDURE — 94640 AIRWAY INHALATION TREATMENT: CPT

## 2018-11-28 PROCEDURE — 74011250637 HC RX REV CODE- 250/637: Performed by: INTERNAL MEDICINE

## 2018-11-28 PROCEDURE — 77030037378 HC BRONCHOSCOPE DISP TRAN -D

## 2018-11-28 PROCEDURE — 99285 EMERGENCY DEPT VISIT HI MDM: CPT

## 2018-11-28 PROCEDURE — 77010033678 HC OXYGEN DAILY

## 2018-11-28 PROCEDURE — 83880 ASSAY OF NATRIURETIC PEPTIDE: CPT

## 2018-11-28 PROCEDURE — 74011250636 HC RX REV CODE- 250/636: Performed by: NURSE PRACTITIONER

## 2018-11-28 PROCEDURE — 80053 COMPREHEN METABOLIC PANEL: CPT

## 2018-11-28 PROCEDURE — 74011000250 HC RX REV CODE- 250: Performed by: INTERNAL MEDICINE

## 2018-11-28 PROCEDURE — 96375 TX/PRO/DX INJ NEW DRUG ADDON: CPT

## 2018-11-28 RX ORDER — FAMOTIDINE 10 MG/ML
20 INJECTION INTRAVENOUS
Status: COMPLETED | OUTPATIENT
Start: 2018-11-28 | End: 2018-11-28

## 2018-11-28 RX ORDER — EPINEPHRINE 1 MG/ML
0.3 INJECTION, SOLUTION, CONCENTRATE INTRAVENOUS
Status: COMPLETED | OUTPATIENT
Start: 2018-11-28 | End: 2018-11-28

## 2018-11-28 RX ORDER — LATANOPROST 50 UG/ML
1 SOLUTION/ DROPS OPHTHALMIC
COMMUNITY

## 2018-11-28 RX ORDER — DICLOFENAC SODIUM 50 MG/1
50 TABLET, DELAYED RELEASE ORAL 2 TIMES DAILY
COMMUNITY
End: 2020-12-15 | Stop reason: SDUPTHER

## 2018-11-28 RX ORDER — IPRATROPIUM BROMIDE AND ALBUTEROL SULFATE 2.5; .5 MG/3ML; MG/3ML
3 SOLUTION RESPIRATORY (INHALATION)
Status: DISCONTINUED | OUTPATIENT
Start: 2018-11-28 | End: 2018-11-29 | Stop reason: HOSPADM

## 2018-11-28 RX ORDER — DEXAMETHASONE SODIUM PHOSPHATE 4 MG/ML
10 INJECTION, SOLUTION INTRA-ARTICULAR; INTRALESIONAL; INTRAMUSCULAR; INTRAVENOUS; SOFT TISSUE ONCE
Status: COMPLETED | OUTPATIENT
Start: 2018-11-28 | End: 2018-11-28

## 2018-11-28 RX ORDER — EPINEPHRINE 1 MG/ML
INJECTION, SOLUTION, CONCENTRATE INTRAVENOUS
Status: COMPLETED
Start: 2018-11-28 | End: 2018-11-28

## 2018-11-28 RX ORDER — LATANOPROST 50 UG/ML
1 SOLUTION/ DROPS OPHTHALMIC
Status: DISCONTINUED | OUTPATIENT
Start: 2018-11-28 | End: 2018-11-29 | Stop reason: HOSPADM

## 2018-11-28 RX ORDER — DIPHENHYDRAMINE HYDROCHLORIDE 50 MG/ML
25 INJECTION, SOLUTION INTRAMUSCULAR; INTRAVENOUS
Status: COMPLETED | OUTPATIENT
Start: 2018-11-28 | End: 2018-11-28

## 2018-11-28 RX ORDER — SODIUM CHLORIDE 0.9 % (FLUSH) 0.9 %
5-10 SYRINGE (ML) INJECTION EVERY 8 HOURS
Status: DISCONTINUED | OUTPATIENT
Start: 2018-11-28 | End: 2018-11-29 | Stop reason: HOSPADM

## 2018-11-28 RX ORDER — LORATADINE 10 MG/1
10 TABLET ORAL ONCE
Status: COMPLETED | OUTPATIENT
Start: 2018-11-28 | End: 2018-11-28

## 2018-11-28 RX ORDER — SODIUM CHLORIDE 0.9 % (FLUSH) 0.9 %
5-10 SYRINGE (ML) INJECTION AS NEEDED
Status: DISCONTINUED | OUTPATIENT
Start: 2018-11-28 | End: 2018-11-29 | Stop reason: HOSPADM

## 2018-11-28 RX ORDER — GUAIFENESIN 600 MG/1
600 TABLET, EXTENDED RELEASE ORAL 2 TIMES DAILY
COMMUNITY
End: 2020-10-06

## 2018-11-28 RX ORDER — NALOXONE HYDROCHLORIDE 0.4 MG/ML
0.4 INJECTION, SOLUTION INTRAMUSCULAR; INTRAVENOUS; SUBCUTANEOUS AS NEEDED
Status: DISCONTINUED | OUTPATIENT
Start: 2018-11-28 | End: 2018-11-29

## 2018-11-28 RX ORDER — AMOXICILLIN 250 MG
1 CAPSULE ORAL
COMMUNITY
End: 2019-12-19

## 2018-11-28 RX ADMIN — EPINEPHRINE 0.3 MG: 1 INJECTION, SOLUTION, CONCENTRATE INTRAVENOUS at 11:01

## 2018-11-28 RX ADMIN — IPRATROPIUM BROMIDE AND ALBUTEROL SULFATE 3 ML: .5; 3 SOLUTION RESPIRATORY (INHALATION) at 17:48

## 2018-11-28 RX ADMIN — ALBUTEROL SULFATE 1 DOSE: 2.5 SOLUTION RESPIRATORY (INHALATION) at 10:48

## 2018-11-28 RX ADMIN — IPRATROPIUM BROMIDE AND ALBUTEROL SULFATE 3 ML: .5; 3 SOLUTION RESPIRATORY (INHALATION) at 22:34

## 2018-11-28 RX ADMIN — DIPHENHYDRAMINE HYDROCHLORIDE 25 MG: 50 INJECTION, SOLUTION INTRAMUSCULAR; INTRAVENOUS at 10:54

## 2018-11-28 RX ADMIN — DEXAMETHASONE SODIUM PHOSPHATE 10 MG: 4 INJECTION INTRA-ARTICULAR; INTRALESIONAL; INTRAMUSCULAR; INTRAVENOUS; SOFT TISSUE at 10:20

## 2018-11-28 RX ADMIN — FAMOTIDINE 20 MG: 10 INJECTION, SOLUTION INTRAVENOUS at 10:55

## 2018-11-28 RX ADMIN — LATANOPROST 1 DROP: 50 SOLUTION OPHTHALMIC at 21:28

## 2018-11-28 RX ADMIN — Medication 10 ML: at 21:30

## 2018-11-28 RX ADMIN — ALBUTEROL SULFATE: 2.5 SOLUTION RESPIRATORY (INHALATION) at 10:36

## 2018-11-28 RX ADMIN — LORATADINE 10 MG: 10 TABLET ORAL at 21:59

## 2018-11-28 NOTE — H&P
History and Physical 
 
Subjective:  
 
Enid Pena is a 66 y.o.  female who presents with sob  Symptoms started with sore throat and sinus congestion 4-5 days ago. Took sudafed. No fever. She developed a cough productive of clear phlegm and last night became more sob. Went to [facility] - referred to ER. Received nebs, steroids and epi in ER and felt better, but still has some congestion and cough. Mild right sided chest pain worse with coughing. No nausea, vomiting, diarrhea. Had an episode like this a few years ago, diagnosed with asthma and given an inhaler. Has not used it in years. .  
Past Medical History:  
Diagnosis Date  Adverse effect of anesthesia WITHIN 20-30 MINUTES AFTER WAKING UP FROM CARPAL TUNNEL SURGERY, HAD DIFFICULTY BREATHING ,   
 Anemia  Anxiety  Glucose intolerance (impaired glucose tolerance)  Menopause 01/2018 Allergies Allergen Reactions  Percocet [Oxycodone-Acetaminophen] Other (comments) \"It makes me feel bad, real bad,like I have the flu. \" Prior to Admission medications Medication Sig Start Date End Date Taking? Authorizing Provider  
senna-docusate (PERICOLACE) 8.6-50 mg per tablet Take 1 Tab by mouth daily as needed for Constipation. Yes Provider, Historical  
guaiFENesin ER (MUCINEX) 600 mg ER tablet Take 600 mg by mouth two (2) times a day. Yes Provider, Historical  
latanoprost (XALATAN) 0.005 % ophthalmic solution Administer 1 Drop to both eyes nightly. Yes Provider, Historical  
diclofenac EC (VOLTAREN) 50 mg EC tablet Take 50 mg by mouth two (2) times a day. Yes Provider, Historical  
cholecalciferol (VITAMIN D3) 1,000 unit cap Take 1,000 Units by mouth daily. Yes Provider, Historical  
ferrous sulfate 324 mg (65 mg iron) tablet Take 324 mg by mouth two (2) times daily (with meals).    Yes Provider, Historical  
MULTIVIT-MIN/FA/CALCIUM/VIT K1 (ONE-A-DAY WOMEN'S 50 PLUS PO) Take 1 Tab by mouth daily. Yes Provider, Historical  
 
Social History Tobacco Use  Smoking status: Former Smoker Packs/day: 0.25 Years: 15.00 Pack years: 3.75 Last attempt to quit: 1/3/2000 Years since quittin.9  Smokeless tobacco: Never Used Substance Use Topics  Alcohol use: Yes Alcohol/week: 0.6 oz Types: 1 Standard drinks or equivalent per week Comment: OCCASSIONAL Family History Problem Relation Age of Onset  Stroke Mother  Diabetes Mother Marcella.Nito Other Mother BRAIN ANURYSM   
 Diabetes Father  Kidney Disease Father  Hypertension Father  Diabetes Sister  Alzheimer Maternal Grandmother  Anesth Problems Neg Hx Review of Systems: As above. Objective:  
 
 
Physical Exam: morbidly obese  65 yo bf- NAD. eating dinner. HEENT -- Pupils round. O/P Clear. Right tm- occluded with cerumen. Left intact Neck -- Supple. No JVD. no acn. Heart -- RRR. No R/M/G. Lungs -- dec bs in bases, (poor inspiratory effort) no increased work of breathing Abdomen -- Soft. Non-tender. Non-distended. No masses. Bowel sounds present. Extremities -- No edema. good pulses Data Review:  
Recent Results (from the past 24 hour(s)) EKG, 12 LEAD, INITIAL Collection Time: 18  9:59 AM  
Result Value Ref Range Ventricular Rate 87 BPM  
 Atrial Rate 87 BPM  
 P-R Interval 188 ms QRS Duration 102 ms Q-T Interval 392 ms QTC Calculation (Bezet) 471 ms Calculated P Axis 62 degrees Calculated R Axis -33 degrees Calculated T Axis 6 degrees Diagnosis Normal sinus rhythm Left axis deviation Septal infarct (cited on or before 2017) When compared with ECG of 2018 20:13, No significant change was found CBC WITH AUTOMATED DIFF Collection Time: 18 10:52 AM  
Result Value Ref Range WBC 5.1 3.6 - 11.0 K/uL  
 RBC 4.43 3.80 - 5.20 M/uL  
 HGB 12.2 11.5 - 16.0 g/dL HCT 39.7 35.0 - 47.0 % MCV 89.6 80.0 - 99.0 FL  
 MCH 27.5 26.0 - 34.0 PG  
 MCHC 30.7 30.0 - 36.5 g/dL  
 RDW 11.9 11.5 - 14.5 % PLATELET 172 892 - 091 K/uL MPV 10.8 8.9 - 12.9 FL  
 NRBC 0.0 0  WBC ABSOLUTE NRBC 0.00 0.00 - 0.01 K/uL NEUTROPHILS 64 32 - 75 % LYMPHOCYTES 17 12 - 49 % MONOCYTES 8 5 - 13 % EOSINOPHILS 10 (H) 0 - 7 % BASOPHILS 1 0 - 1 % IMMATURE GRANULOCYTES 0 0.0 - 0.5 % ABS. NEUTROPHILS 3.2 1.8 - 8.0 K/UL  
 ABS. LYMPHOCYTES 0.9 0.8 - 3.5 K/UL  
 ABS. MONOCYTES 0.4 0.0 - 1.0 K/UL  
 ABS. EOSINOPHILS 0.5 (H) 0.0 - 0.4 K/UL  
 ABS. BASOPHILS 0.0 0.0 - 0.1 K/UL  
 ABS. IMM. GRANS. 0.0 0.00 - 0.04 K/UL  
 DF AUTOMATED METABOLIC PANEL, COMPREHENSIVE Collection Time: 11/28/18 10:52 AM  
Result Value Ref Range Sodium 137 136 - 145 mmol/L Potassium 4.2 3.5 - 5.1 mmol/L Chloride 106 97 - 108 mmol/L  
 CO2 23 21 - 32 mmol/L Anion gap 8 5 - 15 mmol/L Glucose 194 (H) 65 - 100 mg/dL BUN 9 6 - 20 MG/DL Creatinine 0.73 0.55 - 1.02 MG/DL  
 BUN/Creatinine ratio 12 12 - 20 GFR est AA >60 >60 ml/min/1.73m2 GFR est non-AA >60 >60 ml/min/1.73m2 Calcium 8.8 8.5 - 10.1 MG/DL Bilirubin, total 0.5 0.2 - 1.0 MG/DL  
 ALT (SGPT) 64 12 - 78 U/L  
 AST (SGOT) 52 (H) 15 - 37 U/L Alk. phosphatase 136 (H) 45 - 117 U/L Protein, total 7.7 6.4 - 8.2 g/dL Albumin 3.5 3.5 - 5.0 g/dL Globulin 4.2 (H) 2.0 - 4.0 g/dL A-G Ratio 0.8 (L) 1.1 - 2.2 SAMPLES BEING HELD Collection Time: 11/28/18 10:52 AM  
Result Value Ref Range SAMPLES BEING HELD 1RED,1BLUE   
 COMMENT Add-on orders for these samples will be processed based on acceptable specimen integrity and analyte stability, which may vary by analyte. NT-PRO BNP Collection Time: 11/28/18 10:52 AM  
Result Value Ref Range NT pro-BNP 94 0 - 125 PG/ML Assessment:  
 
Active Problems: 
  Glucose intolerance (impaired glucose tolerance) () Snoring (11/29/2017) Obstructive sleep apnea syndrome (2/2/2018) Severe obesity ([address] (10/10/2018) SOB (shortness of breath) (10/10/2018) Stridor (11/28/2018) Plan:  
 
Stridor/sob- pulm consult in am- jet nebs, monitor sats. mucinex Glu intol- diabetic diet Signed By: Lauro Pino MD   
 November 28, 2018

## 2018-11-28 NOTE — PROGRESS NOTES
TRANSFER - IN REPORT: 
 
Verbal report received from Zane(name) on Clinton Hanson  being received from ED(unit) for routine progression of care Report consisted of patients Situation, Background, Assessment and  
Recommendations(SBAR). Information from the following report(s) SBAR, ED Summary and Recent Results was reviewed with the receiving nurse. Opportunity for questions and clarification was provided. Assessment completed upon patients arrival to unit and care assumed.

## 2018-11-28 NOTE — PROGRESS NOTES
Admission Medication Reconciliation: 
 
Information obtained from: patient, rx query, chart review Significant PMH/Disease States:  
Past Medical History:  
Diagnosis Date  Adverse effect of anesthesia WITHIN 20-30 MINUTES AFTER WAKING UP FROM CARPAL TUNNEL SURGERY, HAD DIFFICULTY BREATHING ,   
 Anemia  Anxiety  Glucose intolerance (impaired glucose tolerance)  Menopause 01/2018 Chief Complaint for this Admission:  shortness of breath Allergies:  Percocet [oxycodone-acetaminophen] Prior to Admission Medications:  
Prior to Admission Medications Prescriptions Last Dose Informant Patient Reported? Taking? MULTIVIT-MIN/FA/CALCIUM/VIT K1 (ONE-A-DAY WOMEN'S 50 PLUS PO) 11/27/2018 at Unknown time  Yes Yes Sig: Take 1 Tab by mouth daily. cholecalciferol (VITAMIN D3) 1,000 unit cap 11/27/2018 at Unknown time  Yes Yes Sig: Take 1,000 Units by mouth daily. diclofenac EC (VOLTAREN) 50 mg EC tablet 11/27/2018 at Unknown time  Yes Yes Sig: Take 50 mg by mouth two (2) times a day. ferrous sulfate 324 mg (65 mg iron) tablet 11/27/2018 at Unknown time  Yes Yes Sig: Take 324 mg by mouth two (2) times daily (with meals). guaiFENesin ER (MUCINEX) 600 mg ER tablet 11/27/2018 at Unknown time  Yes Yes Sig: Take 600 mg by mouth two (2) times a day. latanoprost (XALATAN) 0.005 % ophthalmic solution 11/27/2018 at Unknown time  Yes Yes Sig: Administer 1 Drop to both eyes nightly. senna-docusate (PERICOLACE) 8.6-50 mg per tablet   Yes Yes Sig: Take 1 Tab by mouth daily as needed for Constipation. Facility-Administered Medications: None Comments/Recommendations: Added mucinex. Latanoprost, and diclofenac. Of note, diclofenac was recently prescribed about 2 weeks ago. Also, patient prescribed methylprednisolone at [facility], but has not started yet as she came straight to the ER.

## 2018-11-28 NOTE — ED TRIAGE NOTES
[facility] for shortness of breath and wheezing. Patient [facility] one [facility] at [facility] and chest XRAY. Patient able to speak in complete sentences. Audible wheezing and nasal congestion noted.

## 2018-11-28 NOTE — ED NOTES
Pt reports nasal congestion and cough x 1 week. Reports SOB and \"noisy Breathing\" since last night.

## 2018-11-28 NOTE — ED PROVIDER NOTES
66 y.o. female with past medical history significant for anxiety who presents from [facility] via private vehicle with chief complaint of SOB. Pt is a poor historian. Per significant other at bedside, the patient has had rhinorrhea, sneezing, and a sore throat for the last week. Pt started last night with a cough and SOB with \"noisy breathing\". The SOB worsened this morning, so the patient presented to [facility], where she got one duo neb and a CXR. Pt was sent here for further evaluation.  states the patient has gone to the OR to have her Juan Minks" opened up because it was \"narrow\" a few years ago. Pt also reports a h/o an EGD. Pt denies any h/o intubation. Pt denies taking any antihypertension medication. Pt denies any h/o angioedema. Pt denies any fever. There are no other acute medical concerns at this time. Social hx: Former smoker (Quit ); Occasional EtOH use PCP: Madie Jason MD  
 
Note written by Angelo Weathers, as dictated by Teresa French MD 10:38 AM 
 
 
The history is provided by the patient and a significant other. Past Medical History:  
Diagnosis Date  Adverse effect of anesthesia WITHIN 20-30 MINUTES AFTER WAKING UP FROM CARPAL TUNNEL SURGERY, HAD DIFFICULTY BREATHING ,   
 Anemia  Anxiety  Glucose intolerance (impaired glucose tolerance)  Menopause 2018 Past Surgical History:  
Procedure Laterality Date  HX CARPAL TUNNEL RELEASE Bilateral   
 HX  SECTION    
 HX HYSTERECTOMY  2018  HX LAP CHOLECYSTECTOMY  HX OOPHORECTOMY Bilateral 2018  HX ORTHOPAEDIC Left 2015 SPUR REMOVED TO LEFT FOOT  
 HX ORTHOPAEDIC Left SPIN AND SCREW IN LEFT ARM Family History:  
Problem Relation Age of Onset  Stroke Mother  Diabetes Mother [facility] Other Mother BRAIN ANURYSM   
 Diabetes Father  Kidney Disease Father  Hypertension Father  Diabetes Sister  Alzheimer Maternal Grandmother  Anesth Problems Neg Hx Social History Socioeconomic History  Marital status:  Spouse name: Not on file  Number of children: Not on file  Years of education: Not on file  Highest education level: Not on file Social Needs  Financial resource strain: Not on file  Food insecurity - worry: Not on file  Food insecurity - inability: Not on file  Transportation needs - medical: Not on file  Transportation needs - non-medical: Not on file Occupational History  Occupation: supervisor Comment: [employer] Tobacco Use  Smoking status: Former Smoker Packs/day: 0.25 Years: 15.00 Pack years: 3.75 Last attempt to quit: 1/3/2000 Years since quittin.9  Smokeless tobacco: Never Used Substance and Sexual Activity  Alcohol use: Yes Alcohol/week: 0.6 oz Types: 1 Standard drinks or equivalent per week Comment: OCCASSIONAL  
 Drug use: No  
 Sexual activity: Not on file Other Topics Concern  Not on file Social History Narrative  Not on file ALLERGIES: Percocet [oxycodone-acetaminophen] Review of Systems Constitutional: Negative for fever. HENT: Positive for congestion, rhinorrhea, sneezing and sore throat. Respiratory: Positive for cough and shortness of breath. All other systems reviewed and are negative. Vitals:  
 18 0786 18 1006 BP:  147/83 Pulse: 96 83 Resp:  18 Temp:  98.2 °F (36.8 °C) SpO2: 99% 98% Weight:  99.8 kg (220 lb) Height:  5' 6\" (1.676 m) Physical Exam  
Constitutional: She is oriented to person, place, and time. She appears well-developed and well-nourished. She appears distressed. HENT:  
Head: Normocephalic and atraumatic. Mouth/Throat: No posterior oropharyngeal edema. Eyes: Conjunctivae are normal.  
Neck: Normal range of motion. Cardiovascular: Regular rhythm and normal heart sounds. tachycardic Pulmonary/Chest: Breath sounds normal. Stridor present. She is in respiratory distress. Abdominal: Soft. Bowel sounds are normal. She exhibits no distension. There is no tenderness. Musculoskeletal: Normal range of motion. Neurological: She is alert and oriented to person, place, and time. Skin: Skin is warm and dry. Nursing note and vitals reviewed. MDM Number of Diagnoses or Management Options Stridor:  
Upper respiratory tract infection, unspecified type:  
Diagnosis management comments: Stridor- no hx angioedema no allergic reaction, pt with recent uri though ? Hx of stridor and surgery in the past per her and  (they are unsure if it was ent or gi who did surgery) treat with epinephrine, steroids, benadryl and pepcid Call gi to scope at bedside or in OR Amount and/or Complexity of Data Reviewed Clinical lab tests: ordered and reviewed Tests in the radiology section of CPT®: ordered and reviewed Obtain history from someone other than the patient: yes () Discuss the patient with other providers: yes (Pcp, ent) Critical Care Total time providing critical care: 30-74 minutes (Total critical care time spent exclusive of procedures:  35) Patient Progress Patient progress: stable Procedures Assessment & Plan:  
 
Orders Placed This Encounter  CBC WITH AUTOMATED DIFF  
 METABOLIC PANEL, COMPREHENSIVE  
 Hold Sample  NT-PRO BNP  
 EKG, 12 LEAD, INITIAL  dexamethasone (DECADRON) 4 mg/mL injection 10 mg  
 albuterol 5mg / ipratropium 0.5mg neb solution CONSULT NOTE: 
10:51 AM Bryant Melton MD spoke with Dr. Leydi Estrada, Consult for ENT. Discussed available diagnostic tests and clinical findings. Dr. Leydi Estrada wants the patient to go to the OR to scope and is coming to see the patient. 11:06 AM 
 states the patient wears a CPAP at night.  They are unsure if the issue in the past was a throat vs GI issue. Patient sounds better currently post meds 11:18 AM 
ENT at bedside. 11:30 AM 
ENT evaluated the patient, states there is nothing above the vocal cords. Suspects a bad head cold. Does not need to go to the OR at this time. 12:46 PM 
Patient sounds much better. No stridor or wheezingHusband states they were going to come to the ED early this morning after she woke up gasping for air, and couldn't walk more than 5 feet. They would like admission. Given pt was already medicated when scoped by ent, this is reasonable. CONSULT NOTE: 
12:54 PM Ephraim Nolasco MD spoke with Dr. Jane Mae for PCP. Discussed available diagnostic tests and clinical findings. Dr. Kevan Irby will see and admit the patient.

## 2018-11-28 NOTE — CONSULTS
Ears/Nose/Throat Consult    Subjective:     Date of Consultation:  2018    Referring Physician: ricardo    History of Present Illness:   Patient is a 66 y. o.female who is being seen for stridor. she  was admitted to the ER for evaluation    Past Medical History:   Diagnosis Date    Adverse effect of anesthesia     WITHIN 20-30 MINUTES AFTER WAKING UP FROM CARPAL TUNNEL SURGERY, HAD DIFFICULTY BREATHING ,     Anemia     Anxiety     Glucose intolerance (impaired glucose tolerance)     Menopause 2018      Family History   Problem Relation Age of Onset    Stroke Mother     Diabetes Mother     Other Mother         BRAIN ANURYSM     Diabetes Father     Kidney Disease Father     Hypertension Father     Diabetes Sister     Alzheimer Maternal Grandmother     Anesth Problems Neg Hx       Social History     Tobacco Use    Smoking status: Former Smoker     Packs/day: 0.25     Years: 15.00     Pack years: 3.75     Last attempt to quit: 1/3/2000     Years since quittin.9    Smokeless tobacco: Never Used   Substance Use Topics    Alcohol use: Yes     Alcohol/week: 0.6 oz     Types: 1 Standard drinks or equivalent per week     Comment: OCCASSIONAL     Past Surgical History:   Procedure Laterality Date    HX CARPAL TUNNEL RELEASE Bilateral     HX  SECTION      HX HYSTERECTOMY  2018    HX LAP CHOLECYSTECTOMY      HX OOPHORECTOMY Bilateral 2018    HX ORTHOPAEDIC Left 2015    SPUR REMOVED TO LEFT FOOT    HX ORTHOPAEDIC Left     SPIN AND SCREW IN LEFT ARM      No current facility-administered medications for this encounter. Current Outpatient Medications   Medication Sig    cholecalciferol (VITAMIN D3) 1,000 unit cap Take  by mouth daily.  bisacodyl (DULCOLAX, BISACODYL,) 5 mg EC tablet Take 5 mg by mouth daily.  ferrous sulfate 324 mg (65 mg iron) tablet Take 324 mg by mouth daily.  PATIENT TAKES 2 TABS DAILY R/T ANEMIA    MULTIVIT-MIN/FA/CALCIUM/VIT K1 (ONE-A-DAY WOMEN'S 50 PLUS PO) Take 1 Tab by mouth daily. Allergies   Allergen Reactions    Percocet [Oxycodone-Acetaminophen] Other (comments)     \"It makes me feel bad, real bad,like I have the flu. \"        Review of Systems:  No vision change, recent uri, no dysphagia, odynophagia, shortness of breath, chest pain, palpatations, abdominal pain, change in bowel habits. Noted hoarseness, variable stridor, reflux history. Objective:     Patient Vitals for the past 8 hrs:   BP Temp Pulse Resp SpO2 Height Weight   18 1115 (!) 152/97  (!) 117 20 97 %     18 1054 140/82  89 13 97 %     18 1049     100 %     18 1018 164/82 99.1 °F (37.3 °C) 87 15 99 %     18 1006 147/83 98.2 °F (36.8 °C) 83 18 98 % 5' 6\" (1.676 m) 99.8 kg (220 lb)   18 0923   96  99 %       Temp (24hrs), Av.7 °F (37.1 °C), Min:98.2 °F (36.8 °C), Max:99.1 °F (37.3 °C)    No intake/output data recorded. Physical Exam:   General  General Appearance- Well nourished adult who is alert and cooperative. Voice- hoarse voice and communication. HEENT  Head: Normally developed without evidence of trauma or lesions. Face:  Skin:  Normal color, texture, and turgor. There are no lesions of concern nor swelling or induration. Lips- normal.  Facial Nerve- Bilateral- function is equal and symmetrical with no deficit. Ear: Auricle- Left- Normal development without sinus pit, cyst or any other lesion. Right- Normal development without sinus pit, cyst or any other lesion  Auditory Canal (otoscopic examination)  Left- clean, without edema, discharge, or lesions. Right  clean, without edema, discharge, or lesions. Mastoid- Left- No erythema, edema, tenderness, or protrusion of the auricle. Right- No erythema, edema, tenderness, or protrusion of the auricle. Nose: External Nose- Normally developed without lesion. Nasal Mucosa- moist and pink without erythema, edema, or lesions.   Nasal septum- Caudally the septum is relatively straight without lesion. Turbinates- Bilateral- The turbinates are without hypertrophy, edema, or lesion. Sinuses-  All sinuses are nontender. Oral Cavity/Oropharynx Oral Mucosa: moist without erythema or lesions. Tongue- no lesions or palpable masses. Floor of mouth- soft without palpable masses or mucosal lesion. Palate and uvula- Palate is without cleft and the uvula is not bifid. Throat: Pharynx- Normal mucosal lining without erythema or mass. Larynx: difficult to examine directly. Neck:-- Trachea- midline with normal laryngeal framework with no crepitus. Salivary Glands- normal to palpation without nodules or tenderness. Thyroid- normal to palpation without mass or irregularity. Lymph Nodes- No palpable adenopathy or masses. Range of Motion- good in all directions, with good anterior-posterior and lateral flexion and rotation. Chest and Lung Exam: Normal respiratory effort  Cardiovascular: Regular rate and rhythm. Normal peripheral pulses without bruits. Neurologic exam: Alert and oriented to person, place, and time. Cranial Nerves II-XII intact bilaterally. Pupils equally round, reactive to light. Extraocular movements are intact bilaterally. No baseline nystagmus. Procedure:  Fiberoptic Laryngoscopy:  After topically decongesting the nose, a flexible fiberoptic endoscope was passed through the nose and into the pharynx. Nasal cavity: congestion, septal deviation, no evidence of discharge, polyps, synechea, mass or any other mucosal lesions. Nasopharynx:  congestion  Base of tongue and vallecula- No evidence of lesion, erythema, or mass notes. Larynx/pharynx: vocal cords bilateral- bilateral vocal cord movement without nodule, polyps , or lesions. Pharynx and pyriform sinus-  Lower pharynx and pyriform sinus without lesion or mass. Postcricoid-  moderate edema noted.   Subglottis-  Limited view of the subglottis does not reveal any lesion or mass.  The patient tolerated the procedure well. Assessment:     True vocal cords mobile, adequate laryngeal, supraglottic airway, evidence reflux. Hospital Problems  Date Reviewed: 10/10/2018    None            Plan:     Flonase, topical decongestant no more than 3 days. Reflux management - dietary, positioning discussed with patient. Patient followed by GI. Recommend eval reactive airway. cxr pending.       Signed By: Aleks Ramachandran MD     November 28, 2018

## 2018-11-29 VITALS
DIASTOLIC BLOOD PRESSURE: 67 MMHG | OXYGEN SATURATION: 96 % | BODY MASS INDEX: 35.36 KG/M2 | HEIGHT: 66 IN | TEMPERATURE: 97.6 F | RESPIRATION RATE: 18 BRPM | HEART RATE: 78 BPM | WEIGHT: 220 LBS | SYSTOLIC BLOOD PRESSURE: 120 MMHG

## 2018-11-29 LAB
ALBUMIN SERPL-MCNC: 3.7 G/DL (ref 3.5–5)
ALBUMIN/GLOB SERPL: 0.9 {RATIO} (ref 1.1–2.2)
ALP SERPL-CCNC: 139 U/L (ref 45–117)
ALT SERPL-CCNC: 64 U/L (ref 12–78)
ANION GAP SERPL CALC-SCNC: 11 MMOL/L (ref 5–15)
AST SERPL-CCNC: 31 U/L (ref 15–37)
BILIRUB SERPL-MCNC: 0.4 MG/DL (ref 0.2–1)
BUN SERPL-MCNC: 15 MG/DL (ref 6–20)
BUN/CREAT SERPL: 16 (ref 12–20)
CALCIUM SERPL-MCNC: 9.2 MG/DL (ref 8.5–10.1)
CHLORIDE SERPL-SCNC: 103 MMOL/L (ref 97–108)
CO2 SERPL-SCNC: 23 MMOL/L (ref 21–32)
CREAT SERPL-MCNC: 0.94 MG/DL (ref 0.55–1.02)
ERYTHROCYTE [DISTWIDTH] IN BLOOD BY AUTOMATED COUNT: 12 % (ref 11.5–14.5)
GLOBULIN SER CALC-MCNC: 4.3 G/DL (ref 2–4)
GLUCOSE SERPL-MCNC: 279 MG/DL (ref 65–100)
HCT VFR BLD AUTO: 37.6 % (ref 35–47)
HGB BLD-MCNC: 11.5 G/DL (ref 11.5–16)
MCH RBC QN AUTO: 27.1 PG (ref 26–34)
MCHC RBC AUTO-ENTMCNC: 30.6 G/DL (ref 30–36.5)
MCV RBC AUTO: 88.7 FL (ref 80–99)
NRBC # BLD: 0 K/UL (ref 0–0.01)
NRBC BLD-RTO: 0 PER 100 WBC
PLATELET # BLD AUTO: 268 K/UL (ref 150–400)
PMV BLD AUTO: 9.7 FL (ref 8.9–12.9)
POTASSIUM SERPL-SCNC: 4.1 MMOL/L (ref 3.5–5.1)
PROT SERPL-MCNC: 8 G/DL (ref 6.4–8.2)
RBC # BLD AUTO: 4.24 M/UL (ref 3.8–5.2)
SODIUM SERPL-SCNC: 137 MMOL/L (ref 136–145)
WBC # BLD AUTO: 7.8 K/UL (ref 3.6–11)

## 2018-11-29 PROCEDURE — 99218 HC RM OBSERVATION: CPT

## 2018-11-29 PROCEDURE — 36415 COLL VENOUS BLD VENIPUNCTURE: CPT

## 2018-11-29 PROCEDURE — 74011636637 HC RX REV CODE- 636/637: Performed by: INTERNAL MEDICINE

## 2018-11-29 PROCEDURE — 74011000250 HC RX REV CODE- 250: Performed by: INTERNAL MEDICINE

## 2018-11-29 PROCEDURE — 74011250637 HC RX REV CODE- 250/637: Performed by: INTERNAL MEDICINE

## 2018-11-29 PROCEDURE — 77030029684 HC NEB SM VOL KT MONA -A

## 2018-11-29 PROCEDURE — 80053 COMPREHEN METABOLIC PANEL: CPT

## 2018-11-29 PROCEDURE — 85027 COMPLETE CBC AUTOMATED: CPT

## 2018-11-29 PROCEDURE — 94640 AIRWAY INHALATION TREATMENT: CPT

## 2018-11-29 RX ORDER — BUDESONIDE 0.5 MG/2ML
500 INHALANT ORAL
Status: DISCONTINUED | OUTPATIENT
Start: 2018-11-29 | End: 2018-11-29 | Stop reason: HOSPADM

## 2018-11-29 RX ORDER — PREDNISONE 10 MG/1
10 TABLET ORAL SEE ADMIN INSTRUCTIONS
Qty: 21 TAB | Refills: 0 | Status: SHIPPED | OUTPATIENT
Start: 2018-11-29 | End: 2020-10-06

## 2018-11-29 RX ORDER — ARFORMOTEROL TARTRATE 15 UG/2ML
15 SOLUTION RESPIRATORY (INHALATION)
Status: DISCONTINUED | OUTPATIENT
Start: 2018-11-29 | End: 2018-11-29 | Stop reason: HOSPADM

## 2018-11-29 RX ORDER — LORATADINE 10 MG/1
10 TABLET ORAL
Status: DISCONTINUED | OUTPATIENT
Start: 2018-11-29 | End: 2018-11-29 | Stop reason: HOSPADM

## 2018-11-29 RX ORDER — GUAIFENESIN 600 MG/1
600 TABLET, EXTENDED RELEASE ORAL EVERY 12 HOURS
Status: DISCONTINUED | OUTPATIENT
Start: 2018-11-29 | End: 2018-11-29 | Stop reason: HOSPADM

## 2018-11-29 RX ORDER — MONTELUKAST SODIUM 10 MG/1
10 TABLET ORAL DAILY
Qty: 30 TAB | Refills: 1 | Status: SHIPPED | OUTPATIENT
Start: 2018-11-29

## 2018-11-29 RX ORDER — PREDNISONE 20 MG/1
20 TABLET ORAL
Status: DISCONTINUED | OUTPATIENT
Start: 2018-11-29 | End: 2018-11-29 | Stop reason: HOSPADM

## 2018-11-29 RX ORDER — BUDESONIDE AND FORMOTEROL FUMARATE DIHYDRATE 80; 4.5 UG/1; UG/1
2 AEROSOL RESPIRATORY (INHALATION) 2 TIMES DAILY
Qty: 1 INHALER | Refills: 1 | Status: SHIPPED | OUTPATIENT
Start: 2018-11-29

## 2018-11-29 RX ORDER — ACETAMINOPHEN 325 MG/1
650 TABLET ORAL
Status: DISCONTINUED | OUTPATIENT
Start: 2018-11-29 | End: 2018-11-29 | Stop reason: HOSPADM

## 2018-11-29 RX ADMIN — BUDESONIDE 500 MCG: 0.5 INHALANT RESPIRATORY (INHALATION) at 11:03

## 2018-11-29 RX ADMIN — ACETAMINOPHEN 650 MG: 325 TABLET, FILM COATED ORAL at 02:29

## 2018-11-29 RX ADMIN — ACETAMINOPHEN 650 MG: 325 TABLET, FILM COATED ORAL at 11:39

## 2018-11-29 RX ADMIN — ARFORMOTEROL TARTRATE 15 MCG: 15 SOLUTION RESPIRATORY (INHALATION) at 11:03

## 2018-11-29 RX ADMIN — PREDNISONE 20 MG: 20 TABLET ORAL at 09:08

## 2018-11-29 RX ADMIN — GUAIFENESIN 600 MG: 600 TABLET, EXTENDED RELEASE ORAL at 09:08

## 2018-11-29 RX ADMIN — BENZOCAINE AND MENTHOL 1 LOZENGE: 15; 3.6 LOZENGE ORAL at 09:08

## 2018-11-29 RX ADMIN — LORATADINE 10 MG: 10 TABLET ORAL at 09:16

## 2018-11-29 NOTE — DISCHARGE INSTRUCTIONS
DISCHARGE INSTRUCTIONS  NAME: Cynthia Boston   :  1960   MRN:  [id number]     Date/Time:  2018 12:35 PM    ADMIT DATE: 2018     DISCHARGE DATE: 2018     ADMITTING DIAGNOSIS:  sob    DISCHARGE DIAGNOSIS:  Asthmatic bronchitis    MEDICATIONS:       · It is important that you take the medication exactly as they are prescribed. · Keep your medication in the bottles provided by the pharmacist and keep a list of the medication names, dosages, and times to be taken in your wallet. · Do not take other medications without consulting your doctor. Pain Management: per above medications    What to do at Home    Recommended diet:  Diabetic Diet    Recommended activity: Activity as tolerated    If you experience any of the following symptoms then please call your primary care physician or return to the emergency room if you cannot get hold of your doctor:  Fever, chills, nausea, vomiting, diarrhea, change in mentation, falling, bleeding, shortness of breath, chest pain    Follow Up:  Dr. Leland Prieto MD  you are to call and set up an appointment to see them in 2 weeks. Information obtained by :  I understand that if any problems occur once I am at home I am to contact my physician. I understand and acknowledge receipt of the instructions indicated above.                                                                                                                                            Physician's or R.N.'s Signature                                                                  Date/Time                                                                                                                                              Patient or Representative Signature                                                          Date/Time

## 2018-11-29 NOTE — PROGRESS NOTES
Medical Progress Note NAME: Tita Obrien  
:  1960 MRM:  [id number] Date/Time: 2018  7:53 AM 
 
Problem List:  
Active Problems: 
  Glucose intolerance (impaired glucose tolerance) () Snoring (2017) Obstructive sleep apnea syndrome (2018) Severe obesity ([address] (10/10/2018) SOB (shortness of breath) (10/10/2018) Stridor (2018) Subjective:  
 
Patient c/o sinus drainage causing cough Past Medical History:  
Diagnosis Date  Adverse effect of anesthesia WITHIN 20-30 MINUTES AFTER WAKING UP FROM CARPAL TUNNEL SURGERY, HAD DIFFICULTY BREATHING ,   
 Anemia  Anxiety  Glucose intolerance (impaired glucose tolerance)  Menopause 2018 ROS:  General: negative for fever, chills, sweats, weakness Respiratory:  positive for cough Cardiology:  negative for chest pain, palpitations, orthopnea, PND, edema, syncope Gastrointestinal: negative for abdominal pain, N/V, dysphagia, change in bowel habits, bleeding Objective:  
 
 
Vitals:  
 
 
  
Last 24hrs VS reviewed since prior progress note. Most recent are: 
 
Visit Vitals /72 (BP 1 Location: Right arm, BP Patient Position: At rest;Supine) Pulse 87 Temp 97.9 °F (36.6 °C) Resp 16 Ht 5' 6\" (1.676 m) Wt 220 lb (99.8 kg) SpO2 95% Breastfeeding? No  
BMI 35.51 kg/m² SpO2 Readings from Last 6 Encounters:  
18 95% 10/10/18 96% 18 96% 17 97% 17 100% 17 98% O2 Flow Rate (L/min): 2 l/min No intake or output data in the 24 hours ending 18 0753 Exam:  
 
General   Obese 67 yo bf 
Respiratory   Coarse breath sounds Cardiology  Regular Rate and Rhythm Lab Data Reviewed: (see below) Medications Reviewed: (see below) 
 
______________________________________________________________________ Medications:  
 
Current Facility-Administered Medications Medication Dose Route Frequency  acetaminophen (TYLENOL) tablet 650 mg  650 mg Oral Q6H PRN  
 loratadine (CLARITIN) tablet 10 mg  10 mg Oral DAILY PRN  predniSONE (DELTASONE) tablet 20 mg  20 mg Oral DAILY WITH BREAKFAST  latanoprost (XALATAN) 0.005 % ophthalmic solution 1 Drop  1 Drop Both Eyes QHS  sodium chloride (NS) flush 5-10 mL  5-10 mL IntraVENous Q8H  
 sodium chloride (NS) flush 5-10 mL  5-10 mL IntraVENous PRN  
 albuterol-ipratropium (DUO-NEB) 2.5 MG-0.5 MG/3 ML  3 mL Nebulization Q4H PRN Lab Review:  
 
Recent Labs  
  11/29/18 
0237 11/28/18 
1052 WBC 7.8 5.1 HGB 11.5 12.2 HCT 37.6 39.7  214 Recent Labs  
  11/29/18 0237 11/28/18 
1052  137  
K 4.1 4.2  106 CO2 23 23 * 194* BUN 15 9 CREA 0.94 0.73 CA 9.2 8.8 ALB 3.7 3.5 TBILI 0.4 0.5 SGOT 31 52* ALT 64 64 No results found for: Zach Borne No results for input(s): PH, PCO2, PO2, HCO3, FIO2 in the last 72 hours. No results for input(s): INR in the last 72 hours. No lab exists for component: INREXT Other pertinent lab: NA Assessment:  
 
Patient Active Problem List  
Diagnosis Code  Glucose intolerance (impaired glucose tolerance) R73.02  
 Anemia D64.9  Snoring R06.83  
 Obstructive sleep apnea syndrome G47.33  Severe obesity (HCC) E66.01  
 SOB (shortness of breath) R06.02  
 Pain of right hip joint M25.551  Stridor R06.1 Plan: 1. Bronchitis vs. Asthma- on nebs, add steroids, pulm consult 2. Glu intolerance- diabetic diet. 3. Discharge later today    
 
        
___________________________________________________ Attending Physician: Lexis Garcia MD

## 2018-11-29 NOTE — CONSULTS
[facility]  Pulmonary, Critical Care, and Sleep Medicine    Initial Patient Consult    Name: Shayy Raza MRN: [id number]   : 1960 Hospital: [facility]   Date: 2018        IMPRESSION:   · Cough/SOB- suspect viral exacerbation of atopic asthma. Spring and fall time allergies by hx. · Obesity  · Anemia  · Anxiety      RECOMMENDATIONS:   · Get bedside zain  · Check serum IgE  · From PCCM standpoint can be d/c'd on pred taper over 6 days and start on ICS/LABA like symbicort + singulair. · Pt will be seen in our office [phone] ( [address] [place] [address] ) in the next week for further mgmt. Subjective: This patient has been seen and evaluated at the request of Dr. Seema Carter for SOB. Patient is a 66 y.o. female   Without h/o lung disease whop presents with 4 days cough/wheezing and SOB. She states that fall and spring are her worst seasons and she has allergies with wheezing during these times. Never formally dx'd with asthma. C/o dry cough no fevers chills. No n/v/d. Past Medical History:   Diagnosis Date    Adverse effect of anesthesia     WITHIN 20-30 MINUTES AFTER WAKING UP FROM CARPAL TUNNEL SURGERY, HAD DIFFICULTY BREATHING ,     Anemia     Anxiety     Glucose intolerance (impaired glucose tolerance)     Menopause 2018      Past Surgical History:   Procedure Laterality Date    HX CARPAL TUNNEL RELEASE Bilateral     HX  SECTION      HX HYSTERECTOMY  2018    HX LAP CHOLECYSTECTOMY      HX OOPHORECTOMY Bilateral 2018    HX ORTHOPAEDIC Left 2015    SPUR REMOVED TO LEFT FOOT    HX ORTHOPAEDIC Left     SPIN AND SCREW IN LEFT ARM      Prior to Admission medications    Medication Sig Start Date End Date Taking? Authorizing Provider   senna-docusate (PERICOLACE) 8.6-50 mg per tablet Take 1 Tab by mouth daily as needed for Constipation.    Yes Provider, Historical   guaiFENesin ER (MUCINEX) 600 mg ER tablet Take 600 mg by mouth two (2) times a day.   Yes Provider, Historical   latanoprost (XALATAN) 0.005 % ophthalmic solution Administer 1 Drop to both eyes nightly. Yes Provider, Historical   diclofenac EC (VOLTAREN) 50 mg EC tablet Take 50 mg by mouth two (2) times a day. Yes Provider, Historical   cholecalciferol (VITAMIN D3) 1,000 unit cap Take 1,000 Units by mouth daily. Yes Provider, Historical   ferrous sulfate 324 mg (65 mg iron) tablet Take 324 mg by mouth two (2) times daily (with meals). Yes Provider, Historical   MULTIVIT-MIN/FA/CALCIUM/VIT K1 (ONE-A-DAY WOMEN'S 50 PLUS PO) Take 1 Tab by mouth daily. Yes Provider, Historical     Allergies   Allergen Reactions    Percocet [Oxycodone-Acetaminophen] Other (comments)     \"It makes me feel bad, real bad,like I have the flu. \"      Social History     Tobacco Use    Smoking status: Former Smoker     Packs/day: 0.25     Years: 15.00     Pack years: 3.75     Last attempt to quit: 1/3/2000     Years since quittin.9    Smokeless tobacco: Never Used   Substance Use Topics    Alcohol use: Yes     Alcohol/week: 0.6 oz     Types: 1 Standard drinks or equivalent per week     Comment: OCCASSIONAL      Family History   Problem Relation Age of Onset    Stroke Mother     Diabetes Mother     Other Mother         BRAIN ANURYSM     Diabetes Father     Kidney Disease Father     Hypertension Father     Diabetes Sister     Alzheimer Maternal Grandmother     Anesth Problems Neg Hx         Current Facility-Administered Medications   Medication Dose Route Frequency    predniSONE (DELTASONE) tablet 20 mg  20 mg Oral DAILY WITH BREAKFAST    guaiFENesin ER (MUCINEX) tablet 600 mg  600 mg Oral Q12H    latanoprost (XALATAN) 0.005 % ophthalmic solution 1 Drop  1 Drop Both Eyes QHS    sodium chloride (NS) flush 5-10 mL  5-10 mL IntraVENous Q8H       Review of Systems:  A comprehensive review of systems was negative except for that written in the HPI.     Objective:   Vital Signs:    Visit Vitals  BP 120/67 (BP 1 Location: Right arm, BP Patient Position: At rest)   Pulse 78   Temp 97.6 °F (36.4 °C)   Resp 18   Ht 5' 6\" (1.676 m)   Wt 99.8 kg (220 lb)   SpO2 96%   Breastfeeding? No   BMI 35.51 kg/m²       O2 Device: CPAP mask   O2 Flow Rate (L/min): 2 l/min   Temp (24hrs), Av.3 °F (36.8 °C), Min:97.6 °F (36.4 °C), Max:99.1 °F (37.3 °C)       Intake/Output:   Last shift:      No intake/output data recorded. Last 3 shifts: No intake/output data recorded. No intake or output data in the 24 hours ending 18 0934   Physical Exam:   General:  Alert, cooperative, no distress, appears stated age. Head:  Normocephalic, without obvious abnormality, atraumatic. Eyes:  Conjunctivae/corneas clear. Nose: Nares normal. Septum midline. Mucosa normal.    Throat: Lips, mucosa, and tongue normal.    Neck: Supple, symmetrical, trachea midline,       Lungs:   Poor air movement b/l with some mild end exp wheeze       Heart:  Regular rate and rhythm, S1, S2 normal, no murmur, click, rub or gallop. Abdomen:   Soft, non-tender. Bowel sounds normal. No masses,  No organomegaly. Extremities: Extremities normal, atraumatic, no cyanosis or edema. Pulses: 2+ and symmetric all extremities.    Skin: Skin color, texture, turgor normal. No rashes or lesions   Lymph nodes: Cervical, supraclavicular, and axillary nodes normal.   Neurologic: Grossly nonfocal     Data review:     Recent Results (from the past 24 hour(s))   EKG, 12 LEAD, INITIAL    Collection Time: 18  9:59 AM   Result Value Ref Range    Ventricular Rate 87 BPM    Atrial Rate 87 BPM    P-R Interval 188 ms    QRS Duration 102 ms    Q-T Interval 392 ms    QTC Calculation (Bezet) 471 ms    Calculated P Axis 62 degrees    Calculated R Axis -33 degrees    Calculated T Axis 6 degrees    Diagnosis       Normal sinus rhythm  Left anterior fascicular block  Septal infarct (cited on or before 2017)  When compared with ECG of 2018 20:13,  No significant change was found  Confirmed by Inessa Ahumada M.D., Simajustyna Mckinney ([id number]) on 11/28/2018 8:51:40 PM     CBC WITH AUTOMATED DIFF    Collection Time: 11/28/18 10:52 AM   Result Value Ref Range    WBC 5.1 3.6 - 11.0 K/uL    RBC 4.43 3.80 - 5.20 M/uL    HGB 12.2 11.5 - 16.0 g/dL    HCT 39.7 35.0 - 47.0 %    MCV 89.6 80.0 - 99.0 FL    MCH 27.5 26.0 - 34.0 PG    MCHC 30.7 30.0 - 36.5 g/dL    RDW 11.9 11.5 - 14.5 %    PLATELET 004 924 - 789 K/uL    MPV 10.8 8.9 - 12.9 FL    NRBC 0.0 0  WBC    ABSOLUTE NRBC 0.00 0.00 - 0.01 K/uL    NEUTROPHILS 64 32 - 75 %    LYMPHOCYTES 17 12 - 49 %    MONOCYTES 8 5 - 13 %    EOSINOPHILS 10 (H) 0 - 7 %    BASOPHILS 1 0 - 1 %    IMMATURE GRANULOCYTES 0 0.0 - 0.5 %    ABS. NEUTROPHILS 3.2 1.8 - 8.0 K/UL    ABS. LYMPHOCYTES 0.9 0.8 - 3.5 K/UL    ABS. MONOCYTES 0.4 0.0 - 1.0 K/UL    ABS. EOSINOPHILS 0.5 (H) 0.0 - 0.4 K/UL    ABS. BASOPHILS 0.0 0.0 - 0.1 K/UL    ABS. IMM. GRANS. 0.0 0.00 - 0.04 K/UL    DF AUTOMATED     METABOLIC PANEL, COMPREHENSIVE    Collection Time: 11/28/18 10:52 AM   Result Value Ref Range    Sodium 137 136 - 145 mmol/L    Potassium 4.2 3.5 - 5.1 mmol/L    Chloride 106 97 - 108 mmol/L    CO2 23 21 - 32 mmol/L    Anion gap 8 5 - 15 mmol/L    Glucose 194 (H) 65 - 100 mg/dL    BUN 9 6 - 20 MG/DL    Creatinine 0.73 0.55 - 1.02 MG/DL    BUN/Creatinine ratio 12 12 - 20      GFR est AA >60 >60 ml/min/1.73m2    GFR est non-AA >60 >60 ml/min/1.73m2    Calcium 8.8 8.5 - 10.1 MG/DL    Bilirubin, total 0.5 0.2 - 1.0 MG/DL    ALT (SGPT) 64 12 - 78 U/L    AST (SGOT) 52 (H) 15 - 37 U/L    Alk.  phosphatase 136 (H) 45 - 117 U/L    Protein, total 7.7 6.4 - 8.2 g/dL    Albumin 3.5 3.5 - 5.0 g/dL    Globulin 4.2 (H) 2.0 - 4.0 g/dL    A-G Ratio 0.8 (L) 1.1 - 2.2     SAMPLES BEING HELD    Collection Time: 11/28/18 10:52 AM   Result Value Ref Range    SAMPLES BEING HELD 1RED,1BLUE     COMMENT        Add-on orders for these samples will be processed based on acceptable specimen integrity and analyte stability, which may vary by analyte. NT-PRO BNP    Collection Time: 11/28/18 10:52 AM   Result Value Ref Range    NT pro-BNP 94 0 - 311 PG/ML   METABOLIC PANEL, COMPREHENSIVE    Collection Time: 11/29/18  2:37 AM   Result Value Ref Range    Sodium 137 136 - 145 mmol/L    Potassium 4.1 3.5 - 5.1 mmol/L    Chloride 103 97 - 108 mmol/L    CO2 23 21 - 32 mmol/L    Anion gap 11 5 - 15 mmol/L    Glucose 279 (H) 65 - 100 mg/dL    BUN 15 6 - 20 MG/DL    Creatinine 0.94 0.55 - 1.02 MG/DL    BUN/Creatinine ratio 16 12 - 20      GFR est AA >60 >60 ml/min/1.73m2    GFR est non-AA >60 >60 ml/min/1.73m2    Calcium 9.2 8.5 - 10.1 MG/DL    Bilirubin, total 0.4 0.2 - 1.0 MG/DL    ALT (SGPT) 64 12 - 78 U/L    AST (SGOT) 31 15 - 37 U/L    Alk.  phosphatase 139 (H) 45 - 117 U/L    Protein, total 8.0 6.4 - 8.2 g/dL    Albumin 3.7 3.5 - 5.0 g/dL    Globulin 4.3 (H) 2.0 - 4.0 g/dL    A-G Ratio 0.9 (L) 1.1 - 2.2     CBC W/O DIFF    Collection Time: 11/29/18  2:37 AM   Result Value Ref Range    WBC 7.8 3.6 - 11.0 K/uL    RBC 4.24 3.80 - 5.20 M/uL    HGB 11.5 11.5 - 16.0 g/dL    HCT 37.6 35.0 - 47.0 %    MCV 88.7 80.0 - 99.0 FL    MCH 27.1 26.0 - 34.0 PG    MCHC 30.6 30.0 - 36.5 g/dL    RDW 12.0 11.5 - 14.5 %    PLATELET 233 566 - 678 K/uL    MPV 9.7 8.9 - 12.9 FL    NRBC 0.0 0  WBC    ABSOLUTE NRBC 0.00 0.00 - 0.01 K/uL       Imaging:  I have personally reviewed the patients radiographs and have reviewed the reports:  PCXR clear        Deniz Keepers, MD

## 2018-11-29 NOTE — PROGRESS NOTES
Bedside and Verbal shift change report given to [address] [place] [address] (oncoming nurse) by Jennifer Humphreys (offgoing nurse). Report included the following information SBAR, Kardex, Intake/Output, MAR, Recent Results and Med Rec Status. Primary Nurse Bassam Nettles RN and Ankur RN performed a dual skin assessment on this patient No impairment noted Eduard score is 20

## 2019-12-19 ENCOUNTER — OFFICE VISIT (OUTPATIENT)
Dept: FAMILY MEDICINE CLINIC | Age: 59
End: 2019-12-19

## 2019-12-19 ENCOUNTER — HOSPITAL ENCOUNTER (OUTPATIENT)
Dept: LAB | Age: 59
Discharge: HOME | End: 2019-12-19

## 2019-12-19 VITALS
TEMPERATURE: 98 F | BODY MASS INDEX: 38.51 KG/M2 | SYSTOLIC BLOOD PRESSURE: 138 MMHG | DIASTOLIC BLOOD PRESSURE: 84 MMHG | HEIGHT: 66 IN | HEART RATE: 74 BPM | OXYGEN SATURATION: 95 % | RESPIRATION RATE: 12 BRPM | WEIGHT: 239.6 LBS

## 2019-12-19 DIAGNOSIS — E11.9 CONTROLLED TYPE 2 DIABETES MELLITUS WITHOUT COMPLICATION, WITHOUT LONG-TERM CURRENT USE OF INSULIN (HCC): ICD-10-CM

## 2019-12-19 DIAGNOSIS — E78.5 HYPERLIPIDEMIA, UNSPECIFIED HYPERLIPIDEMIA TYPE: ICD-10-CM

## 2019-12-19 DIAGNOSIS — E55.9 VITAMIN D DEFICIENCY: ICD-10-CM

## 2019-12-19 DIAGNOSIS — E11.9 CONTROLLED TYPE 2 DIABETES MELLITUS WITHOUT COMPLICATION, WITHOUT LONG-TERM CURRENT USE OF INSULIN (HCC): Primary | ICD-10-CM

## 2019-12-19 DIAGNOSIS — E61.1 IRON DEFICIENCY: ICD-10-CM

## 2019-12-19 DIAGNOSIS — E66.01 SEVERE OBESITY (HCC): ICD-10-CM

## 2019-12-19 DIAGNOSIS — G47.33 OBSTRUCTIVE SLEEP APNEA SYNDROME: ICD-10-CM

## 2019-12-19 LAB
25(OH)D3 SERPL-MCNC: 15.9 NG/ML (ref 30–100)
ALBUMIN SERPL-MCNC: 3.8 G/DL (ref 3.5–5)
ALBUMIN/GLOB SERPL: 1.1 {RATIO} (ref 1.1–2.2)
ALP SERPL-CCNC: 130 U/L (ref 45–117)
ALT SERPL-CCNC: 58 U/L (ref 12–78)
ANION GAP SERPL CALC-SCNC: 3 MMOL/L (ref 5–15)
AST SERPL-CCNC: 27 U/L (ref 15–37)
BASOPHILS # BLD: 0.1 K/UL (ref 0–0.1)
BASOPHILS NFR BLD: 1 % (ref 0–1)
BILIRUB SERPL-MCNC: 0.4 MG/DL (ref 0.2–1)
BUN SERPL-MCNC: 11 MG/DL (ref 6–20)
BUN/CREAT SERPL: 15 (ref 12–20)
CALCIUM SERPL-MCNC: 9.8 MG/DL (ref 8.5–10.1)
CHLORIDE SERPL-SCNC: 108 MMOL/L (ref 97–108)
CHOLEST SERPL-MCNC: 190 MG/DL
CO2 SERPL-SCNC: 30 MMOL/L (ref 21–32)
CREAT SERPL-MCNC: 0.73 MG/DL (ref 0.55–1.02)
DIFFERENTIAL METHOD BLD: ABNORMAL
EOSINOPHIL # BLD: 0.3 K/UL (ref 0–0.4)
EOSINOPHIL NFR BLD: 4 % (ref 0–7)
ERYTHROCYTE [DISTWIDTH] IN BLOOD BY AUTOMATED COUNT: 11.4 % (ref 11.5–14.5)
EST. AVERAGE GLUCOSE BLD GHB EST-MCNC: 148 MG/DL
FERRITIN SERPL-MCNC: 278 NG/ML (ref 8–252)
GLOBULIN SER CALC-MCNC: 3.6 G/DL (ref 2–4)
GLUCOSE SERPL-MCNC: 128 MG/DL (ref 65–100)
HBA1C MFR BLD: 6.8 % (ref 4–5.6)
HCT VFR BLD AUTO: 40.4 % (ref 35–47)
HDLC SERPL-MCNC: 63 MG/DL
HDLC SERPL: 3 {RATIO} (ref 0–5)
HGB BLD-MCNC: 12.2 G/DL (ref 11.5–16)
IMM GRANULOCYTES # BLD AUTO: 0 K/UL (ref 0–0.04)
IMM GRANULOCYTES NFR BLD AUTO: 0 % (ref 0–0.5)
IRON SATN MFR SERPL: 28 % (ref 20–50)
IRON SERPL-MCNC: 79 UG/DL (ref 35–150)
LDLC SERPL CALC-MCNC: 93.6 MG/DL (ref 0–100)
LIPID PROFILE,FLP: ABNORMAL
LYMPHOCYTES # BLD: 1.6 K/UL (ref 0.8–3.5)
LYMPHOCYTES NFR BLD: 23 % (ref 12–49)
MCH RBC QN AUTO: 26.9 PG (ref 26–34)
MCHC RBC AUTO-ENTMCNC: 30.2 G/DL (ref 30–36.5)
MCV RBC AUTO: 89.2 FL (ref 80–99)
MONOCYTES # BLD: 0.3 K/UL (ref 0–1)
MONOCYTES NFR BLD: 4 % (ref 5–13)
NEUTS SEG # BLD: 4.7 K/UL (ref 1.8–8)
NEUTS SEG NFR BLD: 68 % (ref 32–75)
NRBC # BLD: 0 K/UL (ref 0–0.01)
NRBC BLD-RTO: 0 PER 100 WBC
PLATELET # BLD AUTO: 340 K/UL (ref 150–400)
PMV BLD AUTO: 9.7 FL (ref 8.9–12.9)
POTASSIUM SERPL-SCNC: 4.3 MMOL/L (ref 3.5–5.1)
PROT SERPL-MCNC: 7.4 G/DL (ref 6.4–8.2)
RBC # BLD AUTO: 4.53 M/UL (ref 3.8–5.2)
SODIUM SERPL-SCNC: 141 MMOL/L (ref 136–145)
TIBC SERPL-MCNC: 280 UG/DL (ref 250–450)
TRIGL SERPL-MCNC: 167 MG/DL (ref ?–150)
TSH SERPL DL<=0.05 MIU/L-ACNC: 1 UIU/ML (ref 0.36–3.74)
VLDLC SERPL CALC-MCNC: 33.4 MG/DL
WBC # BLD AUTO: 7 K/UL (ref 3.6–11)

## 2019-12-19 RX ORDER — METFORMIN HYDROCHLORIDE 500 MG/1
TABLET, EXTENDED RELEASE ORAL
COMMUNITY
Start: 2019-06-07 | End: 2019-12-19

## 2019-12-19 RX ORDER — METFORMIN HYDROCHLORIDE 500 MG/1
1000 TABLET ORAL 2 TIMES DAILY WITH MEALS
Qty: 240 TAB | Refills: 1 | Status: SHIPPED | OUTPATIENT
Start: 2019-12-19 | End: 2020-05-12 | Stop reason: SDUPTHER

## 2019-12-19 NOTE — PROGRESS NOTES
Varsha Winter is a 64 y.o. female who presents today with the following:    HPI  Chief Complaint   Patient presents with    New Patient     To be Established     Former PCP doesn't take her insurance    Medication Refill     Metformin       1. Controlled type 2 diabetes mellitus without complication, without long-term current use of insulin ([facility]  Diabetes  Patient presents today for diabetes follow up. Pt. current diabetic medications include metformin. Taking medication as prescribed. Current monitoring regimen: none. Home blood sugar records: fasting range: Does not check. Any episodes of hypoglycemia? no. Known diabetic complications: none. Cardiovascular risk factors: dyslipidemia. Not currently on ASA, statin, ACE/ARB. Current DM meds: Metformin thousand milligrams twice a day    Skips doses of meds: None    Recent hospitalizations for DM: None    We will get records from her previous PCP. Lab Results   Component Value Date/Time    Hemoglobin A1c (POC) 5.9 (A) 10/10/2018 04:04 PM           2. Hyperlipidemia, unspecified hyperlipidemia type  Patient is currently not taking cholesterol medication. She reports he was previously described with hyperlipidemia. She requests lab work check lipid panel today. 3. Severe obesity ([facility]  Patient is determined to start lifestyle modification including diet and exercise. 4. Vitamin D deficiency  Patient is currently not taking vitamin D. She was previously diagnosed with vitamin D deficiency and was prescribed supplement. 5. Iron deficiency  Patient was previously placed on iron supplements. She requests to recheck iron levels today. She is currently not taking iron supplements. 6. Obstructive sleep apnea syndrome  Patient previously had a sleep study done which she took the machine at home. This was more than 3 years ago per patient. She was diagnosed with obstructive sleep apnea.   Patient requests a new referral for reevaluation and new sleep study. Review of Systems   Constitutional: Negative. HENT: Negative. Eyes: Negative. Respiratory: Negative. Cardiovascular: Negative. Gastrointestinal: Negative. Genitourinary: Negative. Musculoskeletal: Negative. Skin: Negative. Neurological: Negative. Endo/Heme/Allergies: Negative. Psychiatric/Behavioral: Negative. Physical Exam  Vitals signs and nursing note reviewed. HENT:      Head: Normocephalic and atraumatic. Right Ear: External ear normal.      Left Ear: External ear normal.      Nose: Nose normal.      Mouth/Throat:      Pharynx: No oropharyngeal exudate. Eyes:      Conjunctiva/sclera: Conjunctivae normal.   Neck:      Musculoskeletal: Normal range of motion and neck supple. Thyroid: No thyromegaly. Cardiovascular:      Rate and Rhythm: Normal rate and regular rhythm. Pulmonary:      Effort: Pulmonary effort is normal.      Breath sounds: Normal breath sounds. Abdominal:      General: Bowel sounds are normal. There is no distension. Palpations: Abdomen is soft. Tenderness: There is no tenderness. Musculoskeletal: Normal range of motion. Lymphadenopathy:      Cervical: No cervical adenopathy. Skin:     General: Skin is warm and dry. Neurological:      Mental Status: She is alert and oriented to person, place, and time. Psychiatric:         Mood and Affect: Mood and affect normal.       /84   Pulse 74   Temp 98 °F (36.7 °C) (Oral)   Resp 12   Ht 5' 6\" (1.676 m)   Wt 239 lb 9.6 oz (108.7 kg)   SpO2 95%   BMI 38.67 kg/m²     Allergies   Allergen Reactions    Percocet [Oxycodone-Acetaminophen] Other (comments)     \"It makes me feel bad, real bad,like I have the flu. \"       Current Outpatient Medications   Medication Sig    metFORMIN (GLUCOPHAGE) 500 mg tablet Take 2 Tabs by mouth two (2) times daily (with meals).  predniSONE (STERAPRED DS) 10 mg dose pack Take 1 Tab by mouth See Admin Instructions. See administration instruction per 10mg dose pack    budesonide-formoterol (SYMBICORT) 80-4.5 mcg/actuation HFAA Take 2 Puffs by inhalation two (2) times a day.  montelukast (SINGULAIR) 10 mg tablet Take 1 Tab by mouth daily.  guaiFENesin ER (MUCINEX) 600 mg ER tablet Take 600 mg by mouth two (2) times a day.  latanoprost (XALATAN) 0.005 % ophthalmic solution Administer 1 Drop to both eyes nightly.  diclofenac EC (VOLTAREN) 50 mg EC tablet Take 50 mg by mouth two (2) times a day. No current facility-administered medications for this visit. Past Medical History:   Diagnosis Date    Adverse effect of anesthesia     WITHIN 20-30 MINUTES AFTER WAKING UP FROM CARPAL TUNNEL SURGERY, HAD DIFFICULTY BREATHING ,     Anemia     Anxiety     Glucose intolerance (impaired glucose tolerance)     Menopause 2018       Past Surgical History:   Procedure Laterality Date    HX CARPAL TUNNEL RELEASE Bilateral     HX  SECTION      HX HYSTERECTOMY  2018    HX LAP CHOLECYSTECTOMY      HX OOPHORECTOMY Bilateral 2018    HX ORTHOPAEDIC Left 2015    SPUR REMOVED TO LEFT FOOT    HX ORTHOPAEDIC Left     SPIN AND SCREW IN LEFT ARM       Problem List  Date Reviewed: 2019          Codes Class Noted    Stridor ICD-10-CM: R06.1  ICD-9-CM: 786.1  2018        Severe obesity ([address] ICD-10-CM: E66.01  ICD-9-CM: 278.01  10/10/2018        SOB (shortness of breath) ICD-10-CM: R06.02  ICD-9-CM: 786.05  10/10/2018        Pain of right hip joint ICD-10-CM: M25.551  ICD-9-CM: 719.45  10/10/2018        Obstructive sleep apnea syndrome ICD-10-CM: G47.33  ICD-9-CM: 327.23  2018        Snoring ICD-10-CM: R06.83  ICD-9-CM: 786.09  2017        Glucose intolerance (impaired glucose tolerance) ICD-10-CM: R73.02  ICD-9-CM: 790.22  Unknown        Anemia ICD-10-CM: D64.9  ICD-9-CM: 285. 9  Unknown               No results found for this visit on 19.       1. Controlled type 2 diabetes mellitus without complication, without long-term current use of insulin (HCC)    - metFORMIN (GLUCOPHAGE) 500 mg tablet; Take 2 Tabs by mouth two (2) times daily (with meals). Dispense: 240 Tab; Refill: 1  - TSH 3RD GENERATION; Future  - METABOLIC PANEL, COMPREHENSIVE; Future  - CBC WITH AUTOMATED DIFF; Future  - HEMOGLOBIN A1C WITH EAG; Future    2. Hyperlipidemia, unspecified hyperlipidemia type    - TSH 3RD GENERATION; Future  - LIPID PANEL; Future    3. Severe obesity ([address]      4. Vitamin D deficiency    - VITAMIN D, 25 HYDROXY; Future    5. Iron deficiency    - FERRITIN; Future  - IRON PROFILE; Future    6. Obstructive sleep apnea syndrome    - SLEEP MEDICINE REFERRAL        Follow-up and Dispositions    · Return in about 1 month (around 1/19/2020) for follow up. I have discussed the diagnosis with the patient and the intended plan as seen in the above orders. The patient has received an after-visit summary and questions were answered concerning future plans. I have discussed medication side effects and warnings with the patient as well. The patient agrees and understands above plan.            Helen Clemente MD

## 2019-12-19 NOTE — PROGRESS NOTES
Patient stated name &     Chief Complaint   Patient presents with    New Patient     To be Established     Former PCP doesn't take her insurance    Medication Refill     Metformin        Health Maintenance Due   Topic    Shingrix Vaccine Age 50> (1 of 2)    FOBT Q 1 YEAR AGE 54-65     Influenza Age 5 to Adult     PAP AKA CERVICAL CYTOLOGY        Wt Readings from Last 3 Encounters:   19 239 lb 9.6 oz (108.7 kg)   18 220 lb (99.8 kg)   10/10/18 233 lb (105.7 kg)     Temp Readings from Last 3 Encounters:   19 98 °F (36.7 °C) (Oral)   18 97.6 °F (36.4 °C)   18 97.4 °F (36.3 °C)     BP Readings from Last 3 Encounters:   19 138/84   18 120/67   10/10/18 140/80     Pulse Readings from Last 3 Encounters:   19 74   18 78   18 60         Learning Assessment:  :     No flowsheet data found. Depression Screening:  :     3 most recent PHQ Screens 2019   Little interest or pleasure in doing things Not at all   Feeling down, depressed, irritable, or hopeless Not at all   Total Score PHQ 2 0       Fall Risk Assessment:  :     No flowsheet data found. Abuse Screening:  :     No flowsheet data found. Coordination of Care Questionnaire:  :     1) Have you been to an emergency room, urgent care clinic since your last visit? 2 weeks ago     [place] [facility]    Sinus Infection    Hospitalized since your last visit? No             2) Have you seen or consulted any other health care providers outside of [address] [place] [address] since your last visit? No  (Include any pap smears or colon screenings in this section.)    Patient is accompanied by self I have received verbal consent from [place] to discuss any/all medical information while they are present in the room.

## 2019-12-23 ENCOUNTER — DOCUMENTATION ONLY (OUTPATIENT)
Dept: FAMILY MEDICINE CLINIC | Age: 59
End: 2019-12-23

## 2019-12-27 ENCOUNTER — TELEPHONE (OUTPATIENT)
Dept: FAMILY MEDICINE CLINIC | Age: 59
End: 2019-12-27

## 2019-12-27 NOTE — TELEPHONE ENCOUNTER
----- Message from Chico Kemp sent at 12/27/2019 11:49 AM EST -----  Regarding: Dr. Reg Golden telephone  Contact: [phone]  Pt is requesting a call back with information for allergist that she was referred to.  Best contact [phone]

## 2020-01-14 ENCOUNTER — TELEPHONE (OUTPATIENT)
Dept: FAMILY MEDICINE CLINIC | Age: 60
End: 2020-01-14

## 2020-01-14 NOTE — TELEPHONE ENCOUNTER
I spoke to MsValentín Yale Primrose to let her know that we had the correct insurance information. She said that she had received a denial from [id number] [facility] on the labs done on 12/19/19. I spoke to Osseo at [employer] and they did [employer] as her insurance for some reason. We did not have [place] listed even as a cancelled insurance so not sure how they received it. But I gave her the correct [address] information for resubmission on her lab claim.

## 2020-01-14 NOTE — TELEPHONE ENCOUNTER
Just a quick note:  Patient's primary care on this call should be Dr. Nadia Domínguez not Dr. Juanjo Garcia. Error was made.

## 2020-01-14 NOTE — TELEPHONE ENCOUNTER
----- Message from Farnaz Valladares sent at 1/14/2020 10:37 AM EST -----  Regarding: Dr. Lesvia Manning General Message/Vendor Calls    Caller's first and last name: Jaquan Alfred      Reason for call: Regarding labs from 12/19/2019 were billed under her old ins and not her new ins on file.        Call back required yes/no and why: No      Best contact number(s): [phone]      Details to clarify the request:      Farnaz Valladares

## 2020-01-21 ENCOUNTER — OFFICE VISIT (OUTPATIENT)
Dept: FAMILY MEDICINE CLINIC | Age: 60
End: 2020-01-21

## 2020-01-21 VITALS
BODY MASS INDEX: 38.83 KG/M2 | HEART RATE: 83 BPM | OXYGEN SATURATION: 98 % | DIASTOLIC BLOOD PRESSURE: 87 MMHG | RESPIRATION RATE: 12 BRPM | SYSTOLIC BLOOD PRESSURE: 138 MMHG | HEIGHT: 66 IN | TEMPERATURE: 98.3 F | WEIGHT: 241.6 LBS

## 2020-01-21 DIAGNOSIS — E55.9 VITAMIN D DEFICIENCY: Primary | ICD-10-CM

## 2020-01-21 DIAGNOSIS — E78.5 HYPERLIPIDEMIA, UNSPECIFIED HYPERLIPIDEMIA TYPE: ICD-10-CM

## 2020-01-21 DIAGNOSIS — E11.9 CONTROLLED TYPE 2 DIABETES MELLITUS WITHOUT COMPLICATION, WITHOUT LONG-TERM CURRENT USE OF INSULIN (HCC): ICD-10-CM

## 2020-01-21 RX ORDER — GLUCOSAM/CHONDRO/HERB 149/HYAL 750-100 MG
1 TABLET ORAL DAILY
Qty: 90 CAP | Refills: 1 | Status: SHIPPED | OUTPATIENT
Start: 2020-01-21

## 2020-01-21 NOTE — PROGRESS NOTES
Nicki Kennedy is a 64 y.o. female who presents today with the following:    HPI  Chief Complaint   Patient presents with    Labs     Follow up       1. Vitamin D deficiency  Patient is currently taking vitamin D over-the-counter. She was found to have low vitamin D levels. 2. Controlled type 2 diabetes mellitus without complication, without long-term current use of insulin ([facility] [address]  Diabetes  Patient presents today for diabetes follow up. Pt. current diabetic medications include metformin. Taking medication as prescribed. Current monitoring regimen: home blood tests - rarely. Home blood sugar records: fasting range: Does not check. Any episodes of hypoglycemia? no. Known diabetic complications: none. Cardiovascular risk factors: dyslipidemia, diabetes mellitus. Not currently on ASA, statin, ACE/ARB. Current DM meds: Metformin 500 mg 2 tablets twice a day    Skips doses of meds: None    Recent hospitalizations for DM: None    Diabetic Foot and Eye Exam HM Status   Topic Date Due    Diabetic Foot Care  01/04/1970    Eye Exam  01/04/1970     There is no immunization history for the selected administration types on file for this patient. No results found for: MCACR, MCA1, MCA2, MCA3, MCAU, MCAU2, MCALPOCT  Lab Results   Component Value Date/Time    Hemoglobin A1c 6.8 (H) 12/19/2019 11:15 AM    Hemoglobin A1c (POC) 5.9 (A) 10/10/2018 04:04 PM           3. Hyperlipidemia, unspecified hyperlipidemia type  Triglycerides were elevated. Patient is advised to start fish oil. She is currently not following a low-fat diet. Review of Systems   Constitutional: Negative. HENT: Negative. Eyes: Negative. Respiratory: Negative. Cardiovascular: Negative. Gastrointestinal: Negative. Genitourinary: Negative. Musculoskeletal: Negative. Skin: Negative. Neurological: Negative. Endo/Heme/Allergies: Negative. Psychiatric/Behavioral: Negative.         Physical Exam  Vitals signs and nursing note reviewed. HENT:      Head: Normocephalic and atraumatic. Right Ear: External ear normal.      Left Ear: External ear normal.      Nose: Nose normal.      Mouth/Throat:      Pharynx: No oropharyngeal exudate. Eyes:      Conjunctiva/sclera: Conjunctivae normal.   Neck:      Musculoskeletal: Normal range of motion and neck supple. Thyroid: No thyromegaly. Cardiovascular:      Rate and Rhythm: Normal rate and regular rhythm. Pulmonary:      Effort: Pulmonary effort is normal.      Breath sounds: Normal breath sounds. Abdominal:      General: Bowel sounds are normal. There is no distension. Palpations: Abdomen is soft. Tenderness: There is no tenderness. Musculoskeletal: Normal range of motion. Lymphadenopathy:      Cervical: No cervical adenopathy. Skin:     General: Skin is warm and dry. Neurological:      Mental Status: She is alert and oriented to person, place, and time. Psychiatric:         Mood and Affect: Mood and affect normal.       /87   Pulse 83   Temp 98.3 °F (36.8 °C) (Oral)   Resp 12   Ht 5' 6\" (1.676 m)   Wt 241 lb 9.6 oz (109.6 kg)   SpO2 98%   BMI 39.00 kg/m²     Allergies   Allergen Reactions    Percocet [Oxycodone-Acetaminophen] Other (comments)     \"It makes me feel bad, real bad,like I have the flu. \"       Current Outpatient Medications   Medication Sig    multivitamin (DAILY VITAMINS PO) Take  by mouth.  omega 3-DHA-EPA-fish oil (FISH OIL) 1,000 mg (120 mg-180 mg) capsule Take 1 Cap by mouth daily. Indications: high amount of triglyceride in the blood    metFORMIN (GLUCOPHAGE) 500 mg tablet Take 2 Tabs by mouth two (2) times daily (with meals).  budesonide-formoterol (SYMBICORT) 80-4.5 mcg/actuation HFAA Take 2 Puffs by inhalation two (2) times a day.  montelukast (SINGULAIR) 10 mg tablet Take 1 Tab by mouth daily.  latanoprost (XALATAN) 0.005 % ophthalmic solution Administer 1 Drop to both eyes nightly.     diclofenac EC (VOLTAREN) 50 mg EC tablet Take 50 mg by mouth two (2) times a day.  predniSONE (STERAPRED DS) 10 mg dose pack Take 1 Tab by mouth See Admin Instructions. See administration instruction per 10mg dose pack    guaiFENesin ER (MUCINEX) 600 mg ER tablet Take 600 mg by mouth two (2) times a day. No current facility-administered medications for this visit. Past Medical History:   Diagnosis Date    Adverse effect of anesthesia     WITHIN 20-30 MINUTES AFTER WAKING UP FROM CARPAL TUNNEL SURGERY, HAD DIFFICULTY BREATHING ,     Anemia     Anxiety     Glucose intolerance (impaired glucose tolerance)     Menopause 2018       Past Surgical History:   Procedure Laterality Date    HX CARPAL TUNNEL RELEASE Bilateral     HX  SECTION      HX HYSTERECTOMY  2018    HX LAP CHOLECYSTECTOMY      HX OOPHORECTOMY Bilateral 2018    HX ORTHOPAEDIC Left 2015    SPUR REMOVED TO LEFT FOOT    HX ORTHOPAEDIC Left     SPIN AND SCREW IN LEFT ARM       Problem List  Date Reviewed: 2020          Codes Class Noted    Stridor ICD-10-CM: R06.1  ICD-9-CM: 786.1  2018        Severe obesity ([facility] [address] ICD-10-CM: E66.01  ICD-9-CM: 278.01  10/10/2018        SOB (shortness of breath) ICD-10-CM: R06.02  ICD-9-CM: 786.05  10/10/2018        Pain of right hip joint ICD-10-CM: M25.551  ICD-9-CM: 719.45  10/10/2018        Obstructive sleep apnea syndrome ICD-10-CM: G47.33  ICD-9-CM: 327.23  2018        Snoring ICD-10-CM: R06.83  ICD-9-CM: 786.09  2017        Glucose intolerance (impaired glucose tolerance) ICD-10-CM: R73.02  ICD-9-CM: 790.22  Unknown        Anemia ICD-10-CM: D64.9  ICD-9-CM: 285. 9  Unknown               No results found for this visit on 20. 1. Vitamin D deficiency  Continue over-the-counter vitamin D supplement. 2. Controlled type 2 diabetes mellitus without complication, without long-term current use of insulin ([facility] [address]  Recheck labs in 6 months.   No change in current medication since HbA1c is still within the goal.    3. Hyperlipidemia, unspecified hyperlipidemia type    - omega 3-DHA-EPA-fish oil (FISH OIL) 1,000 mg (120 mg-180 mg) capsule; Take 1 Cap by mouth daily. Indications: high amount of triglyceride in the blood  Dispense: 90 Cap; Refill: 1        Follow-up and Dispositions    · Return in about 6 months (around 7/21/2020) for follow up. I have discussed the diagnosis with the patient and the intended plan as seen in the above orders. The patient has received an after-visit summary and questions were answered concerning future plans. I have discussed medication side effects and warnings with the patient as well. The patient agrees and understands above plan.            Megan Sahu MD

## 2020-01-21 NOTE — PROGRESS NOTES
Patient stated name &     Chief Complaint   Patient presents with    Labs     Follow up       Health Maintenance Due   Topic    Pneumococcal 0-64 years (1 of 1 - PPSV23)    FOOT EXAM Q1     MICROALBUMIN Q1     EYE EXAM RETINAL OR DILATED     Shingrix Vaccine Age 50> (1 of 2)       Wt Readings from Last 3 Encounters:   20 239 lb (108.4 kg)   19 239 lb 9.6 oz (108.7 kg)   18 220 lb (99.8 kg)     Temp Readings from Last 3 Encounters:   19 98 °F (36.7 °C) (Oral)   18 97.6 °F (36.4 °C)   18 97.4 °F (36.3 °C)     BP Readings from Last 3 Encounters:   19 138/84   18 120/67   10/10/18 140/80     Pulse Readings from Last 3 Encounters:   19 74   18 78   18 60         Learning Assessment:  :     No flowsheet data found. Depression Screening:  :     3 most recent PHQ Screens 2020   Little interest or pleasure in doing things Not at all   Feeling down, depressed, irritable, or hopeless Not at all   Total Score PHQ 2 0       Fall Risk Assessment:  :     No flowsheet data found. Abuse Screening:  :     No flowsheet data found. Coordination of Care Questionnaire:  :     1) Have you been to an emergency room, urgent care clinic since your last visit? No    Hospitalized since your last visit? No             2) Have you seen or consulted any other health care providers outside of [address] [place] [address] since your last visit? No  (Include any pap smears or colon screenings in this section.)  No    Patient is accompanied by self I have received verbal consent from Gwen Ambriz to discuss any/all medical information while they are present in the room.

## 2020-01-28 ENCOUNTER — TELEPHONE (OUTPATIENT)
Dept: FAMILY MEDICINE CLINIC | Age: 60
End: 2020-01-28

## 2020-01-29 DIAGNOSIS — J45.20 MILD INTERMITTENT ASTHMA, UNSPECIFIED WHETHER COMPLICATED: ICD-10-CM

## 2020-01-29 DIAGNOSIS — J45.20 MILD INTERMITTENT ASTHMA, UNSPECIFIED WHETHER COMPLICATED: Primary | ICD-10-CM

## 2020-01-29 RX ORDER — NEBULIZER AND COMPRESSOR
EACH MISCELLANEOUS
Qty: 1 EACH | Refills: 0 | Status: SHIPPED | OUTPATIENT
Start: 2020-01-29

## 2020-01-29 RX ORDER — NEBULIZER AND COMPRESSOR
EACH MISCELLANEOUS
Qty: 1 EACH | Refills: 0 | Status: SHIPPED | OUTPATIENT
Start: 2020-01-29 | End: 2020-01-29 | Stop reason: SDUPTHER

## 2020-01-29 NOTE — TELEPHONE ENCOUNTER
----- Message from Dirk Bello sent at 1/29/2020 11:11 AM EST -----  Regarding: Dr. Vanessa Ge telephone  General Message/Vendor Calls    Caller's first and last name:      Reason for call:  Pt is requesting that nebulizer be sent to [facility] [phone] would like to void last message   given they are unable to fill prescription.     Callback required yes/no and why:  Yes     Best contact number(s):    [phone]  Details to clarify the request:      Dirk Bello

## 2020-01-29 NOTE — TELEPHONE ENCOUNTER
Pt called back saying [facility] could not process request for nebulizer but they can at [address] They said they do not have a 'mini' but can do a regular size machine.

## 2020-02-04 ENCOUNTER — TELEPHONE (OUTPATIENT)
Dept: FAMILY MEDICINE CLINIC | Age: 60
End: 2020-02-04

## 2020-02-04 NOTE — TELEPHONE ENCOUNTER
----- Message from Rosario Katz sent at 2/3/2020  4:38 PM EST -----  Regarding: Dr. Mack King first and last name: Sarahlauragoran Merida  Reason for call:  f/up on Rx since it could not be done at usual pharmacy  Callback required yes/no and why: yes  Best contact number(s): [phone]  Details to clarify the request: f/up on a nevalizer machine for asthma. Original pharmacy does not carry machine and she gave alternate options to send the Rx to.  Pt is f/up to see if it was done or not

## 2020-02-04 NOTE — TELEPHONE ENCOUNTER
I HAD PRINTED & SIGNED THE PRESCRIPTION LAST WEEK TO BE FAXED MANUALLY. NOT SURE ON THE STATUS AFTER THAT.

## 2020-02-06 ENCOUNTER — OFFICE VISIT (OUTPATIENT)
Dept: SLEEP MEDICINE | Age: 60
End: 2020-02-06

## 2020-02-06 VITALS
BODY MASS INDEX: 38.87 KG/M2 | SYSTOLIC BLOOD PRESSURE: 138 MMHG | OXYGEN SATURATION: 96 % | DIASTOLIC BLOOD PRESSURE: 81 MMHG | HEART RATE: 77 BPM | HEIGHT: 66 IN | TEMPERATURE: 97.9 F | WEIGHT: 241.9 LBS | RESPIRATION RATE: 19 BRPM

## 2020-02-06 DIAGNOSIS — E11.9 CONTROLLED TYPE 2 DIABETES MELLITUS WITHOUT COMPLICATION, WITHOUT LONG-TERM CURRENT USE OF INSULIN (HCC): ICD-10-CM

## 2020-02-06 DIAGNOSIS — G47.33 OBSTRUCTIVE SLEEP APNEA SYNDROME: Primary | ICD-10-CM

## 2020-02-06 NOTE — PROGRESS NOTES
[address] [facility] [place]. [place], [address]  Tel.  [phone]  Fax. [address] [facility] [address]  [place], [address] [facility]  Tel.  [phone]  Fax. [phone] [address]  Tel.  [phone]  Fax. [phone]         Subjective:      Sonya Mueller is an 64 y.o. female referred for evaluation for a sleep disorder. She complains of snoring, periods of not breathing associated with excessive daytime sleepiness. Symptoms began several years ago, unchanged since that time. She usually can fall asleep in hours. Family or house members note snoring, periods of not breathing. She denies falling asleep while driving. Sonya Mueller does wake up frequently at night. She is bothered by waking up too early and left unable to get back to sleep. She actually sleeps about 7 hours at night and wakes up about 3 times during the night. She does work shifts:  First Shift. Harley Mac indicates she does get too little sleep at night. Her bedtime is 1000. She awakens at 0800. She does take naps. She takes 5 naps a week lasting 2, 3, Hour(s). She has the following observed behaviors: Loud snoring, Light snoring, Pauses in breathing, Twitching of legs or feet; Nightmares. 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 Her  uses a CPAP  [place] Sleepiness Score: 11   which reflect mild daytime drowsiness. Allergies   Allergen Reactions    Percocet [Oxycodone-Acetaminophen] Other (comments)     \"It makes me feel bad, real bad,like I have the flu. \"         Current Outpatient Medications:     Nebulizer & Compressor machine, Use every 6 hours as needed for shortness of breath, Disp: 1 Each, Rfl: 0    multivitamin (DAILY VITAMINS PO), Take  by mouth., Disp: , Rfl:     omega 3-DHA-EPA-fish oil (FISH OIL) 1,000 mg (120 mg-180 mg) capsule, Take 1 Cap by mouth daily. Indications: high amount of triglyceride in the blood, Disp: 90 Cap, Rfl: 1    metFORMIN (GLUCOPHAGE) 500 mg tablet, Take 2 Tabs by mouth two (2) times daily (with meals). , Disp: 240 Tab, Rfl: 1    budesonide-formoterol (SYMBICORT) 80-4.5 mcg/actuation HFAA, Take 2 Puffs by inhalation two (2) times a day., Disp: 1 Inhaler, Rfl: 1    montelukast (SINGULAIR) 10 mg tablet, Take 1 Tab by mouth daily. , Disp: 30 Tab, Rfl: 1    guaiFENesin ER (MUCINEX) 600 mg ER tablet, Take 600 mg by mouth two (2) times a day., Disp: , Rfl:     latanoprost (XALATAN) 0.005 % ophthalmic solution, Administer 1 Drop to both eyes nightly., Disp: , Rfl:     diclofenac EC (VOLTAREN) 50 mg EC tablet, Take 50 mg by mouth two (2) times a day., Disp: , Rfl:     predniSONE (STERAPRED DS) 10 mg dose pack, Take 1 Tab by mouth See Admin Instructions. See administration instruction per 10mg dose pack, Disp: 21 Tab, Rfl: 0     She  has a past medical history of Adverse effect of anesthesia, Anemia, Anxiety, Glucose intolerance (impaired glucose tolerance), and Menopause (2018). She  has a past surgical history that includes hx lap cholecystectomy; hx orthopaedic (Left, 2015); hx orthopaedic (Left); hx  section; hx carpal tunnel release (Bilateral); hx hysterectomy (2018); and hx oophorectomy (Bilateral, 2018). She family history includes Alzheimer in her maternal grandmother; Diabetes in her father, mother, and sister; Hypertension in her father; Kidney Disease in her father; Other in her mother; Stroke in her mother. She  reports that she quit smoking about 20 years ago. She has a 3.75 pack-year smoking history. She has never used smokeless tobacco. She reports current alcohol use of about 1.0 standard drinks of alcohol per week. She reports that she does not use drugs. Review of Systems:  Constitutional:+ weight gain. Eyes:  No blurred vision.   CVS:  No significant chest pain  Pulm:  +significant shortness of breath when her asthma is acting up  GI:  No significant nausea or vomiting  :  + significant nocturia  Musculoskeletal:  No significant joint pain at night  Skin:  No significant rashes  Neuro:  No significant dizziness   Psych:  No active mood issues    Sleep Review of Systems: notable for + difficulty falling asleep; +frequent awakenings at night;  regular dreaming noted; no nightmares ; no early morning headaches; no memory problems; no concentration issues; no history of any automobile or occupational accidents due to daytime drowsiness. Objective:     Visit Vitals  /81 (BP 1 Location: Left arm, BP Patient Position: Sitting)   Pulse 77   Temp 97.9 °F (36.6 °C) (Oral)   Resp 19   Ht 5' 6\" (1.676 m)   Wt 241 lb 14.4 oz (109.7 kg)   SpO2 96%   BMI 39.04 kg/m²         General:   Not in acute distress   Eyes:  Anicteric sclerae, no obvious strabismus   Nose:  No obvious nasal septum deviation    Oropharynx:   Class 3 oropharyngeal outlet, thick tongue base, enlarged and boggy uvula, low-lying soft palate, narrow tonsilo-pharyngeal pilars   Tonsils:   tonsils are present and normal   Neck:   Neck circ. in \"inches\": 16; midline trachea   Chest/Lungs:  Equal lung expansion, clear on auscultation    CVS:  Normal rate, regular rhythm; no JVD   Skin:  Warm to touch; no obvious rashes   Neuro:  No focal deficits ; no obvious tremor    Psych:  Normal affect,  normal countenance;          Assessment:       ICD-10-CM ICD-9-CM    1. Obstructive sleep apnea syndrome G47.33 327.23 SLEEP STUDY UNATTENDED, 4 CHANNEL   2. Controlled type 2 diabetes mellitus without complication, without long-term current use of insulin ([facility]) E11.9 250.00          Plan:     * The patient currently has a High Risk for having sleep apnea. STOP-BANG score 6.  * PSG was ordered for initial evaluation. I have reviewed the different types of sleep studies. Attended sleep studies and home sleep apnea tests.  Home sleep testing tests only for the presence and severity of sleep apnea. she understands that if the HSAT does not provide reliable result(such as poor data/failed HSAT recording), she may have to repeat the HSAT or come in for an attended polysomnogram.   * She was provided information on sleep apnea including coresponding risk factors and the importance of proper treatment. * Counseling was provided regarding proper sleep hygiene and safe driving. * Treatment options for sleep apnea were reviewed. she is not against a trial of PAP if found to have significant sleep apnea. The treatment plan was reviewed with the patient in detail and reviewed with the patient and the lead technologist. she understands that the lead technologist will be calling her  with the results and assisting with the next step in the treatment plan as outlined today during the consultation with me. All of her questions were addressed. 2. Type II diabetes - she continues on her current regimen. I have reviewed the relationship between sleep disordered breathing as it relates to diabetes. 3. Obesity - I have counseled the patient regarding the benefits of weight loss. Thank you for allowing us to participate in your patient's medical care. We'll keep you updated on these investigations.   Electronically signed by    Savanah Rubio MD  Diplomate in Sleep Medicine  [facility]

## 2020-02-21 ENCOUNTER — HOSPITAL ENCOUNTER (OUTPATIENT)
Dept: SLEEP MEDICINE | Age: 60
Discharge: HOME | End: 2020-02-21
Payer: COMMERCIAL

## 2020-02-21 PROCEDURE — 95806 SLEEP STUDY UNATT&RESP EFFT: CPT | Performed by: INTERNAL MEDICINE

## 2020-02-26 ENCOUNTER — TELEPHONE (OUTPATIENT)
Dept: SLEEP MEDICINE | Age: 60
End: 2020-02-26

## 2020-02-26 DIAGNOSIS — G47.33 OBSTRUCTIVE SLEEP APNEA (ADULT) (PEDIATRIC): Primary | ICD-10-CM

## 2020-02-26 NOTE — TELEPHONE ENCOUNTER
Results of sleep study in [facility]-drive  Lead tech to convey results to patient  Results of HSAT were equivocal for sleep apnea. I recommend she come into the sleep center for an attended polysomnogram for further evaluation/clarification. The attended sleep study will provide more information about sleep stages as her home sleep study was borderline/equivocal for sleep apnea (AHI 5.6/hour).

## 2020-02-27 NOTE — TELEPHONE ENCOUNTER
Reviewed sleep study results with patient. She expressed understanding and is willing to proceed with an attended sleep study .

## 2020-03-31 ENCOUNTER — OFFICE VISIT (OUTPATIENT)
Dept: RHEUMATOLOGY | Age: 60
End: 2020-03-31

## 2020-03-31 VITALS
WEIGHT: 236.2 LBS | BODY MASS INDEX: 38.12 KG/M2 | DIASTOLIC BLOOD PRESSURE: 85 MMHG | TEMPERATURE: 98 F | HEART RATE: 92 BPM | OXYGEN SATURATION: 98 % | SYSTOLIC BLOOD PRESSURE: 143 MMHG | RESPIRATION RATE: 16 BRPM

## 2020-03-31 DIAGNOSIS — M54.12 CERVICAL RADICULOPATHY: ICD-10-CM

## 2020-03-31 DIAGNOSIS — M54.89 INFLAMMATORY BACK PAIN: Primary | ICD-10-CM

## 2020-03-31 RX ORDER — MELATONIN
DAILY
COMMUNITY

## 2020-03-31 RX ORDER — NAPROXEN 500 MG/1
500 TABLET ORAL 2 TIMES DAILY WITH MEALS
Qty: 60 TAB | Refills: 6 | Status: SHIPPED | OUTPATIENT
Start: 2020-03-31 | End: 2020-04-30

## 2020-03-31 RX ORDER — LANOLIN ALCOHOL/MO/W.PET/CERES
CREAM (GRAM) TOPICAL
COMMUNITY

## 2020-03-31 RX ORDER — DOCUSATE SODIUM 100 MG/1
100 CAPSULE, LIQUID FILLED ORAL AS NEEDED
COMMUNITY
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
## 2020-03-31 NOTE — PROGRESS NOTES
Chief Complaint   Patient presents with    Joint Pain     1. Have you been to the ER, urgent care clinic since your last visit? Hospitalized since your last visit? No    2. Have you seen or consulted any other health care providers outside of the [address] [place] [address] since your last visit? Include any pap smears or colon screening.  No

## 2020-04-01 LAB
ALBUMIN SERPL-MCNC: 4.9 G/DL (ref 3.8–4.9)
ALBUMIN/GLOB SERPL: 2.3 {RATIO} (ref 1.2–2.2)
ALP SERPL-CCNC: 123 IU/L (ref 39–117)
ALT SERPL-CCNC: 87 IU/L (ref 0–32)
ANA TITR SER IF: NEGATIVE {TITER}
AST SERPL-CCNC: 58 IU/L (ref 0–40)
BASOPHILS # BLD MANUAL: 0.1 X10E3/UL (ref 0–0.2)
BASOPHILS NFR BLD MANUAL: 1 %
BILIRUB SERPL-MCNC: 0.4 MG/DL (ref 0–1.2)
BUN SERPL-MCNC: 13 MG/DL (ref 6–24)
BUN/CREAT SERPL: 15 (ref 9–23)
CALCIUM SERPL-MCNC: 10 MG/DL (ref 8.7–10.2)
CHLORIDE SERPL-SCNC: 99 MMOL/L (ref 96–106)
CO2 SERPL-SCNC: 21 MMOL/L (ref 20–29)
CREAT SERPL-MCNC: 0.84 MG/DL (ref 0.57–1)
CRP SERPL-MCNC: 10 MG/L (ref 0–10)
DIFFERENTIAL COMMENT, 115260: ABNORMAL
EOSINOPHIL # BLD MANUAL: 0.4 X10E3/UL (ref 0–0.4)
EOSINOPHIL NFR BLD MANUAL: 6 %
ERYTHROCYTE [DISTWIDTH] IN BLOOD BY AUTOMATED COUNT: 11.2 % (ref 11.7–15.4)
ERYTHROCYTE [SEDIMENTATION RATE] IN BLOOD BY WESTERGREN METHOD: 60 MM/HR (ref 0–40)
GLOBULIN SER CALC-MCNC: 2.1 G/DL (ref 1.5–4.5)
GLUCOSE SERPL-MCNC: 158 MG/DL (ref 65–99)
HCT VFR BLD AUTO: 38.9 % (ref 34–46.6)
HGB BLD-MCNC: 12.4 G/DL (ref 11.1–15.9)
LYMPHOCYTES # BLD MANUAL: 1.8 X10E3/UL (ref 0.7–3.1)
LYMPHOCYTES NFR BLD MANUAL: 28 %
MCH RBC QN AUTO: 26.2 PG (ref 26.6–33)
MCHC RBC AUTO-ENTMCNC: 31.9 G/DL (ref 31.5–35.7)
MCV RBC AUTO: 82 FL (ref 79–97)
MONOCYTES # BLD MANUAL: 0.4 X10E3/UL (ref 0.1–0.9)
MONOCYTES NFR BLD MANUAL: 7 %
NEUTROPHILS # BLD MANUAL: 3.8 X10E3/UL (ref 1.4–7)
NEUTROPHILS NFR BLD MANUAL: 61 %
PLATELET # BLD AUTO: 327 X10E3/UL (ref 150–450)
PLATELET BLD QL SMEAR: ADEQUATE
POTASSIUM SERPL-SCNC: 4.4 MMOL/L (ref 3.5–5.2)
PROT SERPL-MCNC: 7 G/DL (ref 6–8.5)
RBC # BLD AUTO: 4.74 X10E6/UL (ref 3.77–5.28)
RBC MORPH BLD: ABNORMAL
SODIUM SERPL-SCNC: 140 MMOL/L (ref 134–144)
WBC # BLD AUTO: 6.3 X10E3/UL (ref 3.4–10.8)

## 2020-04-24 ENCOUNTER — TELEPHONE (OUTPATIENT)
Dept: RHEUMATOLOGY | Age: 60
End: 2020-04-24

## 2020-04-24 NOTE — TELEPHONE ENCOUNTER
Spoke to pt, pt stated that she will give the office a call to reschedule her appointment after she has gotten her MRI and Xrays completed, I verbally acknowledged understanding also Dr Abe Santos is aware

## 2020-04-24 NOTE — TELEPHONE ENCOUNTER
----- Message from Nick Viramontes MD sent at 2020  1:12 PM EDT -----  Regarding: RE: Dr. Jane/ Telephone  We can do it after the imaging if she would like  ----- Message -----  From: Ab Oreilly LPN  Sent:   11:44 AM EDT  To: Nick Viramontes MD  Subject: FW: Dr. Jane/ Telephone                           ----- Message -----  From: Mono Aguiar  Sent: 2020   9:09 AM EDT  To: [facility] Nurse Pool  Subject: Dr. Barraza Vendor Message  To   Subject:   Patient      82-   ID numbers:  #[id number]  Caller's first and last name Patient  Reason for call:  Appointment   Best contact number(s [phone]  Details to clarify the request: Patient is scheduled for MRI and Xrays on May 28, 2020. Please contact her in reference to appointment scheduled for Tuesday, May 5, 2020 08:00 AM  to advise if it should be rescheduled.

## 2020-05-12 DIAGNOSIS — E11.9 CONTROLLED TYPE 2 DIABETES MELLITUS WITHOUT COMPLICATION, WITHOUT LONG-TERM CURRENT USE OF INSULIN (HCC): ICD-10-CM

## 2020-05-12 RX ORDER — METFORMIN HYDROCHLORIDE 500 MG/1
1000 TABLET ORAL 2 TIMES DAILY WITH MEALS
Qty: 240 TAB | Refills: 1 | Status: SHIPPED | OUTPATIENT
Start: 2020-05-12 | End: 2020-10-22

## 2020-05-28 ENCOUNTER — HOSPITAL ENCOUNTER (OUTPATIENT)
Dept: MRI IMAGING | Age: 60
Discharge: HOME | End: 2020-05-28
Attending: PEDIATRICS
Payer: MEDICAID

## 2020-05-28 DIAGNOSIS — M54.89 INFLAMMATORY BACK PAIN: ICD-10-CM

## 2020-05-28 DIAGNOSIS — M54.12 CERVICAL RADICULOPATHY: ICD-10-CM

## 2020-05-28 PROCEDURE — 72197 MRI PELVIS W/O & W/DYE: CPT

## 2020-05-28 PROCEDURE — 72141 MRI NECK SPINE W/O DYE: CPT

## 2020-05-28 PROCEDURE — A9575 INJ GADOTERATE MEGLUMI 0.1ML: HCPCS | Performed by: PEDIATRICS

## 2020-05-28 PROCEDURE — 74011250636 HC RX REV CODE- 250/636: Performed by: PEDIATRICS

## 2020-05-28 RX ORDER — GADOTERATE MEGLUMINE 376.9 MG/ML
20 INJECTION INTRAVENOUS
Status: COMPLETED | OUTPATIENT
Start: 2020-05-28 | End: 2020-05-28

## 2020-05-28 RX ADMIN — GADOTERATE MEGLUMINE 20 ML: 376.9 INJECTION INTRAVENOUS at 11:42

## 2020-06-05 ENCOUNTER — HOSPITAL ENCOUNTER (OUTPATIENT)
Dept: MAMMOGRAPHY | Age: 60
Discharge: HOME | End: 2020-06-05
Payer: MEDICAID

## 2020-06-05 ENCOUNTER — TELEPHONE (OUTPATIENT)
Dept: SLEEP MEDICINE | Age: 60
End: 2020-06-05

## 2020-06-05 DIAGNOSIS — Z12.31 VISIT FOR SCREENING MAMMOGRAM: ICD-10-CM

## 2020-06-05 PROCEDURE — 77067 SCR MAMMO BI INCL CAD: CPT

## 2020-07-07 ENCOUNTER — TELEPHONE (OUTPATIENT)
Dept: RHEUMATOLOGY | Age: 60
End: 2020-07-07

## 2020-07-07 NOTE — TELEPHONE ENCOUNTER
----- Message from Toshia Harding MD sent at 7/7/2020  1:11 PM EDT -----  Regarding: RE: Dr Jane/telephone  Can you tell her that the results do not show inflammatory disease of the spine. How did she do on the medication we prescribed?   ----- Message -----  From: Lillie Pan RN  Sent: 6/26/2020  10:14 AM EDT  To: Toshia Harding MD  Subject: FW: Dr Jane/telephone                             ----- Message -----  From: Ayaka Ding  Sent: 6/26/2020  10:03 AM EDT  To: [facility] Nurse [facility]  Subject: Dr Saw Hernandez is calling regarding her Test Results, has been waiting 4 weeks now, to speak to the doctor, she does not understand her results she would like the doctor to explain it , please call pt .

## 2020-07-07 NOTE — TELEPHONE ENCOUNTER
Spoke to pt informed pt of Dr Burciaga Camp message below  Can you tell her that the results do not show inflammatory disease of the spine.  How did she do on the medication we prescribed?    Pt stated that the medication works ok, pt also stated that she would like to go over the results in person, pt was scheduled for the next available appt

## 2020-07-17 ENCOUNTER — OFFICE VISIT (OUTPATIENT)
Dept: RHEUMATOLOGY | Age: 60
End: 2020-07-17

## 2020-07-17 VITALS
TEMPERATURE: 97.3 F | SYSTOLIC BLOOD PRESSURE: 145 MMHG | BODY MASS INDEX: 38.09 KG/M2 | DIASTOLIC BLOOD PRESSURE: 82 MMHG | RESPIRATION RATE: 16 BRPM | OXYGEN SATURATION: 97 % | WEIGHT: 236 LBS | HEART RATE: 79 BPM

## 2020-07-17 DIAGNOSIS — M54.12 CERVICAL RADICULOPATHY: ICD-10-CM

## 2020-07-17 DIAGNOSIS — M54.89 INFLAMMATORY BACK PAIN: Primary | ICD-10-CM

## 2020-07-17 RX ORDER — PREDNISONE 5 MG/1
5 TABLET ORAL 2 TIMES DAILY
Qty: 60 TAB | Refills: 0 | Status: SHIPPED | OUTPATIENT
Start: 2020-07-17 | End: 2020-08-14

## 2020-07-17 NOTE — PROGRESS NOTES
Chief Complaint   Patient presents with    Joint Pain     1. Have you been to the ER, urgent care clinic since your last visit? Hospitalized since your last visit? No    2. Have you seen or consulted any other health care providers outside of the [address] [place] [address] since your last visit? Include any pap smears or colon screening.  No
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
## 2020-08-09 ENCOUNTER — HOSPITAL ENCOUNTER (OUTPATIENT)
Dept: SLEEP MEDICINE | Age: 60
Discharge: HOME | End: 2020-08-09
Payer: MEDICAID

## 2020-08-09 DIAGNOSIS — G47.33 OBSTRUCTIVE SLEEP APNEA (ADULT) (PEDIATRIC): ICD-10-CM

## 2020-08-09 PROCEDURE — 95810 POLYSOM 6/> YRS 4/> PARAM: CPT | Performed by: INTERNAL MEDICINE

## 2020-08-10 VITALS
TEMPERATURE: 97.2 F | OXYGEN SATURATION: 97 % | DIASTOLIC BLOOD PRESSURE: 75 MMHG | WEIGHT: 241 LBS | BODY MASS INDEX: 38.73 KG/M2 | HEART RATE: 83 BPM | SYSTOLIC BLOOD PRESSURE: 122 MMHG | HEIGHT: 66 IN

## 2020-08-14 RX ORDER — PREDNISONE 5 MG/1
TABLET ORAL
Qty: 60 TAB | Refills: 0 | Status: SHIPPED | OUTPATIENT
Start: 2020-08-14 | End: 2020-10-06

## 2020-08-20 ENCOUNTER — TELEPHONE (OUTPATIENT)
Dept: SLEEP MEDICINE | Age: 60
End: 2020-08-20

## 2020-08-20 DIAGNOSIS — G47.33 OBSTRUCTIVE SLEEP APNEA (ADULT) (PEDIATRIC): Primary | ICD-10-CM

## 2020-08-20 NOTE — TELEPHONE ENCOUNTER
Results of sleep study in R-drive  Lead tech to convey results to patient  HSAT results in R-drive. Test positive for signficant sleep apnea. AHI 18/hour and lowest oxygen saturation was 78%. Data was clear to interpret. (she previously had an equivocal HSAT) We had discussed treatment options at initial consultation. Based on the results of the home sleep apnea test, I believe a trial of APAP would be an effective mode of therapy. APAP order attached. she should be seen in the sleep disorder center 4-6 weeks after initiating PAP therapy. The APAP will have modem access so she can call the sleep center if she  has questions/concerns regarding the initial PAP settings. Front staff to Order PAP and call patient and let them know which DME company they should be hearing from after results reviewed with lead support technologist.   Schedule for first adherence visit in 6 weeks.

## 2020-08-21 ENCOUNTER — DOCUMENTATION ONLY (OUTPATIENT)
Dept: SLEEP MEDICINE | Age: 60
End: 2020-08-21

## 2020-10-06 ENCOUNTER — OFFICE VISIT (OUTPATIENT)
Dept: FAMILY MEDICINE CLINIC | Age: 60
End: 2020-10-06
Payer: MEDICAID

## 2020-10-06 VITALS
WEIGHT: 223 LBS | RESPIRATION RATE: 16 BRPM | DIASTOLIC BLOOD PRESSURE: 81 MMHG | HEIGHT: 66 IN | TEMPERATURE: 98.1 F | SYSTOLIC BLOOD PRESSURE: 120 MMHG | OXYGEN SATURATION: 98 % | BODY MASS INDEX: 35.84 KG/M2 | HEART RATE: 86 BPM

## 2020-10-06 DIAGNOSIS — E78.5 HYPERLIPIDEMIA, UNSPECIFIED HYPERLIPIDEMIA TYPE: ICD-10-CM

## 2020-10-06 DIAGNOSIS — E11.9 CONTROLLED TYPE 2 DIABETES MELLITUS WITHOUT COMPLICATION, WITHOUT LONG-TERM CURRENT USE OF INSULIN (HCC): Primary | ICD-10-CM

## 2020-10-06 DIAGNOSIS — E55.9 VITAMIN D DEFICIENCY: ICD-10-CM

## 2020-10-06 DIAGNOSIS — E11.9 CONTROLLED TYPE 2 DIABETES MELLITUS WITHOUT COMPLICATION, WITHOUT LONG-TERM CURRENT USE OF INSULIN (HCC): ICD-10-CM

## 2020-10-06 DIAGNOSIS — E61.1 IRON DEFICIENCY: ICD-10-CM

## 2020-10-06 LAB
25(OH)D3 SERPL-MCNC: 25.2 NG/ML (ref 30–100)
ALBUMIN SERPL-MCNC: 4.1 G/DL (ref 3.5–5)
ALBUMIN/GLOB SERPL: 1.1 {RATIO} (ref 1.1–2.2)
ALP SERPL-CCNC: 136 U/L (ref 45–117)
ALT SERPL-CCNC: 54 U/L (ref 12–78)
ANION GAP SERPL CALC-SCNC: 7 MMOL/L (ref 5–15)
APPEARANCE UR: CLEAR
AST SERPL-CCNC: 33 U/L (ref 15–37)
BASOPHILS # BLD: 0.1 K/UL (ref 0–0.1)
BASOPHILS NFR BLD: 1 % (ref 0–1)
BILIRUB SERPL-MCNC: 0.7 MG/DL (ref 0.2–1)
BILIRUB UR QL: NEGATIVE
BUN SERPL-MCNC: 11 MG/DL (ref 6–20)
BUN/CREAT SERPL: 10 (ref 12–20)
CALCIUM SERPL-MCNC: 9.8 MG/DL (ref 8.5–10.1)
CHLORIDE SERPL-SCNC: 101 MMOL/L (ref 97–108)
CHOLEST SERPL-MCNC: 220 MG/DL
CO2 SERPL-SCNC: 27 MMOL/L (ref 21–32)
COLOR UR: ABNORMAL
COMMENT, HOLDF: NORMAL
CREAT SERPL-MCNC: 1.07 MG/DL (ref 0.55–1.02)
CREAT UR-MCNC: 120 MG/DL
DIFFERENTIAL METHOD BLD: NORMAL
EOSINOPHIL # BLD: 0.2 K/UL (ref 0–0.4)
EOSINOPHIL NFR BLD: 4 % (ref 0–7)
ERYTHROCYTE [DISTWIDTH] IN BLOOD BY AUTOMATED COUNT: 11.7 % (ref 11.5–14.5)
EST. AVERAGE GLUCOSE BLD GHB EST-MCNC: 269 MG/DL
GLOBULIN SER CALC-MCNC: 3.7 G/DL (ref 2–4)
GLUCOSE SERPL-MCNC: 294 MG/DL (ref 65–100)
GLUCOSE UR STRIP.AUTO-MCNC: >1000 MG/DL
HBA1C MFR BLD: 11 % (ref 4–5.6)
HCT VFR BLD AUTO: 44.6 % (ref 35–47)
HDLC SERPL-MCNC: 60 MG/DL
HDLC SERPL: 3.7 {RATIO} (ref 0–5)
HGB BLD-MCNC: 13.6 G/DL (ref 11.5–16)
HGB UR QL STRIP: NEGATIVE
IMM GRANULOCYTES # BLD AUTO: 0 K/UL (ref 0–0.04)
IMM GRANULOCYTES NFR BLD AUTO: 0 % (ref 0–0.5)
IRON SATN MFR SERPL: 38 % (ref 20–50)
IRON SERPL-MCNC: 122 UG/DL (ref 35–150)
KETONES UR QL STRIP.AUTO: 15 MG/DL
LDLC SERPL CALC-MCNC: 124.8 MG/DL (ref 0–100)
LEUKOCYTE ESTERASE UR QL STRIP.AUTO: NEGATIVE
LIPID PROFILE,FLP: ABNORMAL
LYMPHOCYTES # BLD: 1.6 K/UL (ref 0.8–3.5)
LYMPHOCYTES NFR BLD: 26 % (ref 12–49)
MCH RBC QN AUTO: 27.1 PG (ref 26–34)
MCHC RBC AUTO-ENTMCNC: 30.5 G/DL (ref 30–36.5)
MCV RBC AUTO: 89 FL (ref 80–99)
MICROALBUMIN UR-MCNC: 1.71 MG/DL
MICROALBUMIN/CREAT UR-RTO: 14 MG/G (ref 0–30)
MONOCYTES # BLD: 0.3 K/UL (ref 0–1)
MONOCYTES NFR BLD: 5 % (ref 5–13)
NEUTS SEG # BLD: 4 K/UL (ref 1.8–8)
NEUTS SEG NFR BLD: 64 % (ref 32–75)
NITRITE UR QL STRIP.AUTO: NEGATIVE
NRBC # BLD: 0 K/UL (ref 0–0.01)
NRBC BLD-RTO: 0 PER 100 WBC
PH UR STRIP: 6 [PH] (ref 5–8)
PLATELET # BLD AUTO: 283 K/UL (ref 150–400)
PMV BLD AUTO: 11.1 FL (ref 8.9–12.9)
POTASSIUM SERPL-SCNC: 3.9 MMOL/L (ref 3.5–5.1)
PROT SERPL-MCNC: 7.8 G/DL (ref 6.4–8.2)
PROT UR STRIP-MCNC: NEGATIVE MG/DL
RBC # BLD AUTO: 5.01 M/UL (ref 3.8–5.2)
SAMPLES BEING HELD,HOLD: NORMAL
SODIUM SERPL-SCNC: 135 MMOL/L (ref 136–145)
SP GR UR REFRACTOMETRY: 1.02 (ref 1–1.03)
TIBC SERPL-MCNC: 320 UG/DL (ref 250–450)
TRIGL SERPL-MCNC: 176 MG/DL (ref ?–150)
UROBILINOGEN UR QL STRIP.AUTO: 1 EU/DL (ref 0.2–1)
VLDLC SERPL CALC-MCNC: 35.2 MG/DL
WBC # BLD AUTO: 6.3 K/UL (ref 3.6–11)

## 2020-10-06 PROCEDURE — 99214 OFFICE O/P EST MOD 30 MIN: CPT | Performed by: FAMILY MEDICINE

## 2020-10-06 RX ORDER — NAPROXEN 500 MG/1
TABLET ORAL
COMMUNITY
Start: 2020-09-14 | End: 2020-11-18

## 2020-10-06 RX ORDER — CICLOPIROX 80 MG/ML
SOLUTION TOPICAL
COMMUNITY
Start: 2020-09-28

## 2020-10-06 RX ORDER — SOLIFENACIN SUCCINATE 5 MG/1
TABLET, FILM COATED ORAL
COMMUNITY
Start: 2020-10-02 | End: 2022-05-16 | Stop reason: SDUPTHER

## 2020-10-06 NOTE — PROGRESS NOTES
Identified pt with two pt identifiers(name and ). Reviewed record in preparation for visit and have obtained necessary documentation. Chief Complaint   Patient presents with    Follow-up        Health Maintenance Due   Topic    Pneumococcal 0-64 years (1 of 1 - PPSV23)    Foot Exam Q1     MICROALBUMIN Q1     Eye Exam Retinal or Dilated     Shingrix Vaccine Age 50> (1 of 2)       Visit Vitals  Blood Pressure 120/81 (BP 1 Location: Left arm, BP Patient Position: Sitting)   Pulse 86   Temperature 98.1 °F (36.7 °C) (Oral)   Respiration 16   Height 5' 6\" (1.676 m)   Weight 223 lb (101.2 kg)   Oxygen Saturation 98%   Body Mass Index 35.99 kg/m²         Coordination of Care Questionnaire:  :   1) Have you been to an emergency room, urgent care, or hospitalized since your last visit? NO      2. Have seen or consulted any other health care provider since your last visit? NO        Patient is accompanied by  I have received verbal consent from Herberth Borrego to discuss any/all medical information while they are present in the room.

## 2020-10-06 NOTE — PROGRESS NOTES
Hassan Essex is a 64 y.o. female who presents today with the following:    HPI  Chief Complaint   Patient presents with    Follow-up       1. Controlled type 2 diabetes mellitus without complication, without long-term current use of insulin ([address]  Diabetes  Patient presents today for diabetes follow up. Pt. current diabetic medications include none  Patient has stopped taking metformin since last 3 months. Current monitoring regimen: office lab tests - 2 times yearly. Home blood sugar records: fasting range: Does not check. Any episodes of hypoglycemia? no. Known diabetic complications: none. Cardiovascular risk factors: dyslipidemia. Not currently on ASA, statin, ACE/ARB. Diabetic Foot and Eye Exam HM Status   Topic Date Due    Diabetic Foot Care  01/04/1970    Eye Exam  01/04/1970     There is no immunization history for the selected administration types on file for this patient. No results found for: MCACR, MCA1, MCA2, MCA3, MCAU, MCAU2, MCALPOCT  Lab Results   Component Value Date/Time    Hemoglobin A1c 6.8 (H) 12/19/2019 11:15 AM    Hemoglobin A1c (POC) 5.9 (A) 10/10/2018 04:04 PM           2. Vitamin D deficiency  Not taking vitamin D supplement. Request to check labs. 3. Hyperlipidemia, unspecified hyperlipidemia type    Patient presents today for hyperlipidemia. Patient currently taking not taking cholesterol medication. Trying to follow a low cholesterol diet. Exercising moderate    Lab Results   Component Value Date/Time    Cholesterol, total 190 12/19/2019 11:15 AM    Cholesterol (POC) 173 05/31/2017 02:21 PM    HDL Cholesterol 63 12/19/2019 11:15 AM    HDL Cholesterol (POC) 66 05/31/2017 02:21 PM    LDL Cholesterol (POC) 85 05/31/2017 02:21 PM    LDL, calculated 93.6 12/19/2019 11:15 AM    VLDL, calculated 33.4 12/19/2019 11:15 AM    Triglyceride 167 (H) 12/19/2019 11:15 AM    Triglycerides (POC) 113 05/31/2017 02:21 PM    CHOL/HDL Ratio 3.0 12/19/2019 11:15 AM       4.  Iron deficiency  Not taking iron supplement. Requests recheck labs. Review of Systems   Constitutional: Negative. HENT: Negative. Eyes: Negative. Respiratory: Negative. Cardiovascular: Negative. Gastrointestinal: Negative. Genitourinary: Negative. Musculoskeletal: Negative. Skin: Negative. Neurological: Negative. Endo/Heme/Allergies: Negative. Psychiatric/Behavioral: Negative. Physical Exam  Vitals signs and nursing note reviewed. HENT:      Head: Normocephalic and atraumatic. Right Ear: External ear normal.      Left Ear: External ear normal.      Nose: Nose normal.   Eyes:      Conjunctiva/sclera: Conjunctivae normal.   Neck:      Musculoskeletal: Normal range of motion and neck supple. Thyroid: No thyromegaly. Cardiovascular:      Rate and Rhythm: Normal rate and regular rhythm. Pulmonary:      Effort: Pulmonary effort is normal.      Breath sounds: Normal breath sounds. Abdominal:      General: Bowel sounds are normal. There is no distension. Palpations: Abdomen is soft. Tenderness: There is no abdominal tenderness. Musculoskeletal: Normal range of motion. Lymphadenopathy:      Cervical: No cervical adenopathy. Skin:     General: Skin is warm and dry. Neurological:      Mental Status: She is alert and oriented to person, place, and time. Psychiatric:         Mood and Affect: Mood and affect normal.       /81 (BP 1 Location: Left arm, BP Patient Position: Sitting)   Pulse 86   Temp 98.1 °F (36.7 °C) (Oral)   Resp 16   Ht 5' 6\" (1.676 m)   Wt 223 lb (101.2 kg)   SpO2 98%   BMI 35.99 kg/m²     Allergies   Allergen Reactions    Percocet [Oxycodone-Acetaminophen] Other (comments)     \"It makes me feel bad, real bad,like I have the flu. \"       Current Outpatient Medications   Medication Sig    naproxen (NAPROSYN) 500 mg tablet TAKE 1 TAB BY MOUTH TWO (2) TIMES DAILY (WITH MEALS) FOR 30 DAYS.     solifenacin (VESICARE) 5 mg tablet     ciclopirox (PENLAC) 8 % solution APPLY TOPICALLY ONCE DAILY    ferrous sulfate (Iron) 325 mg (65 mg iron) tablet Take  by mouth Daily (before breakfast).  docusate sodium (Stool Softener) 100 mg capsule Take 100 mg by mouth two (2) times a day.  Nebulizer & Compressor machine Use every 6 hours as needed for shortness of breath    multivitamin (DAILY VITAMINS PO) Take  by mouth.  omega 3-DHA-EPA-fish oil (FISH OIL) 1,000 mg (120 mg-180 mg) capsule Take 1 Cap by mouth daily. Indications: high amount of triglyceride in the blood    budesonide-formoterol (SYMBICORT) 80-4.5 mcg/actuation HFAA Take 2 Puffs by inhalation two (2) times a day.  montelukast (SINGULAIR) 10 mg tablet Take 1 Tab by mouth daily.  latanoprost (XALATAN) 0.005 % ophthalmic solution Administer 1 Drop to both eyes nightly.  metFORMIN (GLUCOPHAGE) 500 mg tablet Take 2 Tabs by mouth two (2) times daily (with meals).  cholecalciferol (Vitamin D3) (1000 Units /25 mcg) tablet Take  by mouth daily.  diclofenac EC (VOLTAREN) 50 mg EC tablet Take 50 mg by mouth two (2) times a day. No current facility-administered medications for this visit.         Past Medical History:   Diagnosis Date    Adverse effect of anesthesia     WITHIN 20-30 MINUTES AFTER WAKING UP FROM CARPAL TUNNEL SURGERY, HAD DIFFICULTY BREATHING ,     Anemia     Anxiety     Glucose intolerance (impaired glucose tolerance)     Menopause 2018       Past Surgical History:   Procedure Laterality Date    HX CARPAL TUNNEL RELEASE Bilateral     HX  SECTION      HX HYSTERECTOMY  2018    HX LAP CHOLECYSTECTOMY      HX OOPHORECTOMY Bilateral 2018    HX ORTHOPAEDIC Left 2015    SPUR REMOVED TO LEFT FOOT    HX ORTHOPAEDIC Left     SPIN AND SCREW IN LEFT ARM       Problem List  Date Reviewed: 10/6/2020          Codes Class Noted    Stridor ICD-10-CM: R06.1  ICD-9-CM: 786.1  2018        Severe obesity ([facility] [address] ICD-10-CM: E66.01  ICD-9-CM: 278.01  10/10/2018        SOB (shortness of breath) ICD-10-CM: R06.02  ICD-9-CM: 786.05  10/10/2018        Pain of right hip joint ICD-10-CM: M25.551  ICD-9-CM: 719.45  10/10/2018        Obstructive sleep apnea syndrome ICD-10-CM: G47.33  ICD-9-CM: 327.23  2/2/2018        Snoring ICD-10-CM: R06.83  ICD-9-CM: 786.09  11/29/2017        Glucose intolerance (impaired glucose tolerance) ICD-10-CM: R73.02  ICD-9-CM: 790.22  Unknown        Anemia ICD-10-CM: D64.9  ICD-9-CM: 285. 9  Unknown               No results found for this visit on 10/06/20.      1. Controlled type 2 diabetes mellitus without complication, without long-term current use of insulin ([facility] [address]    - HEMOGLOBIN A1C WITH EAG; Future  - URINALYSIS W/ RFLX MICROSCOPIC; Future  - CBC WITH AUTOMATED DIFF; Future  - METABOLIC PANEL, COMPREHENSIVE; Future  - MICROALBUMIN, UR, RAND W/ MICROALB/CREAT RATIO; Future    2. Vitamin D deficiency    - VITAMIN D, 25 HYDROXY; Future    3. Hyperlipidemia, unspecified hyperlipidemia type    - LIPID PANEL; Future  - METABOLIC PANEL, COMPREHENSIVE; Future  - THYROID CASCADE PROFILE; Future    4. Iron deficiency    - CBC WITH AUTOMATED DIFF; Future  - IRON PROFILE; Future        Follow-up and Dispositions    · Return in about 6 months (around 4/6/2021) for follow up. I ADVISED PATIENT TO GO TO ER IF SYMPTOMS WORSEN , CHANGE OR FAILS TO IMPROVE. I have discussed the diagnosis with the patient and the intended plan as seen in the above orders. The patient has received an after-visit summary and questions were answered concerning future plans. I have discussed medication side effects and warnings with the patient as well. The patient agrees and understands above plan.            Shawna Jalloh MD

## 2020-10-07 LAB — TSH SERPL-ACNC: 0.8 UIU/ML (ref 0.45–4.5)

## 2020-10-07 NOTE — PROGRESS NOTES
Please call inform patient HbA1c is elevated at 11. Patient to bring blood sugar log to restart diabetes medication. Patient to make appointment to discuss abnormal labs.   Thanks

## 2020-10-22 DIAGNOSIS — E11.9 CONTROLLED TYPE 2 DIABETES MELLITUS WITHOUT COMPLICATION, WITHOUT LONG-TERM CURRENT USE OF INSULIN (HCC): ICD-10-CM

## 2020-10-22 RX ORDER — METFORMIN HYDROCHLORIDE 500 MG/1
1000 TABLET ORAL 2 TIMES DAILY WITH MEALS
Qty: 360 TAB | Refills: 1 | Status: SHIPPED | OUTPATIENT
Start: 2020-10-22 | End: 2021-04-25

## 2020-11-06 ENCOUNTER — VIRTUAL VISIT (OUTPATIENT)
Dept: SLEEP MEDICINE | Age: 60
End: 2020-11-06
Payer: MEDICAID

## 2020-11-06 DIAGNOSIS — E11.9 CONTROLLED TYPE 2 DIABETES MELLITUS WITHOUT COMPLICATION, WITHOUT LONG-TERM CURRENT USE OF INSULIN (HCC): ICD-10-CM

## 2020-11-06 DIAGNOSIS — G47.33 OBSTRUCTIVE SLEEP APNEA (ADULT) (PEDIATRIC): Primary | ICD-10-CM

## 2020-11-06 PROCEDURE — 99213 OFFICE O/P EST LOW 20 MIN: CPT | Performed by: NURSE PRACTITIONER

## 2020-11-06 NOTE — PATIENT INSTRUCTIONS
[address] [place]. [address] [facility], [address] Tel.  [phone] Fax. 100 [facility] 60 [place], [address] [facility] Tel.  [phone] Fax. [phone] [address] Tel.  [phone] Fax. [phone] Learning About CPAP for Sleep Apnea What is CPAP? CPAP is a small machine that you use at home every night while you sleep. It increases air pressure in your throat to keep your airway open. When you have sleep apnea, this can help you sleep better so you feel much better. CPAP stands for \"continuous positive airway pressure. \" The CPAP machine will have one of the following: · A mask that covers your nose and mouth · Prongs that fit into your nose · A mask that covers your nose only, the most common type. This type is called NCPAP. The N stands for \"nasal.\" Why is it done? CPAP is usually the best treatment for obstructive sleep apnea. It is the first treatment choice and the most widely used. Your doctor may suggest CPAP if you have: · Moderate to severe sleep apnea. · Sleep apnea and coronary artery disease (CAD) or heart failure. How does it help? · CPAP can help you have more normal sleep, so you feel less sleepy and more alert during the daytime. · CPAP may help keep heart failure or other heart problems from getting worse. · NCPAP may help lower your blood pressure. · If you use CPAP, your bed partner may also sleep better because you are not snoring or restless. What are the side effects? Some people who use CPAP have: · A dry or stuffy nose and a sore throat. · Irritated skin on the face. · Sore eyes. · Bloating. If you have any of these problems, work with your doctor to fix them. Here are some things you can try: · Be sure the mask or nasal prongs fit well. · See if your doctor can adjust the pressure of your CPAP. · If your nose is dry, try a humidifier. · If your nose is runny or stuffy, try decongestant medicine or a steroid nasal spray. If these things do not help, you might try a different type of machine. Some machines have air pressure that adjusts on its own. Others have air pressures that are different when you breathe in than when you breathe out. This may reduce discomfort caused by too much pressure in your nose. Where can you learn more? Go to amprice.be Enter Z679 in the search box to learn more about \"Learning About CPAP for Sleep Apnea. \"  
© 9022-6594 Healthwise, [employer]orporated. Care instructions adapted under license by [facility] (which disclaims liability or warranty for this information). This care instruction is for use with your licensed healthcare professional. If you have questions about a medical condition or this instruction, always ask your healthcare professional. [address] any warranty or liability for your use of this information. Content Version: 8.0.27022; Last Revised: January 11, 2010 PROPER SLEEP HYGIENE What to avoid · Do not have drinks with caffeine, such as coffee or black tea, for 8 hours before bed. · Do not smoke or use other types of tobacco near bedtime. Nicotine is a stimulant and can keep you awake. · Avoid drinking alcohol late in the evening, because it can cause you to wake in the middle of the night. · Do not eat a big meal close to bedtime. If you are hungry, eat a light snack. · Do not drink a lot of water close to bedtime, because the need to urinate may wake you up during the night. · Do not read or watch TV in bed. Use the bed only for sleeping and sexual activity. What to try · Go to bed at the same time every night, and wake up at the same time every morning. Do not take naps during the day. · Keep your bedroom quiet, dark, and cool. · Get regular exercise, but not within 3 to 4 hours of your bedtime. Parmjit Levy · Sleep on a comfortable pillow and mattress. · If watching the clock makes you anxious, turn it facing away from you so you cannot see the time. · If you worry when you lie down, start a worry book. Well before bedtime, write down your worries, and then set the book and your concerns aside. · Try meditation or other relaxation techniques before you go to bed. · If you cannot fall asleep, get up and go to another room until you feel sleepy. Do something relaxing. Repeat your bedtime routine before you go to bed again. · Make your house quiet and calm about an hour before bedtime. Turn down the lights, turn off the TV, log off the computer, and turn down the volume on music. This can help you relax after a busy day. Drowsy Driving: The [place] [address] cites drowsiness as a causing factor in more than 202,827 police reported crashes annually, resulting in 76,000 injuries and 1,500 deaths. Other surveys suggest 55% of people polled have driven while drowsy in the past year, 23% had fallen asleep but not crashed, 3% crashed, and 2% had and accident due to drowsy driving. Who is at risk? Young Drivers: One study of drowsy driving accidents states that 55% of the drivers were under 25 years. Of those, 75% were male. Shift Workers and Travelers: People who work overnight or travel across time zones frequently are at higher risk of experiencing Circadian Rhythm Disorders. They are trying to work and function when their body is programed to sleep. Sleep Deprived: Lack of sleep has a serious impact on your ability to pay attention or focus on a task. Consistently getting less than the average of 8 hours your body needs creates partial or cumulative sleep deprivation.   
Untreated Sleep Disorders: Sleep Apnea, Narcolepsy, R.L.S., and other sleep disorders (untreated) prevent a person from getting enough restful sleep. This leads to excessive daytime sleepiness and increases the risk for drowsy driving accidents by up to 7 times. Medications / Alcohol: Even over the counter medications can cause drowsiness. Medications that impair a drivers attention should have a warning label. Alcohol naturally makes you sleepy and on its own can cause accidents. Combined with excessive drowsiness its effects are amplified. Signs of Drowsy Driving: * You don't remember driving the last few miles * You may drift out of your sacha * You are unable to focus and your thoughts wander * You may yawn more often than normal 
 * You have difficulty keeping your eyes open / nodding off * Missing traffic signs, speeding, or tailgating Prevention-  
Good sleep hygiene, lifestyle and behavioral choices have the most impact on drowsy driving. There is no substitute for sleep and the average person requires 8 hours nightly. If you find yourself driving drowsy, stop and sleep. Consider the sleep hygiene tips provided during your visit as well. Medication Refill Policy: Refills for all medications require 1 week advance notice. Please have your pharmacy fax a refill request. We are unable to fax, or call in \"controled substance\" medications and you will need to pick these prescriptions up from our office. DEQ Activation Thank you for requesting access to DEQ. Please follow the instructions below to securely access and download your online medical record. DEQ allows you to send messages to your doctor, view your test results, renew your prescriptions, schedule appointments, and more. How Do I Sign Up? 1. In your internet browser, go to [email or web address]. 2. Click on the First Time User? Click Here link in the Sign In box. You will see the New Member Sign Up page. 3. Enter your DEQ Access Code exactly as it appears below.  You will not need to use this code after youve completed the sign-up process. If you do not sign up before the expiration date, you must request a new code. Viralheat Access Code: Activation code not generated Current Viralheat Status: Active (This is the date your Viralheat access code will ) 4. Enter the last four digits of your Social Security Number (xxxx) and Date of Birth (mm/dd/yyyy) as indicated and click Submit. You will be taken to the next sign-up page. 5. Create a Sendorit ID. This will be your Viralheat login ID and cannot be changed, so think of one that is secure and easy to remember. 6. Create a Viralheat password. You can change your password at any time. 7. Enter your Password Reset Question and Answer. This can be used at a later time if you forget your password. 8. Enter your e-mail address. You will receive e-mail notification when new information is available in [address] 9. Click Sign Up. You can now view and download portions of your medical record. 10. Click the Download Summary menu link to download a portable copy of your medical information. Additional Information If you have questions, please call [phone]. Remember, Viralheat is NOT to be used for urgent needs. For medical emergencies, dial 911.

## 2020-11-06 NOTE — PROGRESS NOTES
[address] [facility] [place]. [place], [address]   Tel.  [phone]   Fax. 100 [facility] 60   [place], [address] [facility]   Tel.  [phone]   Fax. [phone] [address]   Tel.  [phone]   Fax. [phone]       Subjective:   Duglas Bull is a 64 y.o. female who was seen by synchronous (real-time) audio-video technology on 11/6/2020. Consent:  She and/or her healthcare decision maker is aware that this patient-initiated Telehealth encounter is a billable service, with coverage as determined by her insurance carrier. She is aware that she may receive a bill and has provided verbal consent to proceed: Yes    I was at home while conducting this encounter. Patient verified with 's license in chart. Duglas Bull is seen for a positive airway pressure follow-up, last seen by Dr. Carol Magaña on 2/6/2020, prior notes reviewed in detail. In lab sleep test 8/2020 showed AHI of 178/hr with a lowest SaO2 of 78%, duration of SaO2 < 88% 4.6 min. She  is seen today for first adherence on APAP device set up 9/21/2020. She has lost 35 pounds since prior visit. She reports problems using the device due to sinus pain that keeps her awake. The following concerns identified:    Drowsiness no Problems exhaling no   Snoring no Forget to put on no   Mask Comfortable No, tight on head Can't fall asleep no   Dry Mouth yes Mask falls off no   Air Leaking no Frequent awakenings no       She admits that her sleep has improved on PAP therapy using full face mask and heated tubing. She has tried a number of masks but is still having issues with dryness. She has also been having headaches but thinks that is medication related.   She feels she is sleeping better with the device    Review of device download indicated:  Auto pressure: 5-12 cmH2O; Peak Avg Pressure: 8.9 cmH2O;  Avg. Device Pressure <= 90 %: 7.4 cmH2O     Average % Night in Large Leak:  0.4  % Used Days >= 4 hours: 47. Avg hours used:  3.5. We have discussed the benefits of PAP therapy and the related insurance requirements for use of a minimum of 4 hours at least 70% of the days in a 30 day period. She understands and will work to use device nightly. Therapy Apnea Index averaged over PAP use: 1.7 /hr which reflects improved sleep breathing condition. Union Sleepiness Score: 4 and Modified F.O.S.Q. Score Total / 2: 16.5 which reflects improved sleep quality over therapy time. Allergies   Allergen Reactions    Percocet [Oxycodone-Acetaminophen] Other (comments)     \"It makes me feel bad, real bad,like I have the flu. \"       She has a current medication list which includes the following prescription(s): metformin, naproxen, solifenacin, ciclopirox, ferrous sulfate, cholecalciferol, docusate sodium, multivitamin, omega 3-dha-epa-fish oil, budesonide-formoterol, montelukast, latanoprost, diclofenac ec, and nebulizer & compressor. .      She  has a past medical history of Adverse effect of anesthesia, Anemia, Anxiety, Glucose intolerance (impaired glucose tolerance), and Menopause (01/2018). Sleep Review of Systems: notable for Negative difficulty falling asleep; Positive awakenings at night; Negative early morning headaches; Negative memory problems; Negative concentration issues; Negative chest pain; Negative shortness of breath; Negative significant joint pain at night; Negative significant muscle pain at night; Negative rashes or itching; Negative heartburn; Negative significant mood issues; 0 afternoon naps per week    Objective:   Vitals reported by patient   Patient-Reported Vitals 11/6/2020   Patient-Reported Weight 213   Patient-Reported Systolic  926   Patient-Reported Diastolic 80        Calculated BMI 34    Physical Exam completed by visual and auditory observation of patient with verbal input from patient.     General:   Alert, oriented, not in acute distress Eyes:  Anicteric Sclerae; no obvious strabismus   Nose:  No obvious nasal septum deviation    Neck:   Midline trachea, no visible mass   Chest/Lungs:  Respiratory effort normal, no visualized signs of difficulty breathing or respiratory distress   CVS:  No JVD   Extremities:  No obvious rashes noted on face, neck, or hands   Neuro:  No facial asymmetry, no focal deficits; no obvious tremor    Psych:  Normal affect,  normal countenance       Assessment:       ICD-10-CM ICD-9-CM    1. Obstructive sleep apnea (adult) (pediatric)  G47.33 327.23    2. Controlled type 2 diabetes mellitus without complication, without long-term current use of insulin (HCC)  E11.9 250.00    3. Adult BMI 34.0-34.9 kg/sq m  Z68.34 V85.34        AHI = 17.8(8/2020). On APAP, Respironics :  5-12 cmH2O. She is not compliant with PAP therapy although when used PAP shows benefit to the patient and remains necessary for control of her sleep apnea. There is continued clinical benefit from the hours of use demonstrated by AHI reduced from 17.8/hr to 1.7/hr. She will work with new mask to reach 4 hours nightly. Plan:     Follow-up and Dispositions    · Return in about 31 days (around 12/7/2020) for compliance follow up. * Continue on current pressures, increased humidity and tube temperature from 3 to 4    * Counseling was provided regarding the importance of regular PAP use with emphasis on ensuring sufficient total sleep time, proper sleep hygiene, and safe driving. * Re-enforced proper and regular cleaning for the device. * She was asked to contact our office for any problems regarding PAP therapy. 2. Type II diabetes -  she continues on her current regimen. I have reviewed the relationship between sleep disordered breathing as it relates to diabetes.     3.  Encouraged continued weight management program through appropriate diet and exercise regimen as significant weight reduction has been shown to reduce severity of obstructive sleep apnea. She has lost 35 pounds over past 6 months    Patient's phone number [phone] (home)  was reviewed and confirmed for accuracy. She gives permission for messages regarding results and appointments to be left at that number. Due to this being a TeleHealth encounter (During [facility] public health emergency), evaluation of the following organ systems was limited: Vitals/Constitutional/EENT/Resp/CV/GI//MS/Neuro/Skin/Heme-Lymph-Imm. Pursuant to the emergency declaration under the [facility], [address] [place] [address] wa[facility] authority and the [employer] and [employer] Act, this Virtual Visit was conducted with patient's (and/or legal guardian's) consent, to reduce the risk of exposure to COVID-19 and provide necessary medical care. Services were provided through a video synchronous discussion virtually to substitute for in-person encounter. AMY UlloaBC, [address] [place] [address]  Electronically signed.  11/06/20

## 2020-12-07 ENCOUNTER — DOCUMENTATION ONLY (OUTPATIENT)
Dept: SLEEP MEDICINE | Age: 60
End: 2020-12-07

## 2020-12-07 ENCOUNTER — VIRTUAL VISIT (OUTPATIENT)
Dept: SLEEP MEDICINE | Age: 60
End: 2020-12-07
Payer: MEDICAID

## 2020-12-07 DIAGNOSIS — E11.9 CONTROLLED TYPE 2 DIABETES MELLITUS WITHOUT COMPLICATION, WITHOUT LONG-TERM CURRENT USE OF INSULIN (HCC): ICD-10-CM

## 2020-12-07 DIAGNOSIS — G47.33 OBSTRUCTIVE SLEEP APNEA (ADULT) (PEDIATRIC): Primary | ICD-10-CM

## 2020-12-07 PROCEDURE — 99213 OFFICE O/P EST LOW 20 MIN: CPT | Performed by: NURSE PRACTITIONER

## 2020-12-07 NOTE — PATIENT INSTRUCTIONS
[address] [facility] [place]. [place], [address]  Tel.  [phone]  Fax. 100 [facility] 60  [place], [address] [facility]  Tel.  [phone]  Fax. [phone] [address]  Tel.  [phone]  Fax. [phone]     Learning About CPAP for Sleep Apnea  What is CPAP? CPAP is a small machine that you use at home every night while you sleep. It increases air pressure in your throat to keep your airway open. When you have sleep apnea, this can help you sleep better so you feel much better. CPAP stands for \"continuous positive airway pressure. \"  The CPAP machine will have one of the following:  · A mask that covers your nose and mouth  · Prongs that fit into your nose  · A mask that covers your nose only, the most common type. This type is called NCPAP. The N stands for \"nasal.\"  Why is it done? CPAP is usually the best treatment for obstructive sleep apnea. It is the first treatment choice and the most widely used. Your doctor may suggest CPAP if you have:  · Moderate to severe sleep apnea. · Sleep apnea and coronary artery disease (CAD) or heart failure. How does it help? · CPAP can help you have more normal sleep, so you feel less sleepy and more alert during the daytime. · CPAP may help keep heart failure or other heart problems from getting worse. · NCPAP may help lower your blood pressure. · If you use CPAP, your bed partner may also sleep better because you are not snoring or restless. What are the side effects? Some people who use CPAP have:  · A dry or stuffy nose and a sore throat. · Irritated skin on the face. · Sore eyes. · Bloating. If you have any of these problems, work with your doctor to fix them. Here are some things you can try:  · Be sure the mask or nasal prongs fit well. · See if your doctor can adjust the pressure of your CPAP. · If your nose is dry, try a humidifier.   · If your nose is runny or stuffy, try decongestant medicine or a steroid nasal spray. If these things do not help, you might try a different type of machine. Some machines have air pressure that adjusts on its own. Others have air pressures that are different when you breathe in than when you breathe out. This may reduce discomfort caused by too much pressure in your nose. Where can you learn more? Go to [employer]  Enter Ami Nails in the search box to learn more about \"Learning About CPAP for Sleep Apnea. \"   © 5418-3298 Healthwise, Incorporated. Care instructions adapted under license by [employer] (which disclaims liability or warranty for this information). This care instruction is for use with your licensed healthcare professional. If you have questions about a medical condition or this instruction, always ask your healthcare professional. [address] any warranty or liability for your use of this information. Content Version: 4.2.94921; Last Revised: January 11, 2010  PROPER SLEEP HYGIENE    What to avoid  · Do not have drinks with caffeine, such as coffee or black tea, for 8 hours before bed. · Do not smoke or use other types of tobacco near bedtime. Nicotine is a stimulant and can keep you awake. · Avoid drinking alcohol late in the evening, because it can cause you to wake in the middle of the night. · Do not eat a big meal close to bedtime. If you are hungry, eat a light snack. · Do not drink a lot of water close to bedtime, because the need to urinate may wake you up during the night. · Do not read or watch TV in bed. Use the bed only for sleeping and sexual activity. What to try  · Go to bed at the same time every night, and wake up at the same time every morning. Do not take naps during the day. · Keep your bedroom quiet, dark, and cool. · Get regular exercise, but not within 3 to 4 hours of your bedtime. .  · Sleep on a comfortable pillow and mattress.   · If watching the clock makes you anxious, turn it facing away from you so you cannot see the time. · If you worry when you lie down, start a worry book. Well before bedtime, write down your worries, and then set the book and your concerns aside. · Try meditation or other relaxation techniques before you go to bed. · If you cannot fall asleep, get up and go to another room until you feel sleepy. Do something relaxing. Repeat your bedtime routine before you go to bed again. · Make your house quiet and calm about an hour before bedtime. Turn down the lights, turn off the TV, log off the computer, and turn down the volume on music. This can help you relax after a busy day. Drowsy Driving: The [employer] cites drowsiness as a causing factor in more than 644,408 police reported crashes annually, resulting in 76,000 injuries and 1,500 deaths. Other surveys suggest 55% of people polled have driven while drowsy in the past year, 23% had fallen asleep but not crashed, 3% crashed, and 2% had and accident due to drowsy driving. Who is at risk? Young Drivers: One study of drowsy driving accidents states that 55% of the drivers were under 25 years. Of those, 75% were male. Shift Workers and Travelers: People who work overnight or travel across time zones frequently are at higher risk of experiencing Circadian Rhythm Disorders. They are trying to work and function when their body is programed to sleep. Sleep Deprived: Lack of sleep has a serious impact on your ability to pay attention or focus on a task. Consistently getting less than the average of 8 hours your body needs creates partial or cumulative sleep deprivation. Untreated Sleep Disorders: Sleep Apnea, Narcolepsy, R.L.S., and other sleep disorders (untreated) prevent a person from getting enough restful sleep. This leads to excessive daytime sleepiness and increases the risk for drowsy driving accidents by up to 7 times.   Medications / Alcohol: Even over the counter medications can cause drowsiness. Medications that impair a drivers attention should have a warning label. Alcohol naturally makes you sleepy and on its own can cause accidents. Combined with excessive drowsiness its effects are amplified. Signs of Drowsy Driving:   * You don't remember driving the last few miles   * You may drift out of your sacha   * You are unable to focus and your thoughts wander   * You may yawn more often than normal   * You have difficulty keeping your eyes open / nodding off   * Missing traffic signs, speeding, or tailgating  Prevention-   Good sleep hygiene, lifestyle and behavioral choices have the most impact on drowsy driving. There is no substitute for sleep and the average person requires 8 hours nightly. If you find yourself driving drowsy, stop and sleep. Consider the sleep hygiene tips provided during your visit as well. Medication Refill Policy: Refills for all medications require 1 week advance notice. Please have your pharmacy fax a refill request. We are unable to fax, or call in \"controled substance\" medications and you will need to pick these prescriptions up from our office. [employer] Activation    Thank you for requesting access to [employer]. Please follow the instructions below to securely access and download your online medical record. [employer] allows you to send messages to your doctor, view your test results, renew your prescriptions, schedule appointments, and more. How Do I Sign Up? 1. In your internet browser, go to [email or web address]. 2. Click on the First Time User? Click Here link in the Sign In box. You will see the New Member Sign Up page. 3. Enter your Twist [employer] Shout Access Code exactly as it appears below. You will not need to use this code after youve completed the sign-up process. If you do not sign up before the expiration date, you must request a new code. [employer]ist [employer] Shout Access Code:  Activation code not generated  Current InDMusic Status: Active (This is the date your InDMusic access code will )    4. Enter the last four digits of your Social Security Number (xxxx) and Date of Birth (mm/dd/yyyy) as indicated and click Submit. You will be taken to the next sign-up page. 5. Create a [employer] ID. This will be your InDMusic login ID and cannot be changed, so think of one that is secure and easy to remember. 6. Create a InDMusic password. You can change your password at any time. 7. Enter your Password Reset Question and Answer. This can be used at a later time if you forget your password. 8. Enter your e-mail address. You will receive e-mail notification when new information is available in [address] 9. Click Sign Up. You can now view and download portions of your medical record. 10. Click the Download Summary menu link to download a portable copy of your medical information. Additional Information    If you have questions, please call [phone]. Remember, InDMusic is NOT to be used for urgent needs. For medical emergencies, dial 911.

## 2020-12-07 NOTE — PROGRESS NOTES
217 [facility] [place]. [place], [address]   Tel.  [phone]   Fax. 100 [facility] 60   [place], 200 [facility]   Tel.  [phone]   Fax. [phone] [address]    Tel.  [phone]   Fax. [phone]       Subjective:   Shaheen Fulton is a 64 y.o. female who was seen by synchronous (real-time) audio-video technology on 12/7/2020. Consent:  She and/or her healthcare decision maker is aware that this patient-initiated Telehealth encounter is a billable service, with coverage as determined by her insurance carrier. She is aware that she may receive a bill and has provided verbal consent to proceed: Yes    I was in the office while conducting this encounter. Patient verified with 's license. Shaheen Fulton is seen for a positive airway pressure follow-up, last seen by me on 11/6/2020, previously seen by Dr. Gab Aparicio on 2/6/2020, prior notes reviewed in detail. In lab sleep test 8/2020 showed AHI of 178/hr with a lowest SaO2 of 78%, duration of SaO2 < 88% 4.6 min. She  is seen today for follow up on device set up 9/21/2020. She reports no problems using the device. The following concerns identified:    Drowsiness no Problems exhaling no   Snoring no Forget to put on no   Mask Comfortable yes Can't fall asleep no   Dry Mouth no Mask falls off no   Air Leaking no Frequent awakenings no       She admits that her sleep has improved on PAP therapy using full face mask and heated tubing. Review of device download indicated:  Auto pressure: 5-12 cmH2O; Peak Avg Pressure: 9.0 cmH2O;  Avg. Device Pressure <= 90 %: 7.8 cmH2O      Average % Night in Large Leak: 1.2  % Used Days >= 4 hours: 77.  Avg hours used:  6:17. Therapy Apnea Index averaged over PAP use: 1.6 /hr which reflects improved sleep breathing condition.     [place] Sleepiness Score: 5 and Modified F.O.S.Q. Score Total / 2: 18 which reflects improved sleep quality over therapy time. Allergies   Allergen Reactions    Percocet [Oxycodone-Acetaminophen] Other (comments)     \"It makes me feel bad, real bad,like I have the flu. \"       She has a current medication list which includes the following prescription(s): naproxen, metformin, solifenacin, ciclopirox, ferrous sulfate, cholecalciferol, docusate sodium, nebulizer & compressor, multivitamin, omega 3-dha-epa-fish oil, budesonide-formoterol, montelukast, latanoprost, and diclofenac ec. Wilmer Gutierrez She  has a past medical history of Adverse effect of anesthesia, Anemia, Anxiety, Glucose intolerance (impaired glucose tolerance), and Menopause (01/2018). Sleep Review of Systems: notable for Negative difficulty falling asleep; Positive awakenings at night; Negative early morning headaches; Negative memory problems; Negative concentration issues; Negative chest pain; Negative shortness of breath; Negative significant joint pain at night; Negative significant muscle pain at night; Negative rashes or itching; Negative heartburn; Negative significant mood issues; 0 afternoon naps per week    Objective:   Vitals reported by patient     Patient-Reported Vitals 12/7/2020   Patient-Reported Weight 220   Patient-Reported Systolic  683   Patient-Reported Diastolic 70      Calculated BMI 35    Physical Exam completed by visual and auditory observation of patient with verbal input from patient. General:   Alert, oriented, not in acute distress   Eyes:  Anicteric Sclerae; no obvious strabismus   Nose:  No obvious nasal septum deviation    Neck:   Midline trachea, no visible mass   Chest/Lungs:  Respiratory effort normal, no visualized signs of difficulty breathing or respiratory distress   CVS:  No JVD   Extremities:  No obvious rashes noted on face, neck, or hands   Neuro:  No facial asymmetry, no focal deficits; no obvious tremor    Psych:  Normal affect,  normal countenance       Assessment:       ICD-10-CM ICD-9-CM    1. Obstructive sleep apnea (adult) (pediatric)  G47.33 327.23 AMB SUPPLY ORDER   2. Controlled type 2 diabetes mellitus without complication, without long-term current use of insulin ([facility])  E11.9 250.00    3. Adult BMI 35.0-35.9 kg/sq m  Z68.35 V85.35        AHI = 17.8(8/2020). On APAP, Respironics :  5-12 cmH2O. She is adherent with PAP therapy and PAP continues to benefit patient and remains necessary for control of her sleep apnea. Plan:     Follow-up and Dispositions    · Return in about 1 year (around 12/7/2021). *   Continue on current pressures    * Supplies ordered - full face mask and heated tubing    Orders Placed This Encounter    AMB SUPPLY ORDER     Diagnosis: (G47.33) MING (obstructive sleep apnea)  (primary encounter diagnosis)     Replacement Supplies for Positive Airway Pressure Therapy Device:   Duration of need: 99 months.  Full Face Mask 1 every 3 months.  Full Face Mask Cushion 1 per month.  Headgear 1 every 6 months.  Tubing with heating element 1 every 3 months.  Filter(s) Disposable 2 per month.  Filter(s) Non-Disposable 1 every 6 months. .   [facility] for Humidifier (Replace) 1 every 6 months. AMY PoolBC; NPI: [id number]    Electronically signed. Date:- 12/07/20     * Counseling was provided regarding the importance of regular PAP use with emphasis on ensuring sufficient total sleep time, proper sleep hygiene, and safe driving. * Re-enforced proper and regular cleaning for the device. * She was asked to contact our office for any problems regarding PAP therapy. 2.Type II diabetes -  she continues on her current regimen. I have reviewed the relationship between sleep disordered breathing as it relates to diabetes.     3. Encouraged continued weight management  program through appropriate diet and exercise regimen as significant weight reduction has been shown to reduce severity of obstructive sleep apnea. Patient's phone number [phone] (home)  was reviewed and confirmed for accuracy. She gives permission for messages regarding results and appointments to be left at that number. Due to this being a TeleHealth encounter (During USETD-66 public health emergency), evaluation of the following organ systems was limited: Vitals/Constitutional/EENT/Resp/CV/GI//MS/Neuro/Skin/Heme-Lymph-Imm. Pursuant to the emergency declaration under the [address] [place] [address], [address] [place] [address] authority and the [employer] and [employer] Act, this Virtual Visit was conducted with patient's (and/or legal guardian's) consent, to reduce the risk of exposure to COVID-19 and provide necessary medical care. Services were provided through a video synchronous discussion virtually to substitute for in-person encounter. PANCHITO Galarza, [address] [place] [address]  Electronically signed.  12/07/20

## 2020-12-15 ENCOUNTER — HOSPITAL ENCOUNTER (OUTPATIENT)
Dept: GENERAL RADIOLOGY | Age: 60
Discharge: HOME | End: 2020-12-15
Payer: MEDICAID

## 2020-12-15 ENCOUNTER — OFFICE VISIT (OUTPATIENT)
Dept: FAMILY MEDICINE CLINIC | Age: 60
End: 2020-12-15
Payer: MEDICAID

## 2020-12-15 VITALS
DIASTOLIC BLOOD PRESSURE: 80 MMHG | SYSTOLIC BLOOD PRESSURE: 135 MMHG | HEIGHT: 66 IN | WEIGHT: 232.2 LBS | HEART RATE: 63 BPM | BODY MASS INDEX: 37.32 KG/M2 | TEMPERATURE: 98 F | OXYGEN SATURATION: 98 % | RESPIRATION RATE: 16 BRPM

## 2020-12-15 DIAGNOSIS — M79.641 RIGHT HAND PAIN: ICD-10-CM

## 2020-12-15 DIAGNOSIS — E11.9 CONTROLLED TYPE 2 DIABETES MELLITUS WITHOUT COMPLICATION, WITHOUT LONG-TERM CURRENT USE OF INSULIN (HCC): Primary | ICD-10-CM

## 2020-12-15 PROCEDURE — 73110 X-RAY EXAM OF WRIST: CPT

## 2020-12-15 PROCEDURE — 99213 OFFICE O/P EST LOW 20 MIN: CPT | Performed by: FAMILY MEDICINE

## 2020-12-15 PROCEDURE — 73130 X-RAY EXAM OF HAND: CPT

## 2020-12-15 RX ORDER — DICLOFENAC SODIUM 50 MG/1
50 TABLET, DELAYED RELEASE ORAL 2 TIMES DAILY
Qty: 60 TAB | Refills: 5 | Status: SHIPPED | OUTPATIENT
Start: 2020-12-15

## 2020-12-15 NOTE — PROGRESS NOTES
Ming Falcon is a 64 y.o. female who presents today with the following:    HPI  Chief Complaint   Patient presents with    Hand Pain     Right Hand injury and pain      Was carrying her laundry basket and went forward on porch toward her porch collumns       1. Controlled type 2 diabetes mellitus without complication, without long-term current use of insulin ([address]  Patient currently taking Metformin 1000 mg twice a day. She was not taking any medications at the time of last lab work in [place] [address]. She reports she restarted taking vitamin D, iron, Metformin and fish oil after her lab work was done. We will do next lab draw in January 2021. She reports GI side effects with Metformin. Diabetic Foot and Eye Exam HM Status   Topic Date Due    Diabetic Foot Care  01/04/1970    Eye Exam  01/04/1970     There is no immunization history for the selected administration types on file for this patient. Lab Results   Component Value Date/Time    Microalbumin/Creat ratio (mg/g creat) 14 10/06/2020 10:52 AM    Microalbumin,urine random 1.71 10/06/2020 10:52 AM     Lab Results   Component Value Date/Time    Hemoglobin A1c 11.0 (H) 10/06/2020 10:52 AM    Hemoglobin A1c (POC) 5.9 (A) 10/10/2018 04:04 PM           2. Right hand pain  Reports pain in right hand and right wrist.  This started 2 weeks ago. Patient had injury while carrying laundry basket and after that has been using Naprosyn as needed for pain. Also using wrist splint and ice packs without relief. Noticed wrist swelling. Has decreased range of motion of right wrist.         Review of Systems   Constitutional: Negative. HENT: Negative. Eyes: Negative. Respiratory: Negative. Cardiovascular: Negative. Gastrointestinal: Negative. Genitourinary: Negative. Musculoskeletal: Positive for joint pain. Skin: Negative. Neurological: Negative. Endo/Heme/Allergies: Negative. Psychiatric/Behavioral: Negative.         Physical Exam  Vitals signs and nursing note reviewed. HENT:      Head: Normocephalic and atraumatic. Right Ear: External ear normal.      Left Ear: External ear normal.      Nose: Nose normal.   Eyes:      Conjunctiva/sclera: Conjunctivae normal.   Neck:      Musculoskeletal: Normal range of motion and neck supple. Thyroid: No thyromegaly. Cardiovascular:      Rate and Rhythm: Normal rate and regular rhythm. Pulmonary:      Effort: Pulmonary effort is normal.      Breath sounds: Normal breath sounds. Abdominal:      General: Bowel sounds are normal. There is no distension. Palpations: Abdomen is soft. Tenderness: There is no abdominal tenderness. Musculoskeletal:      Right wrist: She exhibits decreased range of motion, tenderness and swelling. Lymphadenopathy:      Cervical: No cervical adenopathy. Skin:     General: Skin is warm and dry. Neurological:      Mental Status: She is alert and oriented to person, place, and time. Psychiatric:         Mood and Affect: Mood and affect normal.       /80   Pulse 63   Temp 98 °F (36.7 °C) (Oral)   Resp 16   Ht 5' 6\" (1.676 m)   Wt 232 lb 3.2 oz (105.3 kg)   SpO2 98%   BMI 37.48 kg/m²     Allergies   Allergen Reactions    Metformin Nausea Only    Percocet [Oxycodone-Acetaminophen] Other (comments)     \"It makes me feel bad, real bad,like I have the flu. \"       Current Outpatient Medications   Medication Sig    diclofenac EC (VOLTAREN) 50 mg EC tablet Take 1 Tab by mouth two (2) times a day.  metFORMIN (GLUCOPHAGE) 500 mg tablet TAKE 2 TABS BY MOUTH TWO (2) TIMES DAILY (WITH MEALS). (Patient taking differently: Take 1,000 mg by mouth two (2) times daily (with meals). SAYS METFORMIN CAUSES NAUSEA)    solifenacin (VESICARE) 5 mg tablet     ciclopirox (PENLAC) 8 % solution APPLY TOPICALLY ONCE DAILY    ferrous sulfate (Iron) 325 mg (65 mg iron) tablet Take  by mouth Daily (before breakfast).     cholecalciferol (Vitamin D3) (1000 Units /25 mcg) tablet Take  by mouth daily.  docusate sodium (Stool Softener) 100 mg capsule Take 100 mg by mouth two (2) times a day.  multivitamin (DAILY VITAMINS PO) Take  by mouth.  omega 3-DHA-EPA-fish oil (FISH OIL) 1,000 mg (120 mg-180 mg) capsule Take 1 Cap by mouth daily. Indications: high amount of triglyceride in the blood    budesonide-formoterol (SYMBICORT) 80-4.5 mcg/actuation HFAA Take 2 Puffs by inhalation two (2) times a day.  montelukast (SINGULAIR) 10 mg tablet Take 1 Tab by mouth daily.  latanoprost (XALATAN) 0.005 % ophthalmic solution Administer 1 Drop to both eyes nightly.  Nebulizer & Compressor machine Use every 6 hours as needed for shortness of breath     No current facility-administered medications for this visit.         Past Medical History:   Diagnosis Date    Adverse effect of anesthesia     WITHIN 20-30 MINUTES AFTER WAKING UP FROM CARPAL TUNNEL SURGERY, HAD DIFFICULTY BREATHING ,     Anemia     Anxiety     Glucose intolerance (impaired glucose tolerance)     Menopause 2018       Past Surgical History:   Procedure Laterality Date    HX CARPAL TUNNEL RELEASE Bilateral     HX  SECTION      HX HYSTERECTOMY  2018    HX LAP CHOLECYSTECTOMY      HX OOPHORECTOMY Bilateral 2018    HX ORTHOPAEDIC Left 2015    SPUR REMOVED TO LEFT FOOT    HX ORTHOPAEDIC Left     SPIN AND SCREW IN LEFT ARM       Problem List  Date Reviewed: 12/15/2020          Codes Class Noted    Stridor ICD-10-CM: R06.1  ICD-9-CM: 786.1  2018        Severe obesity ([facility] ICD-10-CM: E66.01  ICD-9-CM: 278.01  10/10/2018        SOB (shortness of breath) ICD-10-CM: R06.02  ICD-9-CM: 786.05  10/10/2018        Pain of right hip joint ICD-10-CM: M25.551  ICD-9-CM: 719.45  10/10/2018        Obstructive sleep apnea syndrome ICD-10-CM: G47.33  ICD-9-CM: 327.23  2018        Snoring ICD-10-CM: R06.83  ICD-9-CM: 786.09  2017        Glucose intolerance (impaired glucose tolerance) ICD-10-CM: R73.02  ICD-9-CM: 790.22  Unknown        Anemia ICD-10-CM: D64.9  ICD-9-CM: 047. 9  Unknown               No results found for this visit on 12/15/20. 1. Controlled type 2 diabetes mellitus without complication, without long-term current use of insulin ([facility]    - REFERRAL TO DIABETIC EDUCATION; Standing    2. Right hand pain    - diclofenac EC (VOLTAREN) 50 mg EC tablet; Take 1 Tab by mouth two (2) times a day. Dispense: 60 Tab; Refill: 5  - XR WRIST RT AP/LAT; Future  - XR HAND RT MIN 3 V; Future  - REFERRAL TO PHYSICAL THERAPY        Follow-up and Dispositions    · Return if symptoms worsen or fail to improve. I ADVISED PATIENT TO GO TO ER IF SYMPTOMS WORSEN , CHANGE OR FAILS TO IMPROVE. I have discussed the diagnosis with the patient and the intended plan as seen in the above orders. The patient has received an after-visit summary and questions were answered concerning future plans. I have discussed medication side effects and warnings with the patient as well. The patient agrees and understands above plan.            Sherrell Pinto MD

## 2020-12-18 ENCOUNTER — TELEPHONE (OUTPATIENT)
Dept: FAMILY MEDICINE CLINIC | Age: 60
End: 2020-12-18

## 2020-12-18 NOTE — TELEPHONE ENCOUNTER
Please advise       ----- Message from Farhad Ferguson sent at 12/18/2020  9:36 AM EST -----  Regarding: Dr. Gulshan Sandhu (if not patient): N/A  Relationship of caller (if not patient): N/A  Best contact number(s): [phone]  Name of medication and dosage if known: Need a face mask & tubing for nebulizer machine  Is patient out of this medication (yes/no): Yes  Pharmacy name: [facility]  Pharmacy listed in chart? (yes/no): Yes  Pharmacy phone number: [phone]  Date of last visit: 12/15/20  Details to clarify the request: Pt advised she just has a machine with not tubing & mask.

## 2020-12-21 DIAGNOSIS — R06.02 SOB (SHORTNESS OF BREATH): Primary | ICD-10-CM

## 2021-03-29 ENCOUNTER — PATIENT MESSAGE (OUTPATIENT)
Dept: FAMILY MEDICINE CLINIC | Age: 61
End: 2021-03-29

## 2021-04-21 ENCOUNTER — PATIENT MESSAGE (OUTPATIENT)
Dept: FAMILY MEDICINE CLINIC | Age: 61
End: 2021-04-21

## 2021-04-23 DIAGNOSIS — E11.9 CONTROLLED TYPE 2 DIABETES MELLITUS WITHOUT COMPLICATION, WITHOUT LONG-TERM CURRENT USE OF INSULIN (HCC): ICD-10-CM

## 2021-04-25 RX ORDER — METFORMIN HYDROCHLORIDE 500 MG/1
1000 TABLET ORAL 2 TIMES DAILY WITH MEALS
Qty: 360 TAB | Refills: 1 | Status: SHIPPED | OUTPATIENT
Start: 2021-04-25 | End: 2022-03-14

## 2021-04-25 NOTE — TELEPHONE ENCOUNTER
PLEASE SCHEDULE IN PERSON OFFICE VISIT OR TELEVISIT WITH ME FOR FURTHER REFILLS , THANKS  Due for labs

## 2021-05-20 ENCOUNTER — OFFICE VISIT (OUTPATIENT)
Dept: FAMILY MEDICINE CLINIC | Age: 61
End: 2021-05-20
Payer: MEDICAID

## 2021-05-20 VITALS
SYSTOLIC BLOOD PRESSURE: 131 MMHG | DIASTOLIC BLOOD PRESSURE: 82 MMHG | BODY MASS INDEX: 38.57 KG/M2 | RESPIRATION RATE: 20 BRPM | HEIGHT: 66 IN | OXYGEN SATURATION: 99 % | HEART RATE: 77 BPM | TEMPERATURE: 97.2 F | WEIGHT: 240 LBS

## 2021-05-20 DIAGNOSIS — E78.5 HYPERLIPIDEMIA, UNSPECIFIED HYPERLIPIDEMIA TYPE: ICD-10-CM

## 2021-05-20 DIAGNOSIS — Z12.31 ENCOUNTER FOR SCREENING MAMMOGRAM FOR MALIGNANT NEOPLASM OF BREAST: ICD-10-CM

## 2021-05-20 DIAGNOSIS — E61.1 IRON DEFICIENCY: ICD-10-CM

## 2021-05-20 DIAGNOSIS — E11.9 CONTROLLED TYPE 2 DIABETES MELLITUS WITHOUT COMPLICATION, WITHOUT LONG-TERM CURRENT USE OF INSULIN (HCC): Primary | ICD-10-CM

## 2021-05-20 PROCEDURE — 99214 OFFICE O/P EST MOD 30 MIN: CPT | Performed by: FAMILY MEDICINE

## 2021-05-20 RX ORDER — BISACODYL 5 MG
5 TABLET, DELAYED RELEASE (ENTERIC COATED) ORAL
COMMUNITY

## 2021-05-20 NOTE — PROGRESS NOTES
Martha Xie is a 63 y.o. female , established patient, here for evaluation of the following chief complaint(s):    HPI  Chief Complaint   Patient presents with    Complete Physical     No PAP       1. Controlled type 2 diabetes mellitus without complication, without long-term current use of insulin ([facility] [address]  Diabetes  Patient presents today for diabetes follow up. Pt. current diabetic medications include Metformin. Taking medication as prescribed. Current monitoring regimen: home blood tests - daily. Home blood sugar records: fasting range: 140s. Any episodes of hypoglycemia? no. Known diabetic complications: none. Cardiovascular risk factors: dyslipidemia, diabetes mellitus. Not currently on ASA, not statin, not ACE/ARB. Diabetic Foot and Eye Exam HM Status   Topic Date Due    Diabetic Foot Care  Never done    Eye Exam  Never done     There is no immunization history for the selected administration types on file for this patient. Lab Results   Component Value Date/Time    Microalbumin/Creat ratio (mg/g creat) 14 10/06/2020 10:52 AM    Microalbumin,urine random 1.71 10/06/2020 10:52 AM     Lab Results   Component Value Date/Time    Hemoglobin A1c 11.0 (H) 10/06/2020 10:52 AM    Hemoglobin A1c (POC) 5.9 (A) 10/10/2018 04:04 PM           2. Hyperlipidemia, unspecified hyperlipidemia type    Patient presents today for hyperlipidemia. Patient currently not taking any cholesterol medication    Lab Results   Component Value Date/Time    Cholesterol, total 220 (H) 10/06/2020 10:52 AM    Cholesterol (POC) 173 05/31/2017 02:21 PM    HDL Cholesterol 60 10/06/2020 10:52 AM    HDL Cholesterol (POC) 66 05/31/2017 02:21 PM    LDL Cholesterol (POC) 85 05/31/2017 02:21 PM    LDL, calculated 124.8 (H) 10/06/2020 10:52 AM    VLDL, calculated 35.2 10/06/2020 10:52 AM    Triglyceride 176 (H) 10/06/2020 10:52 AM    Triglycerides (POC) 113 05/31/2017 02:21 PM    CHOL/HDL Ratio 3.7 10/06/2020 10:52 AM       3.  Iron deficiency  Takes iron supplement. 4. Encounter for screening mammogram for malignant neoplasm of breast  Not up-to-date on mammogram      Review of Systems   Constitutional: Negative. HENT: Negative. Eyes: Negative. Respiratory: Negative. Cardiovascular: Negative. Gastrointestinal: Negative. Genitourinary: Negative. Musculoskeletal: Negative. Skin: Negative. Neurological: Negative. Endo/Heme/Allergies: Negative. Psychiatric/Behavioral: Negative. Physical Exam  Vitals and nursing note reviewed. HENT:      Head: Normocephalic and atraumatic. Right Ear: External ear normal.      Left Ear: External ear normal.      Nose: Nose normal.   Eyes:      Conjunctiva/sclera: Conjunctivae normal.   Neck:      Thyroid: No thyromegaly. Cardiovascular:      Rate and Rhythm: Normal rate and regular rhythm. Pulmonary:      Effort: Pulmonary effort is normal.      Breath sounds: Normal breath sounds. Abdominal:      General: Bowel sounds are normal. There is no distension. Palpations: Abdomen is soft. Tenderness: There is no abdominal tenderness. Musculoskeletal:         General: Normal range of motion. Cervical back: Normal range of motion and neck supple. Lymphadenopathy:      Cervical: No cervical adenopathy. Skin:     General: Skin is warm and dry. Neurological:      Mental Status: She is alert and oriented to person, place, and time. Psychiatric:         Mood and Affect: Mood and affect normal.       /82 (BP 1 Location: Right arm, BP Patient Position: Sitting, BP Cuff Size: Adult)   Pulse 77   Temp 97.2 °F (36.2 °C) (Temporal)   Resp 20   Ht 5' 6\" (1.676 m)   Wt 240 lb (108.9 kg)   SpO2 99%   BMI 38.74 kg/m²     Allergies   Allergen Reactions    Metformin Nausea Only    Percocet [Oxycodone-Acetaminophen] Other (comments)     \"It makes me feel bad, real bad,like I have the flu. \"       Current Outpatient Medications   Medication Sig    bisacodyL (DULCOLAX) 5 mg EC tablet Take 5 mg by mouth. As needed    metFORMIN (GLUCOPHAGE) 500 mg tablet TAKE 2 TABS BY MOUTH TWO (2) TIMES DAILY (WITH MEALS).  diclofenac EC (VOLTAREN) 50 mg EC tablet Take 1 Tab by mouth two (2) times a day.  solifenacin (VESICARE) 5 mg tablet     ciclopirox (PENLAC) 8 % solution APPLY TOPICALLY ONCE DAILY    ferrous sulfate (Iron) 325 mg (65 mg iron) tablet Take  by mouth Daily (before breakfast).  cholecalciferol (Vitamin D3) (1000 Units /25 mcg) tablet Take  by mouth daily.  docusate sodium (Stool Softener) 100 mg capsule Take 100 mg by mouth two (2) times a day.  multivitamin (DAILY VITAMINS PO) Take  by mouth.  omega 3-DHA-EPA-fish oil (FISH OIL) 1,000 mg (120 mg-180 mg) capsule Take 1 Cap by mouth daily. Indications: high amount of triglyceride in the blood    budesonide-formoterol (SYMBICORT) 80-4.5 mcg/actuation HFAA Take 2 Puffs by inhalation two (2) times a day.  montelukast (SINGULAIR) 10 mg tablet Take 1 Tab by mouth daily.  latanoprost (XALATAN) 0.005 % ophthalmic solution Administer 1 Drop to both eyes nightly.  Nebulizer Accessories kit Use face mask & tubing for nebulizer machine    Nebulizer & Compressor machine Use every 6 hours as needed for shortness of breath     No current facility-administered medications for this visit.        Past Medical History:   Diagnosis Date    Adverse effect of anesthesia     WITHIN 20-30 MINUTES AFTER WAKING UP FROM CARPAL TUNNEL SURGERY, HAD DIFFICULTY BREATHING ,     Anemia     Anxiety     Glucose intolerance (impaired glucose tolerance)     Menopause 2018       Past Surgical History:   Procedure Laterality Date    HX CARPAL TUNNEL RELEASE Bilateral     HX  SECTION      HX HYSTERECTOMY  2018    HX LAP CHOLECYSTECTOMY      HX OOPHORECTOMY Bilateral 2018    HX ORTHOPAEDIC Left 2015    SPUR REMOVED TO LEFT FOOT    HX ORTHOPAEDIC Left     SPIN AND SCREW IN LEFT ARM Problem List  Date Reviewed: 12/15/2020        Codes Class Noted    Stridor ICD-10-CM: R06.1  ICD-9-CM: 786.1  11/28/2018        Severe obesity ([facility] ICD-10-CM: E66.01  ICD-9-CM: 278.01  10/10/2018        SOB (shortness of breath) ICD-10-CM: R06.02  ICD-9-CM: 786.05  10/10/2018        Pain of right hip joint ICD-10-CM: M25.551  ICD-9-CM: 719.45  10/10/2018        Obstructive sleep apnea syndrome ICD-10-CM: G47.33  ICD-9-CM: 327.23  2/2/2018        Snoring ICD-10-CM: R06.83  ICD-9-CM: 786.09  11/29/2017        Glucose intolerance (impaired glucose tolerance) ICD-10-CM: R73.02  ICD-9-CM: 790.22  Unknown        Anemia ICD-10-CM: D64.9  ICD-9-CM: 285. 9  Unknown               No results found for this visit on 05/20/21. 1. Controlled type 2 diabetes mellitus without complication, without long-term current use of insulin ([facility]    - THYROID CASCADE PROFILE; Future  - HEMOGLOBIN A1C WITH EAG; Future  - URINALYSIS W/ REFLEX CULTURE; Future  - MICROALBUMIN, UR, RAND W/ MICROALB/CREAT RATIO; Future  - MICROALBUMIN, UR, RAND W/ MICROALB/CREAT RATIO  - URINALYSIS W/ REFLEX CULTURE  - HEMOGLOBIN A1C WITH EAG  - THYROID CASCADE PROFILE    2. Hyperlipidemia, unspecified hyperlipidemia type    - THYROID CASCADE PROFILE; Future  - METABOLIC PANEL, COMPREHENSIVE; Future  - LIPID PANEL; Future  - LIPID PANEL  - METABOLIC PANEL, COMPREHENSIVE  - THYROID CASCADE PROFILE    3. Iron deficiency    - CBC WITH AUTOMATED DIFF; Future  - CBC WITH AUTOMATED DIFF    4. Encounter for screening mammogram for malignant neoplasm of breast    - [facility] MAMMO BI SCREENING INCL CAD; Future        Follow-up and Dispositions    · Return in about 6 months (around 11/20/2021) for follow up. I ADVISED PATIENT TO GO TO ER IF SYMPTOMS WORSEN , CHANGE OR FAILS TO IMPROVE. I have discussed the diagnosis with the patient and the intended plan as seen in the above orders.   The patient has received an after-visit summary and questions were answered concerning future plans. I have discussed medication side effects and warnings with the patient as well. The patient agrees and understands above plan.            Ranjit Hui MD

## 2021-05-20 NOTE — PROGRESS NOTES
Patient stated name &   Chief Complaint   Patient presents with    Complete Physical     No PAP        Health Maintenance Due   Topic    Pneumococcal 0-64 years (1 of 2 - PPSV23)    Foot Exam Q1     Eye Exam Retinal or Dilated     Shingrix Vaccine Age 50> (1 of 2)    A1C test (Diabetic or Prediabetic)        Wt Readings from Last 3 Encounters:   21 240 lb (108.9 kg)   12/15/20 232 lb 3.2 oz (105.3 kg)   10/06/20 223 lb (101.2 kg)     Temp Readings from Last 3 Encounters:   21 97.2 °F (36.2 °C) (Temporal)   12/15/20 98 °F (36.7 °C) (Oral)   10/06/20 98.1 °F (36.7 °C) (Oral)     BP Readings from Last 3 Encounters:   21 131/82   12/15/20 135/80   10/06/20 120/81     Pulse Readings from Last 3 Encounters:   21 77   12/15/20 63   10/06/20 86         Learning Assessment:  :     Learning Assessment 2020 3/31/2020   PRIMARY LEARNER Patient Patient   [place] No   PRIMARY LANGUAGE ENGLISH ENGLISH   LEARNER PREFERENCE PRIMARY DEMONSTRATION DEMONSTRATION   ANSWERED BY patient  patient    RELATIONSHIP SELF SELF       Depression Screening:  :     3 most recent PHQ Screens 2021   Little interest or pleasure in doing things Not at all   Feeling down, depressed, irritable, or hopeless Not at all   Total Score PHQ 2 0       Fall Risk Assessment:  :     No flowsheet data found. Abuse Screening:  :     No flowsheet data found. Coordination of Care Questionnaire:  :     1) Have you been to an emergency room, urgent care clinic since your last visit? No  Hospitalized since your last visit? No             2) Have you seen or consulted any other health care providers outside of [address] [place] [address] since your last visit? No  (Include any pap smears or colon screenings in this section.)    Patient is accompanied by self I have received verbal consent from Sukhjinder Guan to discuss any/all medical information while they are present in the room.

## 2021-05-21 LAB
ALBUMIN SERPL-MCNC: 4.1 G/DL (ref 3.5–5)
ALBUMIN/GLOB SERPL: 1.2 {RATIO} (ref 1.1–2.2)
ALP SERPL-CCNC: 119 U/L (ref 45–117)
ALT SERPL-CCNC: 75 U/L (ref 12–78)
ANION GAP SERPL CALC-SCNC: 5 MMOL/L (ref 5–15)
APPEARANCE UR: ABNORMAL
AST SERPL-CCNC: 38 U/L (ref 15–37)
BACTERIA URNS QL MICRO: ABNORMAL /HPF
BASOPHILS # BLD: 0.1 K/UL (ref 0–0.1)
BASOPHILS NFR BLD: 1 % (ref 0–1)
BILIRUB SERPL-MCNC: 0.6 MG/DL (ref 0.2–1)
BILIRUB UR QL: NEGATIVE
BUN SERPL-MCNC: 15 MG/DL (ref 6–20)
BUN/CREAT SERPL: 20 (ref 12–20)
CALCIUM SERPL-MCNC: 10 MG/DL (ref 8.5–10.1)
CHLORIDE SERPL-SCNC: 104 MMOL/L (ref 97–108)
CHOLEST SERPL-MCNC: 212 MG/DL
CO2 SERPL-SCNC: 28 MMOL/L (ref 21–32)
COLOR UR: ABNORMAL
CREAT SERPL-MCNC: 0.75 MG/DL (ref 0.55–1.02)
CREAT UR-MCNC: 157 MG/DL
DIFFERENTIAL METHOD BLD: ABNORMAL
EOSINOPHIL # BLD: 0.4 K/UL (ref 0–0.4)
EOSINOPHIL NFR BLD: 5 % (ref 0–7)
EPITH CASTS URNS QL MICRO: ABNORMAL /LPF
ERYTHROCYTE [DISTWIDTH] IN BLOOD BY AUTOMATED COUNT: 11.9 % (ref 11.5–14.5)
EST. AVERAGE GLUCOSE BLD GHB EST-MCNC: 160 MG/DL
GLOBULIN SER CALC-MCNC: 3.5 G/DL (ref 2–4)
GLUCOSE SERPL-MCNC: 164 MG/DL (ref 65–100)
GLUCOSE UR STRIP.AUTO-MCNC: NEGATIVE MG/DL
HBA1C MFR BLD: 7.2 % (ref 4–5.6)
HCT VFR BLD AUTO: 40.1 % (ref 35–47)
HDLC SERPL-MCNC: 70 MG/DL
HDLC SERPL: 3 {RATIO} (ref 0–5)
HGB BLD-MCNC: 12 G/DL (ref 11.5–16)
HGB UR QL STRIP: NEGATIVE
IMM GRANULOCYTES # BLD AUTO: 0 K/UL (ref 0–0.04)
IMM GRANULOCYTES NFR BLD AUTO: 0 % (ref 0–0.5)
KETONES UR QL STRIP.AUTO: NEGATIVE MG/DL
LDLC SERPL CALC-MCNC: 108.6 MG/DL (ref 0–100)
LEUKOCYTE ESTERASE UR QL STRIP.AUTO: NEGATIVE
LYMPHOCYTES # BLD: 2 K/UL (ref 0.8–3.5)
LYMPHOCYTES NFR BLD: 25 % (ref 12–49)
MCH RBC QN AUTO: 27.5 PG (ref 26–34)
MCHC RBC AUTO-ENTMCNC: 29.9 G/DL (ref 30–36.5)
MCV RBC AUTO: 91.8 FL (ref 80–99)
MICROALBUMIN UR-MCNC: 1.42 MG/DL
MICROALBUMIN/CREAT UR-RTO: 9 MG/G (ref 0–30)
MONOCYTES # BLD: 0.4 K/UL (ref 0–1)
MONOCYTES NFR BLD: 5 % (ref 5–13)
NEUTS SEG # BLD: 5.3 K/UL (ref 1.8–8)
NEUTS SEG NFR BLD: 64 % (ref 32–75)
NITRITE UR QL STRIP.AUTO: NEGATIVE
NRBC # BLD: 0 K/UL (ref 0–0.01)
NRBC BLD-RTO: 0 PER 100 WBC
PH UR STRIP: 5.5 [PH] (ref 5–8)
PLATELET # BLD AUTO: 293 K/UL (ref 150–400)
PMV BLD AUTO: 10.2 FL (ref 8.9–12.9)
POTASSIUM SERPL-SCNC: 4.2 MMOL/L (ref 3.5–5.1)
PROT SERPL-MCNC: 7.6 G/DL (ref 6.4–8.2)
PROT UR STRIP-MCNC: NEGATIVE MG/DL
RBC # BLD AUTO: 4.37 M/UL (ref 3.8–5.2)
RBC #/AREA URNS HPF: ABNORMAL /HPF (ref 0–5)
SODIUM SERPL-SCNC: 137 MMOL/L (ref 136–145)
SP GR UR REFRACTOMETRY: 1.02 (ref 1–1.03)
TRIGL SERPL-MCNC: 167 MG/DL (ref ?–150)
UA: UC IF INDICATED,UAUC: ABNORMAL
UROBILINOGEN UR QL STRIP.AUTO: 1 EU/DL (ref 0.2–1)
VLDLC SERPL CALC-MCNC: 33.4 MG/DL
WBC # BLD AUTO: 8.1 K/UL (ref 3.6–11)
WBC URNS QL MICRO: ABNORMAL /HPF (ref 0–4)

## 2021-05-25 LAB — TSH SERPL-ACNC: 1.3 UIU/ML (ref 0.45–4.5)

## 2021-07-08 ENCOUNTER — OFFICE VISIT (OUTPATIENT)
Dept: FAMILY MEDICINE CLINIC | Age: 61
End: 2021-07-08
Payer: MEDICAID

## 2021-07-08 VITALS
RESPIRATION RATE: 20 BRPM | SYSTOLIC BLOOD PRESSURE: 132 MMHG | DIASTOLIC BLOOD PRESSURE: 85 MMHG | HEIGHT: 66 IN | TEMPERATURE: 97 F | OXYGEN SATURATION: 98 % | WEIGHT: 240 LBS | HEART RATE: 76 BPM | BODY MASS INDEX: 38.57 KG/M2

## 2021-07-08 DIAGNOSIS — E11.9 CONTROLLED TYPE 2 DIABETES MELLITUS WITHOUT COMPLICATION, WITHOUT LONG-TERM CURRENT USE OF INSULIN (HCC): ICD-10-CM

## 2021-07-08 DIAGNOSIS — M79.89 LEG SWELLING: ICD-10-CM

## 2021-07-08 DIAGNOSIS — R74.8 ELEVATED LIVER ENZYMES: Primary | ICD-10-CM

## 2021-07-08 LAB
ALBUMIN SERPL-MCNC: 3.8 G/DL (ref 3.5–5)
ALBUMIN/GLOB SERPL: 1.1 {RATIO} (ref 1.1–2.2)
ALP SERPL-CCNC: 113 U/L (ref 45–117)
ALT SERPL-CCNC: 87 U/L (ref 12–78)
ANION GAP SERPL CALC-SCNC: 6 MMOL/L (ref 5–15)
APPEARANCE UR: CLEAR
AST SERPL-CCNC: 45 U/L (ref 15–37)
BACTERIA URNS QL MICRO: NEGATIVE /HPF
BASOPHILS # BLD: 0.1 K/UL (ref 0–0.1)
BASOPHILS NFR BLD: 1 % (ref 0–1)
BILIRUB SERPL-MCNC: 0.5 MG/DL (ref 0.2–1)
BILIRUB UR QL: NEGATIVE
BUN SERPL-MCNC: 12 MG/DL (ref 6–20)
BUN/CREAT SERPL: 17 (ref 12–20)
CALCIUM SERPL-MCNC: 9.6 MG/DL (ref 8.5–10.1)
CHLORIDE SERPL-SCNC: 105 MMOL/L (ref 97–108)
CO2 SERPL-SCNC: 29 MMOL/L (ref 21–32)
COLOR UR: ABNORMAL
COMMENT, HOLDF: NORMAL
CREAT SERPL-MCNC: 0.69 MG/DL (ref 0.55–1.02)
CREAT UR-MCNC: 179 MG/DL
DIFFERENTIAL METHOD BLD: ABNORMAL
EOSINOPHIL # BLD: 0.3 K/UL (ref 0–0.4)
EOSINOPHIL NFR BLD: 4 % (ref 0–7)
EPITH CASTS URNS QL MICRO: ABNORMAL /LPF
ERYTHROCYTE [DISTWIDTH] IN BLOOD BY AUTOMATED COUNT: 11.7 % (ref 11.5–14.5)
EST. AVERAGE GLUCOSE BLD GHB EST-MCNC: 177 MG/DL
GGT SERPL-CCNC: 69 U/L (ref 5–55)
GLOBULIN SER CALC-MCNC: 3.4 G/DL (ref 2–4)
GLUCOSE SERPL-MCNC: 126 MG/DL (ref 65–100)
GLUCOSE UR STRIP.AUTO-MCNC: 250 MG/DL
HAV IGM SER QL: NONREACTIVE
HBA1C MFR BLD: 7.8 % (ref 4–5.6)
HBV CORE IGM SER QL: NONREACTIVE
HBV SURFACE AG SER QL: <0.1 INDEX
HBV SURFACE AG SER QL: NEGATIVE
HCT VFR BLD AUTO: 39.6 % (ref 35–47)
HCV AB SERPL QL IA: NONREACTIVE
HCV COMMENT,HCGAC: NORMAL
HGB BLD-MCNC: 11.7 G/DL (ref 11.5–16)
HGB UR QL STRIP: NEGATIVE
HYALINE CASTS URNS QL MICRO: ABNORMAL /LPF (ref 0–5)
IMM GRANULOCYTES # BLD AUTO: 0 K/UL (ref 0–0.04)
IMM GRANULOCYTES NFR BLD AUTO: 1 % (ref 0–0.5)
KETONES UR QL STRIP.AUTO: ABNORMAL MG/DL
LEUKOCYTE ESTERASE UR QL STRIP.AUTO: NEGATIVE
LYMPHOCYTES # BLD: 1.9 K/UL (ref 0.8–3.5)
LYMPHOCYTES NFR BLD: 26 % (ref 12–49)
MCH RBC QN AUTO: 26.8 PG (ref 26–34)
MCHC RBC AUTO-ENTMCNC: 29.5 G/DL (ref 30–36.5)
MCV RBC AUTO: 90.8 FL (ref 80–99)
MICROALBUMIN UR-MCNC: 2.13 MG/DL
MICROALBUMIN/CREAT UR-RTO: 12 MG/G (ref 0–30)
MONOCYTES # BLD: 0.4 K/UL (ref 0–1)
MONOCYTES NFR BLD: 6 % (ref 5–13)
NEUTS SEG # BLD: 4.5 K/UL (ref 1.8–8)
NEUTS SEG NFR BLD: 62 % (ref 32–75)
NITRITE UR QL STRIP.AUTO: NEGATIVE
NRBC # BLD: 0 K/UL (ref 0–0.01)
NRBC BLD-RTO: 0 PER 100 WBC
PH UR STRIP: 6 [PH] (ref 5–8)
PLATELET # BLD AUTO: 277 K/UL (ref 150–400)
PMV BLD AUTO: 10.6 FL (ref 8.9–12.9)
POTASSIUM SERPL-SCNC: 4.1 MMOL/L (ref 3.5–5.1)
PROT SERPL-MCNC: 7.2 G/DL (ref 6.4–8.2)
PROT UR STRIP-MCNC: NEGATIVE MG/DL
RBC # BLD AUTO: 4.36 M/UL (ref 3.8–5.2)
RBC #/AREA URNS HPF: ABNORMAL /HPF (ref 0–5)
SAMPLES BEING HELD,HOLD: NORMAL
SODIUM SERPL-SCNC: 140 MMOL/L (ref 136–145)
SP GR UR REFRACTOMETRY: 1.03 (ref 1–1.03)
SP1: NORMAL
SP2: NORMAL
SP3: NORMAL
UA: UC IF INDICATED,UAUC: ABNORMAL
UROBILINOGEN UR QL STRIP.AUTO: 1 EU/DL (ref 0.2–1)
WBC # BLD AUTO: 7.3 K/UL (ref 3.6–11)
WBC URNS QL MICRO: ABNORMAL /HPF (ref 0–4)

## 2021-07-08 PROCEDURE — 99214 OFFICE O/P EST MOD 30 MIN: CPT | Performed by: FAMILY MEDICINE

## 2021-07-08 PROCEDURE — 3051F HG A1C>EQUAL 7.0%<8.0%: CPT | Performed by: FAMILY MEDICINE

## 2021-07-08 RX ORDER — GLIPIZIDE 10 MG/1
10 TABLET ORAL 2 TIMES DAILY
Qty: 180 TABLET | Refills: 3 | Status: SHIPPED | OUTPATIENT
Start: 2021-07-08 | End: 2021-07-08 | Stop reason: SDUPTHER

## 2021-07-08 RX ORDER — FUROSEMIDE 20 MG/1
20 TABLET ORAL
Qty: 30 TABLET | Refills: 1 | Status: SHIPPED | OUTPATIENT
Start: 2021-07-08 | End: 2021-08-02

## 2021-07-08 RX ORDER — GLIPIZIDE 10 MG/1
10 TABLET ORAL 2 TIMES DAILY
Qty: 180 TABLET | Refills: 3 | Status: SHIPPED | OUTPATIENT
Start: 2021-07-08 | End: 2021-10-13 | Stop reason: SDUPTHER

## 2021-07-08 NOTE — PROGRESS NOTES
Fiona Jeffery is a 63 y.o. female , established patient, here for evaluation of the following chief complaint(s):    HPI  Chief Complaint   Patient presents with    Leg Swelling     And stomach swelling     Started about Sunday         1. Elevated liver enzymes  Liver enzymes elevated. I will check further labs and evaluate with ultrasound. 2. Leg swelling  Patient reports bilateral leg swelling. She reports she drives a lot all day long. She is concerned and wants to check Dopplers of bilateral lower extremities. She denies any chest pain or shortness of breath. She denies any leg pain or calf pain. She also reports she has been eating [employer] fries recently and after that she developed bilateral leg swelling. I have advised patient to start low-sodium diet and increase fluid intake. I will check Dopplers and prescribed Lasix as needed. I also advised patient to keep blood pressure log while taking Lasix. 3. Controlled type 2 diabetes mellitus without complication, without long-term current use of insulin [facility])    Patient presents today for diabetes follow up. Pt. current diabetic medications include Metformin. Taking medication as prescribed. Current monitoring regimen: home blood tests - rarely. Home blood sugar records: fasting range: Does not check. Any episodes of hypoglycemia? no. Known diabetic complications: peripheral neuropathy. Cardiovascular risk factors: dyslipidemia. Not taking currently on ASA, not taking statin, not taking ACE/ARB. Diabetic Foot and Eye Exam HM Status   Topic Date Due    Diabetic Foot Care  Never done    Eye Exam  Never done     There is no immunization history for the selected administration types on file for this patient.   Lab Results   Component Value Date/Time    Microalbumin/Creat ratio (mg/g creat) 9 05/20/2021 09:19 AM    Microalbumin,urine random 1.42 05/20/2021 09:19 AM     Lab Results   Component Value Date/Time    Hemoglobin A1c 7.2 (H) 05/20/2021 09:19 AM    Hemoglobin A1c (POC) 5.9 (A) 10/10/2018 04:04 PM             Review of Systems   Constitutional: Negative. HENT: Negative. Eyes: Negative. Respiratory: Negative. Cardiovascular: Positive for leg swelling. Gastrointestinal: Negative. Genitourinary: Negative. Musculoskeletal: Negative. Skin: Negative. Neurological: Negative. Endo/Heme/Allergies: Negative. Psychiatric/Behavioral: Negative. Physical Exam  Vitals and nursing note reviewed. HENT:      Head: Normocephalic and atraumatic. Right Ear: External ear normal.      Left Ear: External ear normal.      Nose: Nose normal.   Eyes:      Conjunctiva/sclera: Conjunctivae normal.   Cardiovascular:      Rate and Rhythm: Normal rate and regular rhythm. Pulmonary:      Effort: Pulmonary effort is normal.      Breath sounds: Normal breath sounds. Abdominal:      General: Bowel sounds are normal. There is no distension. Palpations: Abdomen is soft. Tenderness: There is no abdominal tenderness. Musculoskeletal:         General: Normal range of motion. Cervical back: Normal range of motion and neck supple. Skin:     General: Skin is warm and dry. Neurological:      Mental Status: She is alert. /85 (BP 1 Location: Left upper arm, BP Patient Position: Sitting, BP Cuff Size: Adult)   Pulse 76   Temp 97 °F (36.1 °C) (Temporal)   Resp 20   Ht 5' 6\" (1.676 m)   Wt 240 lb (108.9 kg)   SpO2 98%   BMI 38.74 kg/m²     Allergies   Allergen Reactions    Metformin Nausea Only    Percocet [Oxycodone-Acetaminophen] Other (comments)     \"It makes me feel bad, real bad,like I have the flu. \"       Current Outpatient Medications   Medication Sig    furosemide (LASIX) 20 mg tablet Take 1 Tablet by mouth daily as needed (leg swelling).  glipiZIDE (GLUCOTROL) 10 mg tablet Take 1 Tablet by mouth two (2) times a day.  bisacodyL (DULCOLAX) 5 mg EC tablet Take 5 mg by mouth.  As needed  metFORMIN (GLUCOPHAGE) 500 mg tablet TAKE 2 TABS BY MOUTH TWO (2) TIMES DAILY (WITH MEALS).  diclofenac EC (VOLTAREN) 50 mg EC tablet Take 1 Tab by mouth two (2) times a day.  solifenacin (VESICARE) 5 mg tablet     ciclopirox (PENLAC) 8 % solution APPLY TOPICALLY ONCE DAILY    ferrous sulfate (Iron) 325 mg (65 mg iron) tablet Take  by mouth Daily (before breakfast).  cholecalciferol (Vitamin D3) (1000 Units /25 mcg) tablet Take  by mouth daily.  docusate sodium (Stool Softener) 100 mg capsule Take 100 mg by mouth two (2) times a day.  multivitamin (DAILY VITAMINS PO) Take  by mouth.  omega 3-DHA-EPA-fish oil (FISH OIL) 1,000 mg (120 mg-180 mg) capsule Take 1 Cap by mouth daily. Indications: high amount of triglyceride in the blood    budesonide-formoterol (SYMBICORT) 80-4.5 mcg/actuation HFAA Take 2 Puffs by inhalation two (2) times a day.  montelukast (SINGULAIR) 10 mg tablet Take 1 Tab by mouth daily.  latanoprost (XALATAN) 0.005 % ophthalmic solution Administer 1 Drop to both eyes nightly.  Nebulizer Accessories kit Use face mask & tubing for nebulizer machine    Nebulizer & Compressor machine Use every 6 hours as needed for shortness of breath     No current facility-administered medications for this visit.        Past Medical History:   Diagnosis Date    Adverse effect of anesthesia     WITHIN 20-30 MINUTES AFTER WAKING UP FROM CARPAL TUNNEL SURGERY, HAD DIFFICULTY BREATHING ,     Anemia     Anxiety     Glucose intolerance (impaired glucose tolerance)     Menopause 2018       Past Surgical History:   Procedure Laterality Date    HX CARPAL TUNNEL RELEASE Bilateral     HX  SECTION      HX HYSTERECTOMY  2018    HX LAP CHOLECYSTECTOMY      HX OOPHORECTOMY Bilateral 2018    HX ORTHOPAEDIC Left 2015    SPUR REMOVED TO LEFT FOOT    HX ORTHOPAEDIC Left     SPIN AND SCREW IN LEFT ARM       Problem List  Date Reviewed: 2021        Codes Class Noted Stridor ICD-10-CM: R06.1  ICD-9-CM: 786.1  11/28/2018        Severe obesity ([address] ICD-10-CM: E66.01  ICD-9-CM: 278.01  10/10/2018        SOB (shortness of breath) ICD-10-CM: R06.02  ICD-9-CM: 786.05  10/10/2018        Pain of right hip joint ICD-10-CM: M25.551  ICD-9-CM: 719.45  10/10/2018        Obstructive sleep apnea syndrome ICD-10-CM: G47.33  ICD-9-CM: 327.23  2/2/2018        Snoring ICD-10-CM: R06.83  ICD-9-CM: 786.09  11/29/2017        Glucose intolerance (impaired glucose tolerance) ICD-10-CM: R73.02  ICD-9-CM: 790.22  Unknown        Anemia ICD-10-CM: D64.9  ICD-9-CM: 285. 9  Unknown               No results found for this visit on 07/08/21. 1. Elevated liver enzymes    - METABOLIC PANEL, COMPREHENSIVE; Future  - GGT; Future  - HEPATITIS PANEL, ACUTE; Future  - US ABD COMP; Future  - HEPATITIS PANEL, ACUTE  - GGT  - METABOLIC PANEL, COMPREHENSIVE    2. Leg swelling    - URINALYSIS W/ REFLEX CULTURE; Future  - MICROALBUMIN, UR, RAND W/ MICROALB/CREAT RATIO; Future  - furosemide (LASIX) 20 mg tablet; Take 1 Tablet by mouth daily as needed (leg swelling). Dispense: 30 Tablet; Refill: 1  - DUPLEX LOWER EXT VENOUS BILAT; Future  - MICROALBUMIN, UR, RAND W/ MICROALB/CREAT RATIO  - URINALYSIS W/ REFLEX CULTURE    3. Controlled type 2 diabetes mellitus without complication, without long-term current use of insulin (HCC)    - HEMOGLOBIN A1C WITH EAG; Future  - URINALYSIS W/ REFLEX CULTURE; Future  - MICROALBUMIN, UR, RAND W/ MICROALB/CREAT RATIO; Future  - CBC WITH AUTOMATED DIFF; Future  - METABOLIC PANEL, COMPREHENSIVE; Future  - glipiZIDE (GLUCOTROL) 10 mg tablet; Take 1 Tablet by mouth two (2) times a day. Dispense: 180 Tablet;  Refill: 3  - METABOLIC PANEL, COMPREHENSIVE  - HEMOGLOBIN A1C WITH EAG  - CBC WITH AUTOMATED DIFF  - MICROALBUMIN, UR, RAND W/ MICROALB/CREAT RATIO  - URINALYSIS W/ REFLEX CULTURE        Follow-up and Dispositions    · Return in about 6 months (around 1/8/2022), or if symptoms worsen or fail to improve, for follow up. I ADVISED PATIENT TO GO TO ER IF SYMPTOMS WORSEN , CHANGE OR FAILS TO IMPROVE. I have discussed the diagnosis with the patient and the intended plan as seen in the above orders. The patient has received an after-visit summary and questions were answered concerning future plans. I have discussed medication side effects and warnings with the patient as well. The patient agrees and understands above plan.            Israel Bobo MD

## 2021-07-08 NOTE — PATIENT INSTRUCTIONS

## 2021-07-08 NOTE — PROGRESS NOTES
Patient stated name &     Chief Complaint   Patient presents with    Leg Swelling     And stomach swelling     Started about           Health Maintenance Due   Topic    Pneumococcal 0-64 years (1 of 2 - PPSV23)    Foot Exam Q1     Eye Exam Retinal or Dilated     Shingrix Vaccine Age 50> (1 of 2)       Wt Readings from Last 3 Encounters:   21 240 lb (108.9 kg)   21 240 lb (108.9 kg)   12/15/20 232 lb 3.2 oz (105.3 kg)     Temp Readings from Last 3 Encounters:   21 97 °F (36.1 °C) (Temporal)   21 97.2 °F (36.2 °C) (Temporal)   12/15/20 98 °F (36.7 °C) (Oral)     BP Readings from Last 3 Encounters:   21 132/85   21 131/82   12/15/20 135/80     Pulse Readings from Last 3 Encounters:   21 76   21 77   12/15/20 63         Learning Assessment:  :     Learning Assessment 2020 3/31/2020   PRIMARY LEARNER Patient Patient   CO-LEARNER CAREGIVER No No   PRIMARY LANGUAGE ENGLISH ENGLISH   LEARNER PREFERENCE PRIMARY DEMONSTRATION DEMONSTRATION   ANSWERED BY patient  patient    RELATIONSHIP SELF SELF       Depression Screening:  :     3 most recent PHQ Screens 2021   Little interest or pleasure in doing things Not at all   Feeling down, depressed, irritable, or hopeless Not at all   Total Score PHQ 2 0       Fall Risk Assessment:  :     Fall Risk Assessment, last 12 mths 2021   Able to walk? Yes   Fall in past 12 months? 0   Do you feel unsteady? 0   Are you worried about falling 0       Abuse Screening:  :     Abuse Screening Questionnaire 2021   Do you ever feel afraid of your partner? N   Are you in a relationship with someone who physically or mentally threatens you? N   Is it safe for you to go home? Y       Coordination of Care Questionnaire:  :     1) Have you been to an emergency room, urgent care clinic since your last visit? No     Hospitalized since your last visit?  No             2) Have you seen or consulted any other health care providers outside of [address] [place] [address] since your last visit? No  (Include any pap smears or colon screenings in this section.)    Patient is accompanied by self I have received verbal consent from Tam Talavera to discuss any/all medical information while they are present in the room.

## 2021-07-16 ENCOUNTER — HOSPITAL ENCOUNTER (OUTPATIENT)
Dept: ULTRASOUND IMAGING | Age: 61
Discharge: HOME | End: 2021-07-16
Attending: FAMILY MEDICINE
Payer: MEDICAID

## 2021-07-16 DIAGNOSIS — R74.8 ELEVATED LIVER ENZYMES: ICD-10-CM

## 2021-07-16 PROCEDURE — 76700 US EXAM ABDOM COMPLETE: CPT

## 2021-07-17 ENCOUNTER — HOSPITAL ENCOUNTER (OUTPATIENT)
Dept: VASCULAR SURGERY | Age: 61
Discharge: HOME | End: 2021-07-17
Attending: FAMILY MEDICINE
Payer: MEDICAID

## 2021-07-17 DIAGNOSIS — M79.89 LEG SWELLING: ICD-10-CM

## 2021-07-17 PROCEDURE — 93970 EXTREMITY STUDY: CPT

## 2021-07-30 DIAGNOSIS — M79.89 LEG SWELLING: ICD-10-CM

## 2021-08-02 RX ORDER — FUROSEMIDE 20 MG/1
TABLET ORAL
Qty: 30 TABLET | Refills: 1 | Status: SHIPPED | OUTPATIENT
Start: 2021-08-02 | End: 2021-08-30

## 2021-08-03 ENCOUNTER — HOSPITAL ENCOUNTER (OUTPATIENT)
Dept: MAMMOGRAPHY | Age: 61
Discharge: HOME | End: 2021-08-03
Attending: FAMILY MEDICINE
Payer: MEDICAID

## 2021-08-03 DIAGNOSIS — Z12.31 ENCOUNTER FOR SCREENING MAMMOGRAM FOR MALIGNANT NEOPLASM OF BREAST: ICD-10-CM

## 2021-08-03 PROCEDURE — 77067 SCR MAMMO BI INCL CAD: CPT

## 2021-08-27 DIAGNOSIS — M79.89 LEG SWELLING: ICD-10-CM

## 2021-08-30 RX ORDER — FUROSEMIDE 20 MG/1
TABLET ORAL
Qty: 30 TABLET | Refills: 1 | Status: SHIPPED | OUTPATIENT
Start: 2021-08-30 | End: 2021-09-27

## 2021-09-25 DIAGNOSIS — M79.89 LEG SWELLING: ICD-10-CM

## 2021-09-27 RX ORDER — FUROSEMIDE 20 MG/1
TABLET ORAL
Qty: 30 TABLET | Refills: 1 | Status: SHIPPED | OUTPATIENT
Start: 2021-09-27 | End: 2021-10-22

## 2021-09-29 ENCOUNTER — VIRTUAL VISIT (OUTPATIENT)
Dept: DIABETES SERVICES | Age: 61
End: 2021-09-29
Payer: MEDICAID

## 2021-09-29 DIAGNOSIS — E11.9 CONTROLLED TYPE 2 DIABETES MELLITUS WITHOUT COMPLICATION, WITHOUT LONG-TERM CURRENT USE OF INSULIN (HCC): ICD-10-CM

## 2021-09-29 PROCEDURE — G0108 DIAB MANAGE TRN  PER INDIV: HCPCS | Performed by: DIETITIAN, REGISTERED

## 2021-09-29 NOTE — PROGRESS NOTES
[address] [place] [address] for Diabetes Health  Diabetes Self-Management Education & Support Program  Pre-program Assessment    Reason for Referral: T2DM  Referral Source: Katarzyna Swenson MD  Services requested: DSMES    ASSESSMENT    From my perspective, the participant would benefit from [facility] specifically related to Reducing risks, Healthy eating, Monitoring, Physical activity, Taking medications, Healthy coping and Problem solving. Will adapt DSMES program to build on participant's skills score and confidence score as noted in the Diabetes Skills, Confidence, and Preparedness Index. During the program, we will focus on providing DSMES that specifically addresses participant's interest in Healthy eating and Problem solving, as shown by their reported readiness to change. The participant would be best served by attending weekly individual sessions, as participant shared she is scheduled to work during the scheduled group class. Diabetes Self-Management Education Follow-up Visit: 10/07/21       Clinical Presentation  Tam Talavera is a 63 y.o.  female referred for diabetes self-management education. Participant has Type 2 DM not on insulin for: participant does not know amount of time with DM, shared she was dx with pre-DM in 2009. Family history positive for diabetes. Patient reports not receiving DSMES services in the past. Most recent A1c value:   Lab Results   Component Value Date/Time    Hemoglobin A1c 7.8 (H) 07/08/2021 02:03 PM    Hemoglobin A1c (POC) 5.9 (A) 10/10/2018 04:04 PM       Diabetes-related medical history:  Neurological complications:  Peripheral neuropathy    Diabetes-related medications:  Current dosing:   Key Antihyperglycemic Medications             glipiZIDE (GLUCOTROL) 10 mg tablet Take 1 Tablet by mouth two (2) times a day. metFORMIN (GLUCOPHAGE) 500 mg tablet TAKE 2 TABS BY MOUTH TWO (2) TIMES DAILY (WITH MEALS).           Blood Pressure Management  Key ACE/ARB Medications     Patient is on no ACE or ARB meds. Lipid Management  Key Antihyperlipidemia Meds             omega 3-DHA-EPA-fish oil (FISH OIL) 1,000 mg (120 mg-180 mg) capsule Take 1 Cap by mouth daily. Indications: high amount of triglyceride in the blood          Clot Prevention  Key Anti-Platelet Anticoagulant Meds     The patient is on no antiplatelet meds or anticoagulants. Learning Assessment  Learning objectives Educator assessment (9/29/2021)   Diabetes Disease Process  The participant can   A) describe diabetes in basic terms;   B) state the type of diabetes they have; &   C) state accepted blood glucose targets. Healthy Eating  The participant can   A) identify carbohydrate foods; &   B) accurately read food labels. Being Active  The participant can  A) state the benefits of physical activity;  B) report their current PA practices;  C) identify PA they would consider incorporating in their lives; &  D) develop an implementation plan. Monitoring  The participant can  A) operate their blood glucose meter; &  B) describe how they log their blood glucoses to share with their provider. Taking Medications  The participant can  A) name their diabetes medications;  B) state the purpose and dose;  C) note side effects; &  D) describe proper storage, disposal & transport (if appropriate). Healthy Coping  The participant can    A) describe their response to diabetes diagnosis; B) describe their specific coping mechanisms;  C) identify supportive people and/or other resources that positively support their diabetes self-care and health. Reducing Risks  The participant can describe the preventive measures used by providers to promote health and prevent diabetes complications.      Problem Solving  The participant can   A) identify signs, symptoms & treatment of hypoglycemia;   B) identify signs, symptoms & treatment of hyperglycemia;  C) describe their sick day plan; &  D) identify BG patterns to discuss with their provider. No  No  Yes        No  No        No  No  No  Yes        Yes  No          Yes  Yes  Yes  Yes      Yes  No  Yes, Participant identified her . Yes          No, Participant identified s/s, did not identify treatment. No  No  Yes     Characteristics to Learning   Barriers to Learning   [] Cognitive loss  [] Mental retardation   [] Intellectual delay/cognitive impairment  [] Psychiatric disorder  [] Visually impaired  [] Hearing loss                 [] Low literacy (difficulty with written text)  [] Low numeracy (difficulty with mathematical information  [] Low health literacy (difficulty with understanding health information & services  [] Language  [] Functional limitation  [x] Pain   [] Financial  [] Transportation  [] None  Other: Work schedule   Favorite Ways to Learn   [] Lecture  [x] Slides  [] Reading [x] Video-Internet  [] Cassettes/CDs/MP3's  [] Interactive Small Groups [] Other       Behavioral Assessment  Current self-care practices  Educator assessment (9/29/2021)   Healthy Eating  Current practices  24-hour Dietary Recall:  Breakfast: None   Lunch: Quiroz, lettuce, and tomato sandwich (1/2), tater tots  Dinner: 1/4 salmon filet, broccoli, cheese and broccoli rice (1 cup), salad made with spinach, tomatoes, 1/2 cup fruit (strawberries, apples, grapes)  Snacks: Iced oatmeal cookies (x3), candy bar  Beverages: Cherry limeade (medium, with lunch), water  Alcohol: None    *Participant shared she eats fast food regularly d/t her work schedule and fear of experiencing hypoglycemia. Participant shared she eats snacks like her candy bar in the evening d/t fear of hypoglycemia. Participant shared she usually has sweetened cereal with lactose-free milk for breakfast, skips breakfast a few days per week. Participant shared she is very interested in learning more about healthy eating.      Would benefit from [facility] related to Healthy Eating: Yes      Eats a carbohydrate controlled diet: No      Stage of change: Preparation   Being Active  Current practices  How many days during the past week have you performed physical activity where your heart beats faster and your breathing is harder than normal for 30 minutes or more?  0 days    How many days in a typical week do you perform activity such as this?  7 days (3 months ago)    *Participant shared she was regularly using an elliptical and engaging in resistance exercises and calisthenics, shared she has not been physically active x3 months d/t experiencing pain and has plans to follow-up with provider. Would benefit from [facility] related to Being Active: Yes      Exercises 150 minutes/week: No      Stage of change: Preparation     Monitoring  Current practices  Do you monitor your blood sugar? Yes    How often do you monitor? 2x/day, and when experiencing s/s of hypoglycemia    What are the range of readings?  mg/dL  Breakfast: 127-130 mg/dL (preprandial)  Bedtime: 125-127 mg/dL    Do you know your last A1c measurement? Yes    Do you know the meaning of the A1c? No     Would benefit from [facility] related to Monitoring: Yes      Uses BG readings to establish trends and understand BG patterns: Yes      Stage of change: Action   Taking Medication  Current practices  Do you understand what your diabetes medications do? No    How often do you miss doses of your diabetes medications? Forgets bedtime dose 1-2x/month    Can you afford your diabetes medications? Yes   Would benefit from [facility] related to Taking Medication: Yes      Takes medications consistently to receive full benefit: Yes      Stage of change: Action       Healthy Coping   Current state  Diabetes Skills, Confidence and Preparedness Index:   Total score: 4.5  Skills: 3.0  Confidence: 5.0  Preparedness: 6.0   Would benefit from DSMES related to Healthy Coping: Yes      Identifies specific people, organizations,etc, that actively support their diabetes self-care efforts: Yes      Stage of change: Preparation     Reducing Risks  Current state  Vaccines:  Influenza:   Immunization History   Administered Date(s) Administered    Influenza High Dose Vaccine PF 02/02/2018    Influenza Nasal Vaccine 10/19/2018    Influenza Vaccine 09/03/2020    Influenza Vaccine (>6 mo Afluria QUAD Vial 58130 (0.25 mL) / 99040 (0.5 mL)) 02/22/2017    Influenza Vaccine [place]) PF (>6 Mo Flulaval, Fluarix, and >3 Yrs Afluria, Fluzone 94102) 10/08/2019    Influenza Vaccine (Quadrivalent)(>18 Yrs Flublok 39402) 09/21/2020       Pneumococcal: There is no immunization history for the selected administration types on file for this patient. Hepatitis: Participant shared she has received this vaccine. Examinations:  Dilated eye exam: Last appointment was: Within the past one year per participant report    Dental exam: Last appointment was: 1.5 years ago per participant report    Foot exam: Last appointment was: Greater than one year ago per participant report, participant shared she has plans to schedule this appointment    Heart Protection:  BP Readings from Last 2 Encounters:   07/08/21 132/85   05/20/21 131/82        Lab Results   Component Value Date/Time    LDL, calculated 108.6 (H) 05/20/2021 09:19 AM        Kidney Protection:  Lab Results   Component Value Date/Time    Microalbumin/Creat ratio (mg/g creat) 12 07/08/2021 02:03 PM    Microalbumin,urine random 2.13 07/08/2021 02:03 PM        Would benefit from [facility] related to Reducing Risks: Yes      Actively participates in decision-making with provider regarding secondary prevention:  Yes      Stage of change: Action   Problem Solving  Current state  Hypoglycemia Management:  What are signs and symptoms of hypoglycemia that you experience? Shaking/trembling, Sweat/clammy skin    How do you prevent hypoglycemia? Consistent meals/snack times    How do you treat hypoglycemia?  Participant shared she would have a piece of candy or eat something (like a fried fish sandwich)    Hyperglycemia Management:  What are signs and symptoms of hyperglycemia that you experience? Pt reported being unaware of s/s of hyperglycemia    How can you prevent hyperglycemia? Pt reported being unaware of how to prevent hyperglycemia    Sick Day Management:  What do you do differently on sick days? Pt reported being unaware of self-management on sick days    Pattern Management:  Do you notice blood glucose patterns when you look at the readings in your meter or logbook? Yes    How do you use the blood glucose readings from your meter or logbook? Understand how body responds to meals     Would benefit from [facility] related to Problem Solving: Yes      Articulates appropriate strategies to address hypoglycemia, hyperglycemia, sick day care and BG pattern: No, Participant identified appropriate strategy for preventing hypoglycemia and using blood glucose readings. Stage of change: Action     Note: Content derived from the American Association of Diabetes Educators' Diabetes Education Curriculum: A Guide to Successful Self-Management (3rd edition)      Education provided: Reviewed with participant hypoglycemia prevention and treatment, participant expressed understanding. Nolan Hyde MS, RDN on 9/29/2021 at 11:16 AM    [facility] Emergency Adaptations for Telehealth:  Perfect[employer] Man was evaluated through a synchronous (real-time) audio-video encounter. The patient (or guardian if applicable) is aware that this is a billable service. Verbal consent to proceed has been obtained within the past 12 months. The visit was conducted pursuant to the emergency declaration under the [address] [place] [address], [address] [place] [address] authority and the [employer] Act. Patient identification was verified, and a caregiver was present when  appropriate.  The patient was located in a [facility] where the provider was credentialed to provide care. Encounter date: 9/29/2021  Time in appointment: 62 minutes    Additional Data for SCPI:  Diabetes Skills, Confidence & Preparedness Index (SCPI) ©  All scales and questions are out of 7. Overall SCPI score: 4.5 Skills Score: 3.0  Low: Healthy Eating(Q1) Confidence Score: 5.0  Low: Healthy Coping(Q2),Reducing Risks(Q3) Preparedness Score: 6.0  Low: Healthy Eating(Q1),Being Active(Q2),Healthy Coping(Q3),Reducing Risks(Q4),Blood Sugar Monitoring(Q6),Problem Solving(Q7)   Healthy Eating Score: 5.0  Low: Skills(Q1) Taking Medication Score: 2.0  Low: Skills(Q2) Blood Sugar Monitoring Score: 5.0  Low: PNAUCX(A4) Reducing Risks Score: 3.0  Low: Skills(Q5),Skills(Q9),Confidence(Q3)   Problem Solving Score: 4.7  Low: Skills(Q6) Healthy Coping Score: 4.3  Low: Confidence(Q2) Being Active Score: 6.0  Low: Confidence(Q5),Preparedness(Q2)     Skills/Knowledge Questions   1. I know how to plan meals that have the best balance between carbohydrates, proteins and vegetables. 1   2. I know how my diabetes medications (pills, injectables and/or insulin) work in my body. 2   3. I know when to check my blood sugar if I want to see how my body responded to a meal. 6   4. I know when to check my blood sugars to determine if my medication or insulin doses are correct. 2   5. I know what to do to prevent a low blood sugar when I exercise (either before, during, or after). 2   6. When I am sick, I know what to do differently with my diabetes management. 2   7. I know how stress can affect my diabetes management. 5   8. When I look at my blood sugars over a given week, I can explain what my blood sugar pattern is. 5   9. I know what my target levels are for A1c, blood pressure and cholesterol. 2   Confidence Questions   1. I am confident that I can plan balanced meals and snacks. 6   2. I am confident that I can manage my stress. 2   3.  I am confident that I can prevent a low blood sugar during or after exercise. 2   4. I am confident that the next time I eat out, I will be able to choose foods that best keep my blood sugars in target. 7   5. I am confident I can include exercise into my schedule. 6   6. I am confident that I can use my daily blood sugars to adjust my diet, my activity, and/or my insulin. 6   7. When something out of my normal routine happens, I am confident that I can problem-solve and keep my diabetes on track. 6   Preparedness Questions   1. Within the next month, I will begin to eat more balanced meals and snacks. 6   2. Within the next month, I will choose an exercise activity and I will start fitting it into my schedule. 6   3. Within the next month, I will make a list of stress management options that work for me. 6   4. Within the next month, I will consistently plan ahead to prevent low blood sugars. 6   5. Within the next month, I will start adjusting my insulin doses on my own. 0   6. Within the next month, I will begin making changes to my diabetes management based on my daily blood sugars (eg - eating, activity and/or insulin). 6   7. Within the next month, I will begin making changes to my diabetes management to meet my overall goals (eg - eating, activity and/or insulin).  6

## 2021-10-13 ENCOUNTER — OFFICE VISIT (OUTPATIENT)
Dept: FAMILY MEDICINE CLINIC | Age: 61
End: 2021-10-13
Payer: MEDICAID

## 2021-10-13 VITALS
SYSTOLIC BLOOD PRESSURE: 130 MMHG | WEIGHT: 245.4 LBS | HEART RATE: 82 BPM | DIASTOLIC BLOOD PRESSURE: 82 MMHG | TEMPERATURE: 98 F | HEIGHT: 66 IN | RESPIRATION RATE: 20 BRPM | BODY MASS INDEX: 39.44 KG/M2 | OXYGEN SATURATION: 98 %

## 2021-10-13 DIAGNOSIS — E78.2 MIXED HYPERLIPIDEMIA: ICD-10-CM

## 2021-10-13 DIAGNOSIS — J30.89 ENVIRONMENTAL AND SEASONAL ALLERGIES: Primary | ICD-10-CM

## 2021-10-13 DIAGNOSIS — R05.3 CHRONIC COUGH: ICD-10-CM

## 2021-10-13 DIAGNOSIS — E11.9 CONTROLLED TYPE 2 DIABETES MELLITUS WITHOUT COMPLICATION, WITHOUT LONG-TERM CURRENT USE OF INSULIN (HCC): ICD-10-CM

## 2021-10-13 PROCEDURE — 99214 OFFICE O/P EST MOD 30 MIN: CPT | Performed by: FAMILY MEDICINE

## 2021-10-13 PROCEDURE — 3051F HG A1C>EQUAL 7.0%<8.0%: CPT | Performed by: FAMILY MEDICINE

## 2021-10-13 RX ORDER — MINERAL OIL
180 ENEMA (ML) RECTAL DAILY
Qty: 90 TABLET | Refills: 5 | Status: SHIPPED | OUTPATIENT
Start: 2021-10-13

## 2021-10-13 RX ORDER — GLIPIZIDE 10 MG/1
10 TABLET ORAL
Qty: 180 TABLET | Refills: 3 | Status: SHIPPED | OUTPATIENT
Start: 2021-10-13 | End: 2022-08-18 | Stop reason: SDUPTHER

## 2021-10-13 NOTE — PROGRESS NOTES
Patient stated name &     Chief Complaint   Patient presents with    Cough     Persistent     Had 3 covid shots     On and off 2 years but recently more frequently in past 2 weeks    No treatment    Blood sugar problem     blood sugar too low        Health Maintenance Due   Topic    Foot Exam Q1     Eye Exam Retinal or Dilated     Shingrix Vaccine Age 50> (1 of 2)    Flu Vaccine (1)       Wt Readings from Last 3 Encounters:   10/13/21 245 lb 6.4 oz (111.3 kg)   21 240 lb (108.9 kg)   21 240 lb (108.9 kg)     Temp Readings from Last 3 Encounters:   10/13/21 98 °F (36.7 °C) (Temporal)   21 97 °F (36.1 °C) (Temporal)   21 97.2 °F (36.2 °C) (Temporal)     BP Readings from Last 3 Encounters:   10/13/21 130/82   21 132/85   21 131/82     Pulse Readings from Last 3 Encounters:   10/13/21 82   21 76   21 77         Learning Assessment:  :     Learning Assessment 2020 3/31/2020   PRIMARY LEARNER Patient Patient   Georgette Dorsey CAREGIVER No No   PRIMARY LANGUAGE ENGLISH ENGLISH   LEARNER PREFERENCE PRIMARY DEMONSTRATION DEMONSTRATION   ANSWERED BY patient  patient    RELATIONSHIP SELF SELF       Depression Screening:  :     3 most recent PHQ Screens 10/13/2021   Little interest or pleasure in doing things Not at all   Feeling down, depressed, irritable, or hopeless Not at all   Total Score PHQ 2 0       Fall Risk Assessment:  :     Fall Risk Assessment, last 12 mths 2021   Able to walk? Yes   Fall in past 12 months? 0   Do you feel unsteady? 0   Are you worried about falling 0       Abuse Screening:  :     Abuse Screening Questionnaire 2021   Do you ever feel afraid of your partner? N   Are you in a relationship with someone who physically or mentally threatens you? N   Is it safe for you to go home? Y       Coordination of Care Questionnaire:  :     1) Have you been to an emergency room, urgent care clinic since your last visit?   No    Hospitalized since your last visit? No             2) Have you seen or consulted any other health care providers outside of [address] [place] [address] since your last visit? No  (Include any pap smears or colon screenings in this section.)    3) Do you have an Advance Directive on file? No    Patient is accompanied by self I have received verbal consent from Celso Castañeda to discuss any/all medical information while they are present in the room.

## 2021-10-13 NOTE — PROGRESS NOTES
10/13/2021   Loly Sheridan (: 1960) is a 63 y.o. female, established patient, here for evaluation of the following chief complaint(s):  Cough (Persistent     Had 3 covid shots     On and off 2 years but recently more frequently in past 2 weeks    No treatment) and Blood sugar problem (blood sugar too low)     ASSESSMENT/PLAN:  Below is the assessment and plan developed based on review of pertinent history, physical exam, labs, studies, and medications. 1. Environmental and seasonal allergies  -     fexofenadine (ALLEGRA) 180 mg tablet; Take 1 Tablet by mouth daily. , Normal, Disp-90 Tablet, R-5  -     REFERRAL TO ALLERGY  2. Controlled type 2 diabetes mellitus without complication, without long-term current use of insulin (HCC)  -     HEMOGLOBIN A1C WITH EAG; Future  -     URINALYSIS W/ REFLEX CULTURE; Future  -     CBC WITH AUTOMATED DIFF; Future  -     METABOLIC PANEL, COMPREHENSIVE; Future  -     glipiZIDE (GLUCOTROL) 10 mg tablet; Take 1 Tablet by mouth daily (with lunch). , Normal, Disp-180 Tablet, R-3  3. Chronic cough  -     XR CHEST PA LAT; Future  4. Mixed hyperlipidemia  -     LIPID PANEL; Future    Return in about 2 weeks (around 10/27/2021) for follow up, blood glucose log. SUBJECTIVE/OBJECTIVE:  HPI   1. Controlled type 2 diabetes mellitus without complication, without long-term current use of insulin (HCC)  Decrease dose of glipizide to once daily with lunch. Patient presents today for diabetes follow up. Pt. current diabetic medications include Metformin and glipizide 10 mg twice a day. Taking medication as prescribed. Current monitoring regimen: home blood tests - daily. Home blood sugar records: fasting range: 110s. Any episodes of hypoglycemia? yes -patient reports sometimes she has low blood sugars in 60s fasting in the morning. Known diabetic complications: none. Cardiovascular risk factors: dyslipidemia.    Diabetic Foot and Eye Exam HM Status   Topic Date Due    Diabetic Foot Care Never done    Eye Exam  Never done     There is no immunization history for the selected administration types on file for this patient. Lab Results   Component Value Date/Time    Microalbumin/Creat ratio (mg/g creat) 12 07/08/2021 02:03 PM    Microalbumin,urine random 2.13 07/08/2021 02:03 PM     Lab Results   Component Value Date/Time    Hemoglobin A1c 7.8 (H) 07/08/2021 02:03 PM    Hemoglobin A1c (POC) 5.9 (A) 10/10/2018 04:04 PM           2. Environmental and seasonal allergies  3. Chronic cough  Patient reports chronic cough. This has been going on for past several months. Has allergies to mold. She has been tested previously for allergies. Reports she goes to a client's house which is normal.  Patient reports symptoms of developing a tickle in her throat and that triggers her cough. The cough is dry. Denies fever chills body aches. She is not taking Singulair as prescribed. I have discussed with her to start taking Singulair daily. Also start Guerraview. Will refer to allergist for allergy shots. I will also check an x-ray. 4. Mixed hyperlipidemia  We discussed to start statin medication if cholesterol has not improved. Patient has fatty liver disease. Patient presents today for hyperlipidemia. Patient currently taking fish oil. Taking medication as prescribed without side effects. Trying to follow a low cholesterol diet.  Exercising none  Skips doses of meds: None    Lab Results   Component Value Date/Time    Cholesterol, total 212 (H) 05/20/2021 09:19 AM    Cholesterol (POC) 173 05/31/2017 02:21 PM    HDL Cholesterol 70 05/20/2021 09:19 AM    HDL Cholesterol (POC) 66 05/31/2017 02:21 PM    LDL Cholesterol (POC) 85 05/31/2017 02:21 PM    LDL, calculated 108.6 (H) 05/20/2021 09:19 AM    VLDL, calculated 33.4 05/20/2021 09:19 AM    Triglyceride 167 (H) 05/20/2021 09:19 AM    Triglycerides (POC) 113 05/31/2017 02:21 PM    CHOL/HDL Ratio 3.0 05/20/2021 09:19 AM         No results found for any visits on 10/13/21. Review of Systems   Constitutional: Negative. HENT: Positive for congestion. Eyes: Negative. Respiratory: Positive for cough. Cardiovascular: Negative. Gastrointestinal: Negative. Genitourinary: Negative. Musculoskeletal: Negative. Skin: Negative. Neurological: Negative. Endo/Heme/Allergies: Negative. Psychiatric/Behavioral: Negative. Physical Exam  Vitals and nursing note reviewed. HENT:      Head: Normocephalic and atraumatic. Right Ear: External ear normal.      Left Ear: External ear normal.      Nose: Nose normal.   Eyes:      Conjunctiva/sclera: Conjunctivae normal.   Cardiovascular:      Rate and Rhythm: Normal rate and regular rhythm. Pulmonary:      Effort: Pulmonary effort is normal.      Breath sounds: Normal breath sounds. Abdominal:      General: Bowel sounds are normal. There is no distension. Palpations: Abdomen is soft. Tenderness: There is no abdominal tenderness. Musculoskeletal:         General: Normal range of motion. Cervical back: Normal range of motion and neck supple. Lymphadenopathy:      Cervical: No cervical adenopathy. Skin:     General: Skin is warm and dry. Neurological:      Mental Status: She is alert. /82 (BP 1 Location: Right arm, BP Patient Position: Sitting, BP Cuff Size: Adult)   Pulse 82   Temp 98 °F (36.7 °C) (Temporal)   Resp 20   Ht 5' 6\" (1.676 m)   Wt 245 lb 6.4 oz (111.3 kg)   SpO2 98%   BMI 39.61 kg/m²     Allergies   Allergen Reactions    Metformin Nausea Only    Percocet [Oxycodone-Acetaminophen] Other (comments)     \"It makes me feel bad, real bad,like I have the flu. \"       Current Outpatient Medications   Medication Sig    mirabegron (MYRBETRIQ PO) Take  by mouth.  fexofenadine (ALLEGRA) 180 mg tablet Take 1 Tablet by mouth daily.  glipiZIDE (GLUCOTROL) 10 mg tablet Take 1 Tablet by mouth daily (with lunch).     furosemide (LASIX) 20 mg tablet TAKE 1 TABLET BY MOUTH DAILY AS NEEDED FOR LEG SWELLING    metFORMIN (GLUCOPHAGE) 500 mg tablet TAKE 2 TABS BY MOUTH TWO (2) TIMES DAILY (WITH MEALS).  diclofenac EC (VOLTAREN) 50 mg EC tablet Take 1 Tab by mouth two (2) times a day. (Patient taking differently: Take 50 mg by mouth as needed.)    ciclopirox (PENLAC) 8 % solution APPLY TOPICALLY ONCE DAILY    ferrous sulfate (Iron) 325 mg (65 mg iron) tablet Take  by mouth Daily (before breakfast).  cholecalciferol (Vitamin D3) (1000 Units /25 mcg) tablet Take  by mouth daily.  docusate sodium (Stool Softener) 100 mg capsule Take 100 mg by mouth as needed.  multivitamin (DAILY VITAMINS PO) Take  by mouth.  omega 3-DHA-EPA-fish oil (FISH OIL) 1,000 mg (120 mg-180 mg) capsule Take 1 Cap by mouth daily. Indications: high amount of triglyceride in the blood    budesonide-formoterol (SYMBICORT) 80-4.5 mcg/actuation HFAA Take 2 Puffs by inhalation two (2) times a day. (Patient taking differently: Take 2 Puffs by inhalation as needed.)    latanoprost (XALATAN) 0.005 % ophthalmic solution Administer 1 Drop to both eyes nightly.  bisacodyL (DULCOLAX) 5 mg EC tablet Take 5 mg by mouth. As needed (Patient not taking: Reported on 10/13/2021)    Nebulizer Accessories kit Use face mask & tubing for nebulizer machine (Patient not taking: Reported on 10/13/2021)    solifenacin (VESICARE) 5 mg tablet  (Patient not taking: Reported on 10/13/2021)    Nebulizer & Compressor machine Use every 6 hours as needed for shortness of breath (Patient not taking: Reported on 10/13/2021)    montelukast (SINGULAIR) 10 mg tablet Take 1 Tab by mouth daily. (Patient not taking: Reported on 10/13/2021)     No current facility-administered medications for this visit.        Past Medical History:   Diagnosis Date    Adverse effect of anesthesia     WITHIN 20-30 MINUTES AFTER WAKING UP FROM CARPAL TUNNEL SURGERY, HAD DIFFICULTY BREATHING ,     Anemia     Anxiety     Glucose intolerance (impaired glucose tolerance)     Menopause 2018       Past Surgical History:   Procedure Laterality Date    HX CARPAL TUNNEL RELEASE Bilateral     HX  SECTION      HX HYSTERECTOMY  2018    HX LAP CHOLECYSTECTOMY      HX OOPHORECTOMY Bilateral 2018    HX ORTHOPAEDIC Left 2015    SPUR REMOVED TO LEFT FOOT    HX ORTHOPAEDIC Left     SPIN AND SCREW IN LEFT ARM       Social History:  reports that she quit smoking about 21 years ago. She has a 3.75 pack-year smoking history. She has never used smokeless tobacco. She reports current alcohol use of about 1.0 standard drinks of alcohol per week. She reports that she does not use drugs. Patient Care Team:  Vilma Montiel MD as PCP - General (Family Medicine)  Vilma Montiel MD as PCP - [facility]    Problem List  Date Reviewed: 2021        Codes Class Noted    Stridor ICD-10-CM: R06.1  ICD-9-CM: 786.1  2018        Severe obesity ([address] ICD-10-CM: E66.01  ICD-9-CM: 278.01  10/10/2018        SOB (shortness of breath) ICD-10-CM: R06.02  ICD-9-CM: 786.05  10/10/2018        Pain of right hip joint ICD-10-CM: M25.551  ICD-9-CM: 719.45  10/10/2018        Obstructive sleep apnea syndrome ICD-10-CM: G47.33  ICD-9-CM: 327.23  2018        Snoring ICD-10-CM: R06.83  ICD-9-CM: 786.09  2017        Glucose intolerance (impaired glucose tolerance) ICD-10-CM: R73.02  ICD-9-CM: 790.22  Unknown        Anemia ICD-10-CM: D64.9  ICD-9-CM: 285. 9  Unknown                   I ADVISED PATIENT TO GO TO ER IF SYMPTOMS WORSEN , CHANGE OR FAILS TO IMPROVE. I have discussed the diagnosis with the patient and the intended plan as seen in the above orders. The patient has received an after-visit summary and questions were answered concerning future plans. I have discussed medication side effects and warnings with the patient as well. The patient agrees and understands above plan.        An electronic signature was used to authenticate this note.   -- Moses Vásquez MD

## 2021-10-14 ENCOUNTER — VIRTUAL VISIT (OUTPATIENT)
Dept: DIABETES SERVICES | Age: 61
End: 2021-10-14
Payer: MEDICAID

## 2021-10-14 DIAGNOSIS — E11.9 CONTROLLED TYPE 2 DIABETES MELLITUS WITHOUT COMPLICATION, WITHOUT LONG-TERM CURRENT USE OF INSULIN (HCC): Primary | ICD-10-CM

## 2021-10-14 PROCEDURE — G0108 DIAB MANAGE TRN  PER INDIV: HCPCS | Performed by: DIETITIAN, REGISTERED

## 2021-10-14 NOTE — PROGRESS NOTES
[employer] Program for Diabetes Health  Diabetes Self-Management Education & Support Program  Encounter note    SUMMARY  Diabetes self-care management training was completed related to What is Diabetes. The participant will return on October 21 to continue DSMES related to Healthy eating. The participant did not identify SMART Goal(s). EVALUATION:  Participant expressed having a greater understanding of the basic physiologic defect in T2DM and the importance of diabetes self-care practices. Participant expressed understanding of s/s of hyperglycemia and the meaning of A1c after review. Participant shared she has been focusing on making healthier food choices, and shared she eats food based on how it tastes more than her hunger level. Participant shared she is often on-the-go and wants to include this in meal planning. Participant unexpectedly ended visit early d/t having a schedule conflict. RECOMMENDATIONS:  Attend follow-up DSMES visit to focus on healthy eating    Next provider visit is scheduled for: not scheduled       SMART GOAL(S)  Participant did not identify a SMART goal.     DATE DSMES TOPIC EVALUATION     10/14/2021 WHAT IS DIABETES?   a. Role of the normal pancreas in energy balance and blood glucose control   b. The defect seen in diabetes   c. Signs & symptoms of diabetes   d. Diagnosis of diabetes   e. Types of diabetes        The participant knows   Their type of diabetes Yes   The basic physiologic defect Yes     Rk Fernández MS, RDN on 10/14/2021 at 1:35 PM    Total minutes: 31 minutes  Encounter date: 10/14/2021     [facility] for Telehealth:  Jose Luis Nance was evaluated through a synchronous (real-time) audio-video encounter. The patient (or guardian if applicable) is aware that this is a billable service. Verbal consent to proceed has been obtained within the past 12 months.  The visit was conducted pursuant to the emergency declaration under the [address] [place] [address] authority and the [employer] General Act. Patient identification was verified, and a caregiver was present when  appropriate. The patient was located in a state where the provider was credentialed to provide care.

## 2021-10-18 ENCOUNTER — TELEPHONE (OUTPATIENT)
Dept: FAMILY MEDICINE CLINIC | Age: 61
End: 2021-10-18

## 2021-10-19 ENCOUNTER — HOSPITAL ENCOUNTER (OUTPATIENT)
Dept: GENERAL RADIOLOGY | Age: 61
Discharge: HOME | End: 2021-10-19
Payer: MEDICAID

## 2021-10-19 DIAGNOSIS — R05.3 CHRONIC COUGH: ICD-10-CM

## 2021-10-19 PROCEDURE — 71046 X-RAY EXAM CHEST 2 VIEWS: CPT

## 2021-10-21 ENCOUNTER — VIRTUAL VISIT (OUTPATIENT)
Dept: DIABETES SERVICES | Age: 61
End: 2021-10-21
Payer: MEDICAID

## 2021-10-21 DIAGNOSIS — M79.89 LEG SWELLING: ICD-10-CM

## 2021-10-21 DIAGNOSIS — E11.9 CONTROLLED TYPE 2 DIABETES MELLITUS WITHOUT COMPLICATION, WITHOUT LONG-TERM CURRENT USE OF INSULIN (HCC): Primary | ICD-10-CM

## 2021-10-21 PROCEDURE — G0108 DIAB MANAGE TRN  PER INDIV: HCPCS | Performed by: DIETITIAN, REGISTERED

## 2021-10-22 ENCOUNTER — TELEPHONE (OUTPATIENT)
Dept: FAMILY MEDICINE CLINIC | Age: 61
End: 2021-10-22

## 2021-10-22 RX ORDER — FUROSEMIDE 20 MG/1
TABLET ORAL
Qty: 30 TABLET | Refills: 1 | Status: SHIPPED | OUTPATIENT
Start: 2021-10-22 | End: 2021-11-16

## 2021-10-22 NOTE — PROGRESS NOTES
[facility] Program for Diabetes Health  Diabetes Self-Management Education & Support Program  Encounter note    SUMMARY  Diabetes self-care management training was completed related to Healthy eating. The participant will return on October 28 to continue DSMES related to Healthy eating and Monitoring. The participant did not identify SMART Goal(s), and will practice knowledge and skills related to healthy eating to improve diabetes self-management. EVALUATION:  Participant expressed understanding of the roles and sources of CHO, protein, and fats, the value of including nutrient-dense CHO foods, lean proteins, and heart healthy fats, choosing a consistent meal pattern, and using the healthy plate to manage portions. Participant shared she has been focusing on weight management and weighing herself had previously helped to hold her accountable. Participant shared she has been focusing on managing her portions when she eats at restaurants and at home, and she has not been adding salt to foods at home. Participant identified a variety of foods she enjoys and how she could include them in building meals. Participant practiced using the healthy plate to build a balanced breakfast.    RECOMMENDATIONS:  Practice using the healthy plate as a guide in building meals    Next provider visit is scheduled for: not scheduled       SMART GOAL(S)  Participant has not identified a SMART goal.     DATE DSMES TOPIC EVALUATION       10/21/2021   WHAT CAN I EAT?   a. General principles   b. Determining a healthy weight   c. Nutritional terms & tools    Healthy Plate method    Carbohydrate Counting     The participant    Uses Healthy Plate principles in constructing meals Yes    The participant may benefit from: practicing using the healthy plate as a guide in building meals.      Beka Moser MS, RDN on 10/22/2021 at 8:45 AM    Total time spent: 60 minutes  Encounter date: 95/10/5307    [facility] Emergency Adaptations for Telehealth:  Mamadou Gonzales was evaluated through a synchronous (real-time) audio-video encounter. The patient (or guardian if applicable) is aware that this is a billable service. Verbal consent to proceed has been obtained within the past 12 months. The visit was conducted pursuant to the emergency declaration under the [address] [place] [address] and the [employer] and [employer] General Act. Patient identification was verified, and a caregiver was present when  appropriate. The patient was located in a state where the provider was credentialed to provide care.

## 2021-10-28 ENCOUNTER — VIRTUAL VISIT (OUTPATIENT)
Dept: DIABETES SERVICES | Age: 61
End: 2021-10-28
Payer: MEDICAID

## 2021-10-28 ENCOUNTER — TELEPHONE (OUTPATIENT)
Dept: FAMILY MEDICINE CLINIC | Age: 61
End: 2021-10-28

## 2021-10-28 DIAGNOSIS — E11.9 CONTROLLED TYPE 2 DIABETES MELLITUS WITHOUT COMPLICATION, WITHOUT LONG-TERM CURRENT USE OF INSULIN (HCC): Primary | ICD-10-CM

## 2021-10-28 PROCEDURE — G0108 DIAB MANAGE TRN  PER INDIV: HCPCS | Performed by: DIETITIAN, REGISTERED

## 2021-10-29 NOTE — PROGRESS NOTES
[facility] Program for Diabetes Health  Diabetes Self-Management Education & Support Program  Encounter note    SUMMARY  Diabetes self-care management training was completed related to Healthy eating. The participant will return on November 5 to continue DSMES related to Healthy eating. The participant did not identify SMART Goal(s), and will practice knowledge and skills related to healthy eating to improve diabetes self-management. EVALUATION:  Participant expressed understanding of using the healthy plate to manage portions and reading nutrition facts labels for CHO. Participant shared she has been focusing on managing portions and including a variety of foods at dinner. Participant practiced using the healthy plate to adjust typical dinner meals for improved balance. Participant shared she has decreased her sugar-sweetened beverage intake and chooses mostly water. Participant shared she has plans to continue using the healthy plate at meals and read nutrition facts labels for CHO. Participant ended appointment early d/t schedule conflict, and expressed interest in continuing to focus on healthy eating at the next class. RECOMMENDATIONS:  Continue to practice using the healthy plate as a guide when building meals to manage portions and include a variety and balance of foods, practice reading nutrition facts labels for CHO to compare food choices    Next provider visit is scheduled for: not scheduled       SMART GOAL(S)  Participant has not identified a SMART goal.     DATE DSMES TOPIC EVALUATION     10/28/2021   WHAT CAN I EAT?   a.  General principles  c. Nutritional terms & tools    Healthy Plate method    Carbohydrate Counting    Reading food labels     The participant    Uses Healthy Plate principles in constructing meals Yes   Reads food labels in choosing acceptable foods Yes    The participant may benefit from: reading nutrition facts labels for CHO to compare food choices, continue using the healthy plate in building meals. Real Batista MS, RDN on 10/29/2021 at 9:51 AM    Total time spent: 30 minutes  Encounter date: 97/58/1091    [facility] for Telehealth:  Sukhjinder Burdick was evaluated through a synchronous (real-time) audio-video encounter. The patient (or guardian if applicable) is aware that this is a billable service. Verbal consent to proceed has been obtained within the past 12 months. The visit was conducted pursuant to the emergency declaration under the [address] [place] [address], [address] [place] [address] authority and the [employer] and [employer] General Act. Patient identification was verified, and a caregiver was present when  appropriate. The patient was located in a state where the provider was credentialed to provide care.

## 2021-11-05 ENCOUNTER — VIRTUAL VISIT (OUTPATIENT)
Dept: DIABETES SERVICES | Age: 61
End: 2021-11-05
Payer: MEDICAID

## 2021-11-05 DIAGNOSIS — E11.9 CONTROLLED TYPE 2 DIABETES MELLITUS WITHOUT COMPLICATION, WITHOUT LONG-TERM CURRENT USE OF INSULIN (HCC): Primary | ICD-10-CM

## 2021-11-05 PROCEDURE — G0108 DIAB MANAGE TRN  PER INDIV: HCPCS | Performed by: DIETITIAN, REGISTERED

## 2021-11-08 NOTE — PROGRESS NOTES
[facility] Program for Diabetes Health  Diabetes Self-Management Education & Support Program  Encounter note    SUMMARY  Diabetes self-care management training was completed related to Healthy eating. The participant will return on November 17 to continue DSMES related to Monitoring. The participant did identify SMART Goal(s): - Have a protein food with my breakfast daily. , and will practice knowledge and skills related to healthy eating to improve diabetes self-management. EVALUATION:  Participant expressed understanding of using nutrition information when making food choices at restaurants and considerations for alcohol with diabetes. Participant shared she has been reading nutrition facts labels and using the healthy plate at lunch and dinner, and shared breakfast is often flavored oatmeal and sometimes she will have an egg. Participant shared she has been shopping for more fruits and vegetables, and shared these are expensive and have been going bad before she eats them. Participant expressed understanding of storing produce and budget-friendly options like shopping in-season, shopping for store specials, and choosing frozen and canned options that are no-salt-added or low-sodium for vegetables and packaged in water or 100% fruit juice for fruit. Participant shared she has been focusing on making nutritious food choices when on-the-go. RECOMMENDATIONS:  Encouraged participant to practice shopping for budget-friendly options, using nutrition information when making food choices at restaurants, and continuing to use the healthy plate as a guide when building meals. Next provider visit is scheduled for not scheduled in [facility]       SMART GOAL(S)  Participant did not identify a previous SMART goal.     DATE DSMES TOPIC EVALUATION     11/5/2021   WHAT CAN I EAT?   a.  General principles   c. Nutritional terms & tools    Healthy Plate method    Carbohydrate Counting    Reading food labels    Free apps     The participant    Uses Healthy Plate principles in constructing meals Yes   Reads food labels in choosing acceptable foods Yes    The participant may benefit from: shopping for budget-friendly options, using nutrition information when making food choices at restaurants, and continuing to use the healthy plate as a guide when building meals. June Burdick MS, RDN on 11/8/2021 at 8:21 AM    Total time spent: 48 minutes  Encounter date: 96/0/0925    [facility]ations for Telehealth:  Norma Marina was evaluated through a synchronous (real-time) audio-video encounter. The patient (or guardian if applicable) is aware that this is a billable service. Verbal consent to proceed has been obtained within the past 12 months. The visit was conducted pursuant to the emergency declaration under the [address] [place] [address] authority and the [employer] and [employer] Act. Patient identification was verified, and a caregiver was present when  appropriate. The patient was located in a state where the provider was credentialed to provide care.

## 2021-11-15 DIAGNOSIS — M79.89 LEG SWELLING: ICD-10-CM

## 2021-11-16 RX ORDER — FUROSEMIDE 20 MG/1
TABLET ORAL
Qty: 30 TABLET | Refills: 1 | Status: SHIPPED | OUTPATIENT
Start: 2021-11-16 | End: 2021-12-16

## 2021-12-13 ENCOUNTER — VIRTUAL VISIT (OUTPATIENT)
Dept: SLEEP MEDICINE | Age: 61
End: 2021-12-13
Payer: MEDICAID

## 2021-12-13 ENCOUNTER — DOCUMENTATION ONLY (OUTPATIENT)
Dept: SLEEP MEDICINE | Age: 61
End: 2021-12-13

## 2021-12-13 DIAGNOSIS — E11.9 CONTROLLED TYPE 2 DIABETES MELLITUS WITHOUT COMPLICATION, WITHOUT LONG-TERM CURRENT USE OF INSULIN (HCC): ICD-10-CM

## 2021-12-13 DIAGNOSIS — M79.89 LEG SWELLING: ICD-10-CM

## 2021-12-13 DIAGNOSIS — G47.33 OBSTRUCTIVE SLEEP APNEA (ADULT) (PEDIATRIC): Primary | ICD-10-CM

## 2021-12-13 PROCEDURE — 99442 PR PHYS/QHP TELEPHONE EVALUATION 11-20 MIN: CPT | Performed by: NURSE PRACTITIONER

## 2021-12-13 NOTE — PROGRESS NOTES
Aruna Dumas (: 1960) is a 63 y.o. female, established patient, seen for positive airway pressure follow-up, she was last seen by me on 2020, previously seen by Dr. New Morrison 2020, prior notes reviewed in detail.  In lab sleep test 2020 showed AHI of 178/hr with a lowest SaO2 of 78%, duration of SaO2 < 88% 4.6 min. ASSESSMENT/PLAN:    ICD-10-CM ICD-9-CM    1. Obstructive sleep apnea (adult) (pediatric)  G47.33 327.23 AMB SUPPLY ORDER   2. Controlled type 2 diabetes mellitus without complication, without long-term current use of insulin ([facility])  E11.9 250.00    3. Adult BMI 39.0-39.9 kg/sq m  Z68.39 V85.39        AHI = 17.8(2020). On APAP, Respironics :  5-12 cmH2O. Set up 2020. she is not compliant with PAP therapy although when used PAP shows benefit to the patient and remains necessary for control of her sleep apnea. Her device is affected by the Fern recall, review of  Care  shows no new device set up at this time. Follow-up and Dispositions    · Return in about 3 months (around 3/13/2022) for follow up on recalled device. 1. Sleep Apnea -    Continue on current pressures. We have discussed the Fern Respironics device recall, the need to register the device with Fern, and I have advised positional therapy if PAP therapy is stopped. Her device shows registered in the Fern system. * Supplies ordered - full face mask and heated tubing    Orders Placed This Encounter    AMB SUPPLY ORDER     Diagnosis: (G47.33) MING (obstructive sleep apnea)  (primary encounter diagnosis)     Replacement Supplies for Positive Airway Pressure Therapy Device:   Duration of need: 99 months.  Full Face Mask 1 every 3 months.  Full Face Mask Cushion 1 per month.  Headgear 1 every 6 months.  Pos Airway pressure chin strap     Tubing with heating element 1 every 3 months.  Filter(s) Disposable 2 per month.    Filter(s) Non-Disposable 1 every 6 months. .   [facility] for Humidifier (Replace) 1 every 6 months. TERENCE ZapataEMELY-BC; NPI: [id number]    Electronically signed. Date:- 12/13/21       *  Counseling was provided regarding the importance of regular PAP use with emphasis on ensuring sufficient total sleep time, proper sleep hygiene, and safe driving. * Re-enforced proper and regular cleaning for the device. * She was asked to contact our office for any problems regarding PAP therapy. 2. Type II diabetes -  she continues on her current regimen. hse notes she has added a new medication and A1C is down from high of 11 to 6.5    3. Encouraged continued weight management program through appropriate diet and exercise regimen as significant weight reduction has been shown to reduce severity of obstructive sleep apnea. She is doing nightly stretching and some cardio exercises before bed. SUBJECTIVE/OBJECTIVE:    She  is seen today for follow up on PAP device and reports  problems using the device. The following concerns reviewed:    Drowsiness no Problems exhaling no   Snoring no Forget to put on no   Mask Comfortable yes Can't fall asleep no   Dry Mouth no Mask falls off no   Air Leaking no Frequent awakenings no       She admits that her sleep had improved on PAP therapy using full face mask and heated tubing but she has having foam related symptoms and stopped using device when she learned of the recall. She has been having sinus infections and headaches. She does not want to resume use of recalled PAP device. She reports she has been more tired since stopping using device. Review of device download indicated:  Auto pressure: 5-12 cmH2O; Peak Avg Pressure: 11.6 cmH2O;  Avg. Device Pressure <= 90 %: 9.8 cmH2O    Average % Night in Large Leak:  2.1  % Used Days >= 4 hours: 52. Avg hours used:  5:28.     Therapy Apnea Index averaged over PAP use: 1.2 /hr which reflects improved sleep breathing condition. [place] Sleepiness Score: 8 and Modified F.O.S.Q. Score Total / 2: 18.5 which reflects improved sleep quality over therapy time. Sleep Review of Systems: notable for Negative difficulty falling asleep; Positive awakenings at night; Negative early morning headaches; Negative memory problems; Negative concentration issues; Negative chest pain; Negative shortness of breath; Negative significant joint pain at night; Negative rashes or itching; Negative heartburn; Negative significant mood issues; 0 afternoon naps per week    Vitals reported by patient   Patient-Reported Vitals 12/13/2021   Patient-Reported Weight 245lb   Patient-Reported Pulse 80   Patient-Reported Temperature 94.2   Patient-Reported SpO2 98   Patient-Reported Systolic  745   Patient-Reported Diastolic 80      Calculated BMI 39    Physical Exam not completed due to audio only visit. Pursuant to the emergency declaration under the [facility], [address] [place] [address] and the [employer] and [employer] Act, this telephone encounter was conducted with patient's (and/or legal guardian's) consent, to reduce the risk of exposure to COVID-19 and provide necessary medical care. Services were provided through a telephone call to substitute for in-person encounter. I was in the office while conducting this encounter, patient located at their home or alternate location of their choice. Patient identification was verified at the start of the visit: YES using name and date of birth. Patient's phone number [phone] (cell) was confirmed for accuracy. She gives permission for messages regarding results and appointments to be left at that number.     On this date 12/13/21 I have spent 20 minutes reviewing previous notes, test results, and on an audio only visit with the patient discussing the diagnosis and importance of compliance with the treatment plan as well as documenting on the day of the visit. An electronic signature was used to authenticate this note.     -- Kayla Ta NP, [facility]  12/13/21

## 2021-12-13 NOTE — PATIENT INSTRUCTIONS
[address] [facility] [place]. [place], [address]  Tel.  [phone]  Fax. [address] [facility] [address]  [place], [address] [facility]  Tel.  [phone]  Fax. [phone] [address]  Tel.  [phone]  Fax. [phone]     Learning About CPAP for Sleep Apnea  What is CPAP? CPAP is a small machine that you use at home every night while you sleep. It increases air pressure in your throat to keep your airway open. When you have sleep apnea, this can help you sleep better so you feel much better. CPAP stands for \"continuous positive airway pressure. \"  The CPAP machine will have one of the following:  · A mask that covers your nose and mouth  · Prongs that fit into your nose  · A mask that covers your nose only, the most common type. This type is called NCPAP. The N stands for \"nasal.\"  Why is it done? CPAP is usually the best treatment for obstructive sleep apnea. It is the first treatment choice and the most widely used. Your doctor may suggest CPAP if you have:  · Moderate to severe sleep apnea. · Sleep apnea and coronary artery disease (CAD) or heart failure. How does it help? · CPAP can help you have more normal sleep, so you feel less sleepy and more alert during the daytime. · CPAP may help keep heart failure or other heart problems from getting worse. · NCPAP may help lower your blood pressure. · If you use CPAP, your bed partner may also sleep better because you are not snoring or restless. What are the side effects? Some people who use CPAP have:  · A dry or stuffy nose and a sore throat. · Irritated skin on the face. · Sore eyes. · Bloating. If you have any of these problems, work with your doctor to fix them. Here are some things you can try:  · Be sure the mask or nasal prongs fit well. · See if your doctor can adjust the pressure of your CPAP. · If your nose is dry, try a humidifier.   · If your nose is runny or stuffy, try decongestant medicine or a steroid nasal spray. If these things do not help, you might try a different type of machine. Some machines have air pressure that adjusts on its own. Others have air pressures that are different when you breathe in than when you breathe out. This may reduce discomfort caused by too much pressure in your nose. Where can you learn more? Go to Ruzuku.be  Enter Alton Allenid in the search box to learn more about \"Learning About CPAP for Sleep Apnea. \"   © 6978-8612 Healthwise, Incorporated. Care instructions adapted under license by [facility] [employer] (which disclaims liability or warranty for this information). This care instruction is for use with your licensed healthcare professional. If you have questions about a medical condition or this instruction, always ask your healthcare professional. [address] any warranty or liability for your use of this information. Content Version: 3.2.61551; Last Revised: January 11, 2010  PROPER SLEEP HYGIENE    What to avoid  · Do not have drinks with caffeine, such as coffee or black tea, for 8 hours before bed. · Do not smoke or use other types of tobacco near bedtime. Nicotine is a stimulant and can keep you awake. · Avoid drinking alcohol late in the evening, because it can cause you to wake in the middle of the night. · Do not eat a big meal close to bedtime. If you are hungry, eat a light snack. · Do not drink a lot of water close to bedtime, because the need to urinate may wake you up during the night. · Do not read or watch TV in bed. Use the bed only for sleeping and sexual activity. What to try  · Go to bed at the same time every night, and wake up at the same time every morning. Do not take naps during the day. · Keep your bedroom quiet, dark, and cool. · Get regular exercise, but not within 3 to 4 hours of your bedtime. .  · Sleep on a comfortable pillow and mattress.   · If watching the clock makes you anxious, turn it facing away from you so you cannot see the time. · If you worry when you lie down, start a worry book. Well before bedtime, write down your worries, and then set the book and your concerns aside. · Try meditation or other relaxation techniques before you go to bed. · If you cannot fall asleep, get up and go to another room until you feel sleepy. Do something relaxing. Repeat your bedtime routine before you go to bed again. · Make your house quiet and calm about an hour before bedtime. Turn down the lights, turn off the TV, log off the computer, and turn down the volume on music. This can help you relax after a busy day. Drowsy Driving: The [employer] cites drowsiness as a causing factor in more than 522,450 police reported crashes annually, resulting in 76,000 injuries and 1,500 deaths. Other surveys suggest 55% of people polled have driven while drowsy in the past year, 23% had fallen asleep but not crashed, 3% crashed, and 2% had and accident due to drowsy driving. Who is at risk? Young Drivers: One study of drowsy driving accidents states that 55% of the drivers were under 25 years. Of those, 75% were male. Shift Workers and Travelers: People who work overnight or travel across time zones frequently are at higher risk of experiencing Circadian Rhythm Disorders. They are trying to work and function when their body is programed to sleep. Sleep Deprived: Lack of sleep has a serious impact on your ability to pay attention or focus on a task. Consistently getting less than the average of 8 hours your body needs creates partial or cumulative sleep deprivation. Untreated Sleep Disorders: Sleep Apnea, Narcolepsy, R.L.S., and other sleep disorders (untreated) prevent a person from getting enough restful sleep. This leads to excessive daytime sleepiness and increases the risk for drowsy driving accidents by up to 7 times.   Medications / Alcohol: Even over the counter medications can cause drowsiness. Medications that impair a drivers attention should have a warning label. Alcohol naturally makes you sleepy and on its own can cause accidents. Combined with excessive drowsiness its effects are amplified. Signs of Drowsy Driving:   * You don't remember driving the last few miles   * You may drift out of your sacha   * You are unable to focus and your thoughts wander   * You may yawn more often than normal   * You have difficulty keeping your eyes open / nodding off   * Missing traffic signs, speeding, or tailgating  Prevention-   Good sleep hygiene, lifestyle and behavioral choices have the most impact on drowsy driving. There is no substitute for sleep and the average person requires 8 hours nightly. If you find yourself driving drowsy, stop and sleep. Consider the sleep hygiene tips provided during your visit as well. Medication Refill Policy: Refills for all medications require 1 week advance notice. Please have your pharmacy fax a refill request. We are unable to fax, or call in \"controled substance\" medications and you will need to pick these prescriptions up from our office. CareSpotter Activation    Thank you for requesting access to CareSpotter. Please follow the instructions below to securely access and download your online medical record. CareSpotter allows you to send messages to your doctor, view your test results, renew your prescriptions, schedule appointments, and more. How Do I Sign Up? 1. In your internet browser, go to [email or web address]. 2. Click on the First Time User? Click Here link in the Sign In box. You will see the New Member Sign Up page. 3. Enter your CareSpotter Access Code exactly as it appears below. You will not need to use this code after youve completed the sign-up process. If you do not sign up before the expiration date, you must request a new code. CareSpotter Access Code:  Activation code not generated  Current Distractify Status: Active (This is the date your Distractify access code will )    4. Enter the last four digits of your Social Security Number (xxxx) and Date of Birth (mm/dd/yyyy) as indicated and click Submit. You will be taken to the next sign-up page. 5. Create a [employer] ID. This will be your [employer] login ID and cannot be changed, so think of one that is secure and easy to remember. 6. Create a Distractify password. You can change your password at any time. 7. Enter your Password Reset Question and Answer. This can be used at a later time if you forget your password. 8. Enter your e-mail address. You will receive e-mail notification when new information is available in [address] 9. Click Sign Up. You can now view and download portions of your medical record. 10. Click the Download Summary menu link to download a portable copy of your medical information. Additional Information    If you have questions, please call [phone]. Remember, Distractify is NOT to be used for urgent needs. For medical emergencies, dial 911.

## 2021-12-16 RX ORDER — FUROSEMIDE 20 MG/1
TABLET ORAL
Qty: 30 TABLET | Refills: 1 | Status: SHIPPED | OUTPATIENT
Start: 2021-12-16 | End: 2022-01-12

## 2022-01-07 ENCOUNTER — VIRTUAL VISIT (OUTPATIENT)
Dept: DIABETES SERVICES | Age: 62
End: 2022-01-07
Payer: MEDICAID

## 2022-01-07 DIAGNOSIS — E11.9 CONTROLLED TYPE 2 DIABETES MELLITUS WITHOUT COMPLICATION, WITHOUT LONG-TERM CURRENT USE OF INSULIN (HCC): Primary | ICD-10-CM

## 2022-01-07 PROCEDURE — G0108 DIAB MANAGE TRN  PER INDIV: HCPCS | Performed by: DIETITIAN, REGISTERED

## 2022-01-07 NOTE — PROGRESS NOTES
[facility] Program for Diabetes Health  Diabetes Self-Management Education & Support Program  Encounter note    SUMMARY  Diabetes self-care management training was completed related to Monitoring. The participant will return on January 21 to continue DSMES related to Physical activity and Taking medications. The participant did not identify SMART Goal(s): - and plans to continue previous goal, and will practice knowledge and skills related to monitoring to improve diabetes self-management. EVALUATION:  Participant expressed understanding of the value of monitoring blood glucose, ADA blood glucose targets, and using her glucometer. Participant shared she monitors her blood glucose 3-4x/day and identified how her readings related to targets. Participant shared she currently pays out-of-pocket for her BG monitoring supplies and reported them being expensive, and plans to follow up with her provider and look into lower cost supplies. Participant shared she has also been logging her readings to share with her provider. Participant shared she has been focusing on choosing chips and sweets less often, and has been focusing on choosing lean proteins and vegetarian meals. Participant shared she has also been reading nutrition facts labels more closely, utilizing her local food bank, and shopping for no-salt-added canned vegetables and frozen vegetables. RECOMMENDATIONS:  Encouraged participant to follow up with provider on BG monitoring supplies and explore lower-cost options if needed, continue sharing readings with provider, continue practicing strategies for budget friendly meals, continue reading nutrition facts labels to make more informed food choices    Next provider visit is scheduled for 1/20/22       SMART GOAL(S)  1. Have a protein food with my breakfast daily.  (Goal set 11/05/21)  ACHIEVEMENT OF GOAL(S)  [] 0-24%     [] 25-49%     [] 50-74%     [x] % (Goal met 1/07/22)     DATE DSMES TOPIC EVALUATION 1/7/2022 HOW CAN BLOOD GLUCOSE MONITORING HELP ME?   a. Value of blood glucose monitoring   b. Realistic expectations   c. Blood glucose monitoring targets   d. Target adjustments   e. Setting a1c & blood glucose targets with provider   f. Meter selection    g. Technique for obtaining blood droplet   h. Blood glucose testing sites   i. Determining best times to test  k. Data sharing with provider        The participant    Can demonstrate their glucometer procedure Yes   Logs their BG readings Yes    The participant may benefit from: following up with provider on BG monitoring supplies and exploring lower-cost options if needed. Austen Estrella MS, RDN on 1/7/2022 at 1:00 PM    I have personally reviewed the health record, including provider notes, laboratory data and current medications before making these care and education recommendations. Total minutes: 60 minutes  Encounter date: 4/8/5433    Joshua Macias Emergency Adaptations for Telehealth:  Magalis White was evaluated through a synchronous (real-time) audio-video encounter. The patient (or guardian if applicable) is aware that this is a billable service. Verbal consent to proceed has been obtained within the past 12 months. The visit was conducted pursuant to the emergency declaration under the [address] [place] [address], [address] [place] [address] authority and the [employer] and [employer] Act. Patient identification was verified, and a caregiver was present when  appropriate. The patient was located in a state where the provider was credentialed to provide care.

## 2022-01-11 DIAGNOSIS — M79.89 LEG SWELLING: ICD-10-CM

## 2022-01-12 RX ORDER — FUROSEMIDE 20 MG/1
TABLET ORAL
Qty: 30 TABLET | Refills: 1 | Status: SHIPPED | OUTPATIENT
Start: 2022-01-12 | End: 2022-02-14

## 2022-01-19 DIAGNOSIS — E11.9 CONTROLLED TYPE 2 DIABETES MELLITUS WITHOUT COMPLICATION, WITHOUT LONG-TERM CURRENT USE OF INSULIN (HCC): Primary | ICD-10-CM

## 2022-01-21 ENCOUNTER — VIRTUAL VISIT (OUTPATIENT)
Dept: DIABETES SERVICES | Age: 62
End: 2022-01-21
Payer: MEDICAID

## 2022-01-21 DIAGNOSIS — E11.9 CONTROLLED TYPE 2 DIABETES MELLITUS WITHOUT COMPLICATION, WITHOUT LONG-TERM CURRENT USE OF INSULIN (HCC): Primary | ICD-10-CM

## 2022-01-21 PROCEDURE — G0108 DIAB MANAGE TRN  PER INDIV: HCPCS | Performed by: DIETITIAN, REGISTERED

## 2022-01-21 NOTE — PROGRESS NOTES
[employer] Program for Diabetes Health  Diabetes Self-Management Education & Support Program  Encounter note    SUMMARY  Diabetes self-care management training was completed related to Taking medications and Problem solving. The participant will return on January 27 to continue DSMES related to Physical activity. The participant did not identify SMART Goal(s): - and plans to continue previous goal, and will practice knowledge and skills related to healthy eating to improve diabetes self-management. EVALUATION:  Participant expressed understanding of the expected action and side effects of glipizide and metformin, shared she takes her diabetes medications as prescribed, and she has found it helpful to use a pill box in remembering to take her medications. Participant shared she experienced hypoglycemia a few times last week when she was sick, and she was consuming balanced meals consistently throughout the day. Participant shared she has also experienced hypoglycemia at other times when she skips a meal or does not finish a meal, and also when she is eating balanced meals consistently throughout the day. Participant shared she has plans to talk with her provider about experiencing hypoglycemia, and plans to continue practicing strategies for a consistent eating pattern. Participant expressed understanding of hypoglycemia s/s, prevention, and treatment and sick day management. Participant shared her  has been a strong support person for her with her diabetes self-management, and has helped encourage her to take her diabetes medication as prescribed, use the healthy plate regularly, and practice diabetes self-management on sick days.     RECOMMENDATIONS:  Encouraged participant to communicate with provider about experiencing hypoglycemia, use the rule of 15 for treating hypoglycemia, practice strategies for a consistent eating pattern, continue focusing on choosing balanced meals using the healthy plate as a guide    Next provider visit is scheduled for: not scheduled in [employer] GOAL(S)  1. Have a protein food with my breakfast daily. (Goal set 11/05/21)  ACHIEVEMENT OF GOAL(S)  [] 0-24%     [] 25-49%     [] 50-74%     [x] % (Goal met 1/07/22, 1/21/22)     DATE DSMES TOPIC EVALUATION     1/21/2022 HOW DO MY DIABETES MEDICATIONS WORK?   b. Type 2 medications    Oral agents   c. Hypoglycemia symptoms & treatment    Glucagon emergency kits   d. General guidance   Traveling with medications   e. Barriers to medication adherence      The participant    Can describe the expected action & side effects of prescribed diabetes medications Yes. The participant plans to address: communicating with provider about experiencing hypoglycemia. DATE DSMES TOPIC EVALUATION     1/21/2022 HOW DO I FIGURE OUT WHAT'S INFLUENCING MY BLOOD GLUCOSES?   b. Common problems in diabetes self-care    Hypoglycemia     Sick days       The participant has an action plan for    Hypoglycemia Yes   Sick days Yes    The participant may benefit from: continue focusing on choosing a consistent eating pattern and balanced meals, using the rule of 15 for treating hypoglycemia, communicating with provider about sick days and experiencing hypoglycemia. Austin López MS, RDN on 1/21/2022 at 9:26 AM    I have personally reviewed the health record, including provider notes, laboratory data and current medications before making these care and education recommendations. Total minutes: 64 minutes  Encounter date: 5/90/3527    Joshua Macias Emergency Adaptations for Telehealth:  Hermilo Pettit, was evaluated through a synchronous (real-time) audio-video encounter. The patient (or guardian if applicable) is aware that this is a billable service, which includes applicable co-pays. This Virtual Visit was conducted with patient's (and/or legal guardian's) consent.  The visit was conducted pursuant to the emergency declaration under the [address] [place] [address], [address] [place] [address] waiver authority and the [employer] and [employer] Act. Patient identification was verified, and a caregiver was present when appropriate. The patient was located in a state where the provider was licensed to provide care.

## 2022-01-27 ENCOUNTER — VIRTUAL VISIT (OUTPATIENT)
Dept: DIABETES SERVICES | Age: 62
End: 2022-01-27
Payer: MEDICAID

## 2022-01-27 DIAGNOSIS — E11.9 CONTROLLED TYPE 2 DIABETES MELLITUS WITHOUT COMPLICATION, WITHOUT LONG-TERM CURRENT USE OF INSULIN (HCC): Primary | ICD-10-CM

## 2022-01-27 PROCEDURE — G0108 DIAB MANAGE TRN  PER INDIV: HCPCS | Performed by: DIETITIAN, REGISTERED

## 2022-01-27 NOTE — PROGRESS NOTES
[facility] Program for Diabetes Health  Diabetes Self-Management Education & Support Program  Encounter note    SUMMARY  Diabetes self-care management training was completed related to Physical activity and Healthy coping. The participant will return on February 10 to continue DSMES related to Healthy coping. The participant did not identify SMART Goal(s): - and plans to continue previous goal, and will practice knowledge and skills related to healthy eating to improve diabetes self-management. EVALUATION:  Participant shared she has been engaging in physical activity 2 days/week through chair exercises, stretching videos, dancing, and squatting, identified barriers to engaging in regular physical activity include her work schedule and cold weather, and shared she has found it helpful to do indoor exercises and schedule it as part of her day in the evenings. Participant shared she feels supported by her fiance, children, and friends relating to her diabetes health, and she has been focusing on what is within her control with her diabetes self-management. RECOMMENDATIONS:  Encouraged participant to continue identifying strategies to overcome barriers to engaging in regular physical activity    Next provider visit is scheduled for: not scheduled in [facility]       SMART GOAL(S)  1. Have a protein food with my breakfast daily. (Goal set 11/05/21)  ACHIEVEMENT OF GOAL(S)  [] 0-24%     [] 25-49%     [] 50-74%     [x] % (Goal met 1/07/22, 1/21/22, 1/27/22)     DATE DSMES TOPIC EVALUATION     1/27/2022 HOW DOES PHYSICAL ACTIVITY AFFECT MY DIABETES?   a. Benefits of physical activity   b. Beginning a program of physical activity    Walking    Pedometers    Goal setting   c. Structured physical activity program    Aerobic activity    Resistance    Flexibility    Balance   d. Physical activity program progression   e. Safety issues   f. Barriers to physical activity   g.  Facilitators of physical activity The participant has established a regular physical activity plan Yes    The participant may benefit from: continuing to identify strategies to overcome barriers to engaging in regular physical activity. DATE DSMES TOPIC EVALUATION     1/27/2022 HOW DO I FIND SUPPORT TO TACKLE THIS CONDITION?   a. Normal responses to diabetes diagnosis or complication    Shock    Anger & resentment    Guilt/self-blame    Sadness & worry    Depression     Anxiety   b. Constructive strategies to normal responses     Exploring feelings & attitudes    Obtaining support: Communicating   i. Family & friends        The participant can identify people that support them in caring for their diabetes health:  Yes, participant identified her fiance, children, and friends. Chris Mcguire MS, RDN on 1/27/2022 at 12:42 PM    I have personally reviewed the health record, including provider notes, laboratory data and current medications before making these care and education recommendations. Total minutes: 63 minutes  Encounter date: 9/79/1163    Joshua Macias [employer] for Telehealth:  Brain Keene, was evaluated through a synchronous (real-time) audio-video encounter. The patient (or guardian if applicable) is aware that this is a billable service, which includes applicable co-pays. This Virtual Visit was conducted with patient's (and/or legal guardian's) consent. The visit was conducted pursuant to the emergency declaration under the [address] [place] [address], [address] [place] [address] waiver authority and the [employer] and [employer] General Act. Patient identification was verified, and a caregiver was present when appropriate. The patient was located in a state where the provider was licensed to provide care.

## 2022-02-10 ENCOUNTER — VIRTUAL VISIT (OUTPATIENT)
Dept: DIABETES SERVICES | Age: 62
End: 2022-02-10
Payer: MEDICAID

## 2022-02-10 DIAGNOSIS — E11.9 CONTROLLED TYPE 2 DIABETES MELLITUS WITHOUT COMPLICATION, WITHOUT LONG-TERM CURRENT USE OF INSULIN (HCC): Primary | ICD-10-CM

## 2022-02-10 DIAGNOSIS — M79.89 LEG SWELLING: ICD-10-CM

## 2022-02-10 PROCEDURE — G0108 DIAB MANAGE TRN  PER INDIV: HCPCS | Performed by: DIETITIAN, REGISTERED

## 2022-02-10 NOTE — PROGRESS NOTES
[facility] Program for Diabetes Health  Diabetes Self-Management Education & Support Program  Encounter note    SUMMARY  Diabetes self-care management training was completed related to Reducing risks, Healthy coping and Problem solving. The participant will return on February 17 to continue DSMES related to Reducing risks. The participant did identify SMART Goal(s): - Engage in physical activity for 15 minutes 2 days/week. , and will practice knowledge and skills related to being active to improve diabetes self-management. EVALUATION:  Participant shared she communicates with her provider about diabetes self-management and plans to continue reading diabetes magazines as part of her diabetes self-management support plan. Participant shared she prays, focuses on what is within her control, and listens to music as stress management techniques. Participant expressed understanding of using problem solving strategies to overcome barriers to diabetes self-management practices, hyperglycemia s/s and prevention, and disaster preparedness. Participant expressed understanding of care for eye and dental health, shared she has not seen a dentist in two years d/t the office being closed and has plans to schedule an appointment. Participant shared she wants to be more consistent with engaging in physical activity, shared she talked with her provider about appropriate exercises and they recommended chair exercises and stretches, and she wants to continue focusing on this. RECOMMENDATIONS:  Encouraged participant to continue reading diabetes magazines as part of diabetes self-management support plan    Next provider visit is scheduled for: not scheduled       SMART GOAL(S)  1. Have a protein food with my breakfast daily.  (Goal set 11/05/21)  ACHIEVEMENT OF GOAL(S)  [] 0-24%     [] 25-49%     [] 50-74%     [x] % (Goal met 1/07/22, 1/21/22, 1/27/22, 2/10/22)     DATE DSMES TOPIC EVALUATION     2/10/2022 HOW DO I STAY HEALTHY?   a. Prevention    Vaccinations   b. Examinations    Eye     Dental     The participant has a personal diabetes care record to keep abreast of diabetes health Yes, participant shared she uses her patient portal and a journal to keep track of her diabetes health. DATE DSMES TOPIC EVALUATION     2/10/2022 HOW DO I FIND SUPPORT TO TACKLE THIS CONDITION? b. Constructive strategies to normal responses     Motivation: Cost versus benefits analysis    Problem-solving: Chain analysis    Obtaining support: Communicating   ii. Health team   iii. Community   c. Stress    Symptoms    Managing stress    Fill your tool kit            The participant would like to pursue the following in keeping themselves healthy after completing the program:  Participant identified reading diabetes magazines. The participant may benefit from: utilizing diabetes self-management support plan after completing program.     DATE DSMES TOPIC EVALUATION     2/10/2022 HOW DO I FIGURE OUT WHAT'S INFLUENCING MY BLOOD GLUCOSES?   a. Problem solving using SOAR    Set goals    Sort options    Arrive at a plan    Reaffirm plan   b. Common problems in diabetes self-care    Hyperglycemia   c. Pattern management   d. Disaster preparedness plan        The participant has an action plan for    Hyperglycemia Yes   During disasters Yes    The participant may benefit from: identifying patterns on blood glucose log and relationship to self-care practices. Monika Nascimento MS, RDN on 2/10/2022 at 3:55 PM    I have personally reviewed the health record, including provider notes, laboratory data and current medications before making these care and education recommendations. Total minutes: 67 minutes  Encounter date: 4/59/2804    Joshua Macias Emergency [employer]ations for Telehealth:  Yaritza Jin, was evaluated through a synchronous (real-time) audio-video encounter.  The patient (or guardian if applicable) is aware that this is a billable service, which includes applicable co-pays. This Virtual Visit was conducted with patient's (and/or legal guardian's) consent. The visit was conducted pursuant to the emergency declaration under the [address] [place] [address], [address] [place] [address] authority and the [employer] General Act. Patient identification was verified, and a caregiver was present when appropriate. The patient was located in a state where the provider was licensed to provide care.

## 2022-02-14 RX ORDER — FUROSEMIDE 20 MG/1
TABLET ORAL
Qty: 30 TABLET | Refills: 1 | Status: SHIPPED | OUTPATIENT
Start: 2022-02-14 | End: 2022-03-14

## 2022-03-13 DIAGNOSIS — M79.89 LEG SWELLING: ICD-10-CM

## 2022-03-13 DIAGNOSIS — E11.9 CONTROLLED TYPE 2 DIABETES MELLITUS WITHOUT COMPLICATION, WITHOUT LONG-TERM CURRENT USE OF INSULIN (HCC): ICD-10-CM

## 2022-03-14 RX ORDER — METFORMIN HYDROCHLORIDE 500 MG/1
1000 TABLET ORAL 2 TIMES DAILY WITH MEALS
Qty: 360 TABLET | Refills: 1 | Status: SHIPPED | OUTPATIENT
Start: 2022-03-14 | End: 2022-08-09 | Stop reason: ALTCHOICE

## 2022-03-14 RX ORDER — FUROSEMIDE 20 MG/1
TABLET ORAL
Qty: 30 TABLET | Refills: 1 | Status: SHIPPED | OUTPATIENT
Start: 2022-03-14 | End: 2022-04-06

## 2022-03-18 PROBLEM — R06.02 SOB (SHORTNESS OF BREATH): Status: ACTIVE | Noted: 2018-10-10

## 2022-03-18 PROBLEM — R06.83 SNORING: Status: ACTIVE | Noted: 2017-11-29

## 2022-03-19 PROBLEM — R06.1 STRIDOR: Status: ACTIVE | Noted: 2018-11-28

## 2022-03-19 PROBLEM — G47.33 OBSTRUCTIVE SLEEP APNEA SYNDROME: Status: ACTIVE | Noted: 2018-02-02

## 2022-03-19 PROBLEM — E66.01 SEVERE OBESITY (HCC): Status: ACTIVE | Noted: 2018-10-10

## 2022-03-19 PROBLEM — M25.551 PAIN OF RIGHT HIP JOINT: Status: ACTIVE | Noted: 2018-10-10

## 2022-04-05 DIAGNOSIS — M79.89 LEG SWELLING: ICD-10-CM

## 2022-04-06 RX ORDER — FUROSEMIDE 20 MG/1
TABLET ORAL
Qty: 30 TABLET | Refills: 1 | Status: SHIPPED | OUTPATIENT
Start: 2022-04-06 | End: 2022-04-25 | Stop reason: SDUPTHER

## 2022-04-25 DIAGNOSIS — M79.89 LEG SWELLING: ICD-10-CM

## 2022-04-25 RX ORDER — FUROSEMIDE 20 MG/1
TABLET ORAL
Qty: 30 TABLET | Refills: 1 | Status: SHIPPED | OUTPATIENT
Start: 2022-04-25 | End: 2022-04-27 | Stop reason: SDUPTHER

## 2022-04-25 NOTE — TELEPHONE ENCOUNTER
PCP: Vineet Ling MD    Last appt: 10/13/2021  Future Appointments   Date Time Provider Kassy Mcdaniel   5/4/2022 10:40 AM Vineet Ling MD [facility] AMB       Requested Prescriptions     Pending Prescriptions Disp Refills    furosemide (LASIX) 20 mg tablet 30 Tablet 1       Prior labs and Blood pressures:  BP Readings from Last 3 Encounters:   10/13/21 130/82   07/08/21 132/85   05/20/21 131/82     Lab Results   Component Value Date/Time    Sodium 139 10/20/2021 09:04 AM    Potassium 4.1 10/20/2021 09:04 AM    Chloride 108 10/20/2021 09:04 AM    CO2 25 10/20/2021 09:04 AM    Anion gap 6 10/20/2021 09:04 AM    Glucose 167 (H) 10/20/2021 09:04 AM    BUN 13 10/20/2021 09:04 AM    Creatinine 0.77 10/20/2021 09:04 AM    BUN/Creatinine ratio 17 10/20/2021 09:04 AM    GFR est AA >60 10/20/2021 09:04 AM    GFR est non-AA >60 10/20/2021 09:04 AM    Calcium 9.6 10/20/2021 09:04 AM     Lab Results   Component Value Date/Time    Hemoglobin A1c 6.5 (H) 10/20/2021 09:04 AM    Hemoglobin A1c (POC) 5.9 (A) 10/10/2018 04:04 PM     Lab Results   Component Value Date/Time    Cholesterol, total 189 10/20/2021 09:04 AM    Cholesterol (POC) 173 05/31/2017 02:21 PM    HDL Cholesterol 63 10/20/2021 09:04 AM    HDL Cholesterol (POC) 66 05/31/2017 02:21 PM    LDL Cholesterol (POC) 85 05/31/2017 02:21 PM    LDL, calculated 93.8 10/20/2021 09:04 AM    VLDL, calculated 32.2 10/20/2021 09:04 AM    Triglyceride 161 (H) 10/20/2021 09:04 AM    Triglycerides (POC) 113 05/31/2017 02:21 PM    CHOL/HDL Ratio 3.0 10/20/2021 09:04 AM     Lab Results   Component Value Date/Time    Vitamin D 25-Hydroxy 25.2 (L) 10/06/2020 10:52 AM       Lab Results   Component Value Date/Time    TSH 1.300 05/20/2021 09:19 AM    TSH 1.00 12/19/2019 11:15 AM

## 2022-04-27 DIAGNOSIS — M79.89 LEG SWELLING: ICD-10-CM

## 2022-04-27 RX ORDER — FUROSEMIDE 20 MG/1
TABLET ORAL
Qty: 30 TABLET | Refills: 1 | Status: SHIPPED | OUTPATIENT
Start: 2022-04-27 | End: 2022-05-06 | Stop reason: ALTCHOICE

## 2022-04-27 NOTE — TELEPHONE ENCOUNTER
Please advice! !    .  PCP: Triston Shine MD    Last appt: 10/13/2021  Future Appointments   Date Time Provider Kassy Mcdaniel   5/4/2022 10:40 AM Triston Shine MD [facility]       Requested Prescriptions      No prescriptions requested or ordered in this encounter       Prior labs and Blood pressures:  BP Readings from Last 3 Encounters:   10/13/21 130/82   07/08/21 132/85   05/20/21 131/82     Lab Results   Component Value Date/Time    Sodium 139 10/20/2021 09:04 AM    Potassium 4.1 10/20/2021 09:04 AM    Chloride 108 10/20/2021 09:04 AM    CO2 25 10/20/2021 09:04 AM    Anion gap 6 10/20/2021 09:04 AM    Glucose 167 (H) 10/20/2021 09:04 AM    BUN 13 10/20/2021 09:04 AM    Creatinine 0.77 10/20/2021 09:04 AM    BUN/Creatinine ratio 17 10/20/2021 09:04 AM    GFR est AA >60 10/20/2021 09:04 AM    GFR est non-AA >60 10/20/2021 09:04 AM    Calcium 9.6 10/20/2021 09:04 AM     Lab Results   Component Value Date/Time    Hemoglobin A1c 6.5 (H) 10/20/2021 09:04 AM    Hemoglobin A1c (POC) 5.9 (A) 10/10/2018 04:04 PM     Lab Results   Component Value Date/Time    Cholesterol, total 189 10/20/2021 09:04 AM    Cholesterol (POC) 173 05/31/2017 02:21 PM    HDL Cholesterol 63 10/20/2021 09:04 AM    HDL Cholesterol (POC) 66 05/31/2017 02:21 PM    LDL Cholesterol (POC) 85 05/31/2017 02:21 PM    LDL, calculated 93.8 10/20/2021 09:04 AM    VLDL, calculated 32.2 10/20/2021 09:04 AM    Triglyceride 161 (H) 10/20/2021 09:04 AM    Triglycerides (POC) 113 05/31/2017 02:21 PM    CHOL/HDL Ratio 3.0 10/20/2021 09:04 AM     Lab Results   Component Value Date/Time    Vitamin D 25-Hydroxy 25.2 (L) 10/06/2020 10:52 AM       Lab Results   Component Value Date/Time    TSH 1.300 05/20/2021 09:19 AM    TSH 1.00 12/19/2019 11:15 AM

## 2022-05-06 ENCOUNTER — OFFICE VISIT (OUTPATIENT)
Dept: FAMILY MEDICINE CLINIC | Age: 62
End: 2022-05-06
Payer: MEDICAID

## 2022-05-06 VITALS
BODY MASS INDEX: 38.92 KG/M2 | TEMPERATURE: 98.2 F | WEIGHT: 242.2 LBS | RESPIRATION RATE: 16 BRPM | DIASTOLIC BLOOD PRESSURE: 76 MMHG | SYSTOLIC BLOOD PRESSURE: 133 MMHG | HEIGHT: 66 IN | HEART RATE: 75 BPM | OXYGEN SATURATION: 98 %

## 2022-05-06 DIAGNOSIS — E55.9 VITAMIN D DEFICIENCY: ICD-10-CM

## 2022-05-06 DIAGNOSIS — K21.9 GASTROESOPHAGEAL REFLUX DISEASE, UNSPECIFIED WHETHER ESOPHAGITIS PRESENT: Primary | ICD-10-CM

## 2022-05-06 DIAGNOSIS — E11.65 TYPE 2 DIABETES MELLITUS WITH HYPERGLYCEMIA, WITHOUT LONG-TERM CURRENT USE OF INSULIN (HCC): ICD-10-CM

## 2022-05-06 DIAGNOSIS — Z12.11 SCREENING FOR COLON CANCER: ICD-10-CM

## 2022-05-06 PROCEDURE — 99214 OFFICE O/P EST MOD 30 MIN: CPT | Performed by: NURSE PRACTITIONER

## 2022-05-06 PROCEDURE — 3044F HG A1C LEVEL LT 7.0%: CPT | Performed by: NURSE PRACTITIONER

## 2022-05-06 RX ORDER — PHENOL/SODIUM PHENOLATE
20 AEROSOL, SPRAY (ML) MUCOUS MEMBRANE DAILY
Qty: 90 TABLET | Refills: 1 | Status: SHIPPED | OUTPATIENT
Start: 2022-05-06

## 2022-05-06 NOTE — PROGRESS NOTES
Identified pt with two pt identifiers(name and ). Reviewed record in preparation for visit and have obtained necessary documentation. Chief Complaint   Patient presents with    Medication Evaluation     Discuss acid reflux experiencing burning throat         Health Maintenance Due   Topic    Pneumococcal 0-64 years (1 - PCV)    Foot Exam Q1     Eye Exam Retinal or Dilated     Shingrix Vaccine Age 50> (1 of 2)       Coordination of Care Questionnaire:  :   1) Have you been to an emergency room, urgent care, or hospitalized since your last visit? If yes, where when, and reason for visit? no      2. Have seen or consulted any other health care provider since your last visit? If yes, where when, and reason for visit?  no        Patient is accompanied by self I have received verbal consent from Alton Caceres to discuss any/all medical information while they are present in the room.

## 2022-05-06 NOTE — PROGRESS NOTES
Assessment/Plan:     Diagnoses and all orders for this visit:    1. Gastroesophageal reflux disease, unspecified whether esophagitis present  -     Omeprazole delayed release (PRILOSEC D/R) 20 mg tablet; Take 1 Tablet by mouth daily.  -     REFERRAL TO GASTROENTEROLOGY  - Worsening, advised on foods to avoid and to select 2 hours after eating. Start omeprazole as directed. Referral to GI for further evaluation and treatment    2. Screening for colon cancer  -     REFERRAL TO GASTROENTEROLOGY    3. Type 2 diabetes mellitus with hyperglycemia, without long-term current use of insulin (HCC)  -     HEMOGLOBIN A1C WITH EAG; Future  -     CBC WITH AUTOMATED DIFF; Future  -     LIPID PANEL; Future  -     METABOLIC PANEL, COMPREHENSIVE; Future  - Presumed stable, labs today    4. Vitamin D deficiency  -     VITAMIN D, 25 HYDROXY; Future  -Present stable labs today         Follow-up and Dispositions    · Return in about 3 months (around 8/6/2022) for Follow Up. Discussed expected course/resolution/complications of diagnosis in detail with patient. Medication risks/benefits/costs/interactions/alternatives discussed with patient. Pt was given after visit summary which includes diagnoses, current medications & vitals. Pt expressed understanding with the diagnosis and plan        Subjective:      Shalonda Huff is a 63 y.o. female who presents for had concerns including Medication Evaluation (Discuss acid reflux experiencing burning throat ). Here today for burping a lot and will have some reflux burning the back of the throat and has never had this problem before. Had a really bad episode about 5 days and after lying down it came up and burned the back of her throat.   In the past has had to get espohgeal stretching and that has been a few years  States it is worse during the allergy season  Has not tried anything over the counter    History of diabetes, due for labs    Current Outpatient Medications Medication Sig Dispense Refill    Omeprazole delayed release (PRILOSEC D/R) 20 mg tablet Take 1 Tablet by mouth daily. 90 Tablet 1    metFORMIN (GLUCOPHAGE) 500 mg tablet TAKE 2 TABS BY MOUTH TWO (2) TIMES DAILY (WITH MEALS). 360 Tablet 1    fexofenadine (ALLEGRA) 180 mg tablet Take 1 Tablet by mouth daily. 90 Tablet 5    glipiZIDE (GLUCOTROL) 10 mg tablet Take 1 Tablet by mouth daily (with lunch). 180 Tablet 3    bisacodyL (DULCOLAX) 5 mg EC tablet Take 5 mg by mouth. As needed     [facility] Use face mask & tubing for nebulizer machine 1 Kit 0    diclofenac EC (VOLTAREN) 50 mg EC tablet Take 1 Tab by mouth two (2) times a day. (Patient taking differently: Take 50 mg by mouth as needed.) 60 Tab 5    solifenacin (VESICARE) 5 mg tablet       ciclopirox (PENLAC) 8 % solution APPLY TOPICALLY ONCE DAILY      ferrous sulfate (Iron) 325 mg (65 mg iron) tablet Take  by mouth Daily (before breakfast).  cholecalciferol (Vitamin D3) (1000 Units /25 mcg) tablet Take  by mouth daily.  docusate sodium (Stool Softener) 100 mg capsule Take 100 mg by mouth as needed.  Nebulizer & Compressor machine Use every 6 hours as needed for shortness of breath 1 Each 0    multivitamin (DAILY VITAMINS PO) Take  by mouth.  omega 3-DHA-EPA-fish oil (FISH OIL) 1,000 mg (120 mg-180 mg) capsule Take 1 Cap by mouth daily. Indications: high amount of triglyceride in the blood 90 Cap 1    budesonide-formoterol (SYMBICORT) 80-4.5 mcg/actuation HFAA Take 2 Puffs by inhalation two (2) times a day. (Patient taking differently: Take 2 Puffs by inhalation as needed.) 1 Inhaler 1    montelukast (SINGULAIR) 10 mg tablet Take 1 Tab by mouth daily. 30 Tab 1    latanoprost (XALATAN) 0.005 % ophthalmic solution Administer 1 Drop to both eyes nightly.          Allergies   Allergen Reactions    Metformin Nausea Only    Percocet [Oxycodone-Acetaminophen] Other (comments)     \"It makes me feel bad, real bad,like I have the flu. \"     Past Medical History:   Diagnosis Date    Adverse effect of anesthesia     WITHIN 20-30 MINUTES AFTER WAKING UP FROM CARPAL TUNNEL SURGERY, HAD DIFFICULTY BREATHING ,     Anemia     Anxiety     Glucose intolerance (impaired glucose tolerance)     Menopause 2018     Past Surgical History:   Procedure Laterality Date    HX CARPAL TUNNEL RELEASE Bilateral     HX  SECTION      HX HYSTERECTOMY  2018    HX LAP CHOLECYSTECTOMY      HX OOPHORECTOMY Bilateral 2018    HX ORTHOPAEDIC Left 2015    SPUR REMOVED TO LEFT FOOT    HX ORTHOPAEDIC Left     SPIN AND SCREW IN LEFT ARM     Family History   Problem Relation Age of Onset    Stroke Mother     Diabetes Mother     Other Mother         BRAIN ANURYSM     Diabetes Father     Kidney Disease Father     Hypertension Father     Diabetes Sister     Alzheimer's Disease Maternal Grandmother     Anesth Problems Neg Hx      Social History     Socioeconomic History    Marital status:      Spouse name: Not on file    Number of children: Not on file    Years of education: Not on file    Highest education level: Not on file   Occupational History    Occupation: supervisor     Comment: [employer]   Tobacco Use    Smoking status: Former Smoker     Packs/day: 0.25     Years: 15.00     Pack years: 3.75     Quit date: 1/3/2000     Years since quittin.3    Smokeless tobacco: Never Used   Vaping Use    Vaping Use: Never used   Substance and Sexual Activity    Alcohol use:  Yes     Alcohol/week: 1.0 standard drink     Types: 1 Standard drinks or equivalent per week     Comment: OCCASSIONAL    Drug use: No    Sexual activity: Not on file   Other Topics Concern    Not on file   Social History Narrative    Not on file     Social Determinants of Health     Financial Resource Strain:     Difficulty of Paying Living Expenses: Not on file   Food Insecurity:     Worried About Running Out of Food in the Last Year: Not on file  Ran Out of Food in the Last Year: Not on file   Transportation Needs:     Lack of Transportation (Medical): Not on file    Lack of Transportation (Non-Medical): Not on file   Physical Activity:     Days of Exercise per Week: Not on file    Minutes of Exercise per Session: Not on file   Stress:     Feeling of Stress : Not on file   Social Connections:     Frequency of Communication with Friends and Family: Not on file    Frequency of Social Gatherings with Friends and Family: Not on file    Attends [employer] Services: Not on file    Active Member of [address] [place] [address] or Organizations: Not on file    Attends Club or Organization Meetings: Not on file    Marital Status: Not on file   Intimate Partner Violence:     Fear of Current or Ex-Partner: Not on file    Emotionally Abused: Not on file    Physically Abused: Not on file    Sexually Abused: Not on file   Housing Stability:     Unable to Pay for Housing in the Last Year: Not on file    Number of [place] in the Last Year: Not on file    Unstable Housing in the Last Year: Not on file       HPI      ROS:   Review of Systems   Constitutional: Negative for chills, fever and malaise/fatigue. Eyes: Negative for blurred vision. Respiratory: Negative for cough and shortness of breath. Cardiovascular: Negative for chest pain, palpitations and leg swelling. Gastrointestinal: Positive for heartburn. Negative for abdominal pain, blood in stool, constipation, diarrhea, melena, nausea and vomiting. Neurological: Negative for dizziness and headaches. Objective:     Visit Vitals  /76 (BP 1 Location: Left upper arm, BP Patient Position: Sitting, BP Cuff Size: Adult long)   Pulse 75   Temp 98.2 °F (36.8 °C) (Temporal)   Resp 16   Ht 5' 6\" (1.676 m)   Wt 242 lb 3.2 oz (109.9 kg)   SpO2 98%   BMI 39.09 kg/m²         Vitals and Nurse Documentation reviewed. Physical Exam  Constitutional:       General: She is not in acute distress.      Appearance: She is not diaphoretic. HENT:      Head: Normocephalic and atraumatic. Cardiovascular:      Rate and Rhythm: Normal rate and regular rhythm. Heart sounds: Normal heart sounds. No murmur heard. No friction rub. No gallop. Pulmonary:      Effort: Pulmonary effort is normal. No respiratory distress. Breath sounds: Normal breath sounds. No wheezing or rales. Abdominal:      General: Bowel sounds are normal.      Tenderness: There is no abdominal tenderness. Skin:     General: Skin is warm and dry. Coloration: Skin is not pale. Neurological:      Mental Status: She is alert and oriented to person, place, and time. Psychiatric:         Mood and Affect: Affect normal. Mood is not anxious or depressed. Behavior: Behavior is not agitated. Thought Content: Thought content does not include homicidal or suicidal ideation. Results for orders placed or performed in visit on 05/06/22   HEMOGLOBIN A1C WITH EAG   Result Value Ref Range    Hemoglobin A1c 6.6 (H) 4.8 - 5.6 %    Estimated average glucose 143 mg/dL   CBC WITH AUTOMATED DIFF   Result Value Ref Range    WBC 6.2 3.4 - 10.8 x10E3/uL    RBC 4.25 3.77 - 5.28 x10E6/uL    HGB 11.6 11.1 - 15.9 g/dL    HCT 36.4 34.0 - 46.6 %    MCV 86 79 - 97 fL    MCH 27.3 26.6 - 33.0 pg    MCHC 31.9 31.5 - 35.7 g/dL    RDW 11.0 (L) 11.7 - 15.4 %    PLATELET 428 107 - 924 x10E3/uL    NEUTROPHILS 64 Not Estab. %    Lymphocytes 26 Not Estab. %    MONOCYTES 6 Not Estab. %    EOSINOPHILS 3 Not Estab. %    BASOPHILS 1 Not Estab. %    ABS. NEUTROPHILS 3.9 1.4 - 7.0 x10E3/uL    Abs Lymphocytes 1.6 0.7 - 3.1 x10E3/uL    ABS. MONOCYTES 0.4 0.1 - 0.9 x10E3/uL    ABS. EOSINOPHILS 0.2 0.0 - 0.4 x10E3/uL    ABS. BASOPHILS 0.1 0.0 - 0.2 x10E3/uL    IMMATURE GRANULOCYTES 0 Not Estab. %    ABS. IMM.  GRANS. 0.0 0.0 - 0.1 x10E3/uL   LIPID PANEL   Result Value Ref Range    Cholesterol, total 183 100 - 199 mg/dL    Triglyceride 171 (H) 0 - 149 mg/dL    HDL Cholesterol 49 >39 mg/dL    VLDL, calculated 30 5 - 40 mg/dL    LDL, calculated 104 (H) 0 - 99 mg/dL   METABOLIC PANEL, COMPREHENSIVE   Result Value Ref Range    Glucose 250 (H) 65 - 99 mg/dL    BUN 10 8 - 27 mg/dL    Creatinine 0.77 0.57 - 1.00 mg/dL    eGFR 88 >59 mL/min/1.73    BUN/Creatinine ratio 13 12 - 28    Sodium 140 134 - 144 mmol/L    Potassium 4.6 3.5 - 5.2 mmol/L    Chloride 101 96 - 106 mmol/L    CO2 24 20 - 29 mmol/L    Calcium 9.8 8.7 - 10.3 mg/dL    Protein, total 7.1 6.0 - 8.5 g/dL    Albumin 4.3 3.8 - 4.9 g/dL    GLOBULIN, TOTAL 2.8 1.5 - 4.5 g/dL    A-G Ratio 1.5 1.2 - 2.2    Bilirubin, total 0.3 0.0 - 1.2 mg/dL    Alk.  phosphatase 118 44 - 121 IU/L    AST (SGOT) 45 (H) 0 - 40 IU/L    ALT (SGPT) 67 (H) 0 - 32 IU/L   VITAMIN D, 25 HYDROXY   Result Value Ref Range    VITAMIN D, 25-HYDROXY 23.4 (L) 30.0 - 100.0 ng/mL

## 2022-05-06 NOTE — PATIENT INSTRUCTIONS
Learning About Meal Planning for Diabetes  Why plan your meals? Meal planning can be a key part of managing diabetes. Planning meals and snacks with the right balance of carbohydrate, protein, and fat can help you keep your blood sugar at the target level you set with your doctor. You don't have to eat special foods. You can eat what your family eats, including sweets once in a while. But you do have to pay attention to how often you eat and how much you eat of certain foods. You may want to work with a dietitian or a diabetes educator. They can give you tips and meal ideas and can answer your questions about meal planning. This health professional can also help you reach a healthy weight if that is one of your goals. What plan is right for you? Your dietitian or diabetes educator may suggest that you start with the plate format or carbohydrate counting. The plate format  The plate format is a simple way to help you manage how you eat. You plan meals by learning how much space each food should take on a plate. Using the plate format helps you manage the amount of carbohydrate you eat. It can make it easier to keep your blood sugar level within your target range. It also helps you see if you're eating healthy portion sizes. To use the plate format, you put non-starchy vegetables on half your plate. Add lean protein foods, such as fish, lean meats and poultry, or soy products, on one-quarter of the plate. Put a grain or starchy vegetable (such as brown rice or a potato) on the final quarter of the plate. You can add a small piece of fruit and some low-fat or fat-free milk or yogurt, depending on your carbohydrate goal for each meal.  Here are some tips for using the plate format:  · Make sure that you are not using an oversized plate. A 9-inch plate is best. Many restaurants use larger plates. · Get used to using the plate format at home. Then you can use it when you eat out.   · Write down your questions about using the plate format. Talk to your doctor, a dietitian, or a diabetes educator about your concerns. Carbohydrate counting  With carbohydrate counting, you plan meals based on the amount of carbohydrate in each food. Carbohydrate raises blood sugar higher and more quickly than any other nutrient. It is found in desserts, breads and cereals, and fruit. It's also found in starchy vegetables such as potatoes and corn, grains such as rice and pasta, and milk and yogurt. You can help keep your blood sugar levels within your target range by planning how much carbohydrate to have at meals and snacks. The amount you need depends on several things. These include your weight, how active you are, which diabetes medicines you take, and what your goals are for your blood sugar levels. A registered dietitian or diabetes educator can help you plan how much carbohydrate to include in each meal and snack. An example of a carbohydrate counting plan is:  · 45 to 60 grams at each meal. That's about the same as 3 to 4 carbohydrate servings. · 15 to 20 grams at each snack. That's about the same as 1 carbohydrate serving. The Nutrition Facts label on packaged foods tells you how much carbohydrate is in a serving of the food. First, look at the serving size on the food label. Is that the amount you eat in a serving? All of the nutrition information on a food label is based on that serving size. So if you eat more or less than that, you'll need to adjust the other numbers. Total carbohydrate is the next thing you need to look for on the label. If you count carbohydrate servings, one serving of carbohydrate is 15 grams. For foods that don't come with labels, such as fresh fruits and vegetables, you'll need a guide that lists carbohydrate in these foods. Ask your doctor, dietitian, or diabetes educator about books or other nutrition guides you can use.   If you take insulin, you need to know how many grams of carbohydrate are in a meal. This lets you know how much rapid-acting insulin to take before you eat. If you use an insulin pump, you get a constant rate of insulin during the day. So the pump must be programmed at meals to give you extra insulin to cover the rise in blood sugar after meals. When you know how much carbohydrate you will eat, you can take the right amount of insulin. Or, if you always use the same amount of insulin, you need to make sure that you eat the same amount of carbohydrate at meals. If you need more help to understand carbohydrate counting and food labels, ask your doctor, dietitian, or diabetes educator. How can you plan healthy meals? Here are some tips to get started:  · Plan your meals a week at a time. Don't forget to include snacks too. · Use cookbooks or online recipes to plan several main meals. Plan some quick meals for busy nights. You also can double some recipes that freeze well. Then you can save half for other busy nights when you don't have time to cook. · Make sure you have the ingredients you need for your recipes. If you're running low on basic items, put these items on your shopping list too. · List foods that you use to make breakfasts, lunches, and snacks. List plenty of fruits and vegetables. · Post this list on the refrigerator. Add to it as you think of more things you need. · Take the list to the store to do your weekly shopping. Follow-up care is a key part of your treatment and safety. Be sure to make and go to all appointments, and call your doctor if you are having problems. It's also a good idea to know your test results and keep a list of the medicines you take. Where can you learn more? Go to http://www.gray.com/  Enter B061 in the search box to learn more about \"Learning About Meal Planning for Diabetes. \"  Current as of: September 8, 2021               Content Version: 13.2  © 4460-0059 Healthwise, [facility].    Care instructions adapted under license by [employer] (which disclaims liability or warranty for this information). If you have questions about a medical condition or this instruction, always ask your healthcare professional. [address] any warranty or liability for your use of this information.

## 2022-05-07 LAB
25(OH)D3+25(OH)D2 SERPL-MCNC: 23.4 NG/ML (ref 30–100)
ALBUMIN SERPL-MCNC: 4.3 G/DL (ref 3.8–4.9)
ALBUMIN/GLOB SERPL: 1.5 {RATIO} (ref 1.2–2.2)
ALP SERPL-CCNC: 118 IU/L (ref 44–121)
ALT SERPL-CCNC: 67 IU/L (ref 0–32)
AST SERPL-CCNC: 45 IU/L (ref 0–40)
BASOPHILS # BLD AUTO: 0.1 X10E3/UL (ref 0–0.2)
BASOPHILS NFR BLD AUTO: 1 %
BILIRUB SERPL-MCNC: 0.3 MG/DL (ref 0–1.2)
BUN SERPL-MCNC: 10 MG/DL (ref 8–27)
BUN/CREAT SERPL: 13 (ref 12–28)
CALCIUM SERPL-MCNC: 9.8 MG/DL (ref 8.7–10.3)
CHLORIDE SERPL-SCNC: 101 MMOL/L (ref 96–106)
CHOLEST SERPL-MCNC: 183 MG/DL (ref 100–199)
CO2 SERPL-SCNC: 24 MMOL/L (ref 20–29)
CREAT SERPL-MCNC: 0.77 MG/DL (ref 0.57–1)
EGFR: 88 ML/MIN/1.73
EOSINOPHIL # BLD AUTO: 0.2 X10E3/UL (ref 0–0.4)
EOSINOPHIL NFR BLD AUTO: 3 %
ERYTHROCYTE [DISTWIDTH] IN BLOOD BY AUTOMATED COUNT: 11 % (ref 11.7–15.4)
EST. AVERAGE GLUCOSE BLD GHB EST-MCNC: 143 MG/DL
GLOBULIN SER CALC-MCNC: 2.8 G/DL (ref 1.5–4.5)
GLUCOSE SERPL-MCNC: 250 MG/DL (ref 65–99)
HBA1C MFR BLD: 6.6 % (ref 4.8–5.6)
HCT VFR BLD AUTO: 36.4 % (ref 34–46.6)
HDLC SERPL-MCNC: 49 MG/DL
HGB BLD-MCNC: 11.6 G/DL (ref 11.1–15.9)
IMM GRANULOCYTES # BLD AUTO: 0 X10E3/UL (ref 0–0.1)
IMM GRANULOCYTES NFR BLD AUTO: 0 %
LDLC SERPL CALC-MCNC: 104 MG/DL (ref 0–99)
LYMPHOCYTES # BLD AUTO: 1.6 X10E3/UL (ref 0.7–3.1)
LYMPHOCYTES NFR BLD AUTO: 26 %
MCH RBC QN AUTO: 27.3 PG (ref 26.6–33)
MCHC RBC AUTO-ENTMCNC: 31.9 G/DL (ref 31.5–35.7)
MCV RBC AUTO: 86 FL (ref 79–97)
MONOCYTES # BLD AUTO: 0.4 X10E3/UL (ref 0.1–0.9)
MONOCYTES NFR BLD AUTO: 6 %
NEUTROPHILS # BLD AUTO: 3.9 X10E3/UL (ref 1.4–7)
NEUTROPHILS NFR BLD AUTO: 64 %
PLATELET # BLD AUTO: 287 X10E3/UL (ref 150–450)
POTASSIUM SERPL-SCNC: 4.6 MMOL/L (ref 3.5–5.2)
PROT SERPL-MCNC: 7.1 G/DL (ref 6–8.5)
RBC # BLD AUTO: 4.25 X10E6/UL (ref 3.77–5.28)
SODIUM SERPL-SCNC: 140 MMOL/L (ref 134–144)
TRIGL SERPL-MCNC: 171 MG/DL (ref 0–149)
VLDLC SERPL CALC-MCNC: 30 MG/DL (ref 5–40)
WBC # BLD AUTO: 6.2 X10E3/UL (ref 3.4–10.8)

## 2022-05-16 NOTE — TELEPHONE ENCOUNTER
PCP: Stewart Gruber MD    Last appt: 5/6/2022  No future appointments.     Requested Prescriptions     Pending Prescriptions Disp Refills    solifenacin (VESICARE) 5 mg tablet         Prior labs and Blood pressures:  BP Readings from Last 3 Encounters:   05/06/22 133/76   10/13/21 130/82   07/08/21 132/85     Lab Results   Component Value Date/Time    Sodium 140 05/06/2022 12:00 AM    Potassium 4.6 05/06/2022 12:00 AM    Chloride 101 05/06/2022 12:00 AM    CO2 24 05/06/2022 12:00 AM    Anion gap 6 10/20/2021 09:04 AM    Glucose 250 (H) 05/06/2022 12:00 AM    BUN 10 05/06/2022 12:00 AM    Creatinine 0.77 05/06/2022 12:00 AM    BUN/Creatinine ratio 13 05/06/2022 12:00 AM    GFR est AA >60 10/20/2021 09:04 AM    GFR est non-AA >60 10/20/2021 09:04 AM    Calcium 9.8 05/06/2022 12:00 AM     Lab Results   Component Value Date/Time    Hemoglobin A1c 6.6 (H) 05/06/2022 12:00 AM    Hemoglobin A1c (POC) 5.9 (A) 10/10/2018 04:04 PM     Lab Results   Component Value Date/Time    Cholesterol, total 183 05/06/2022 12:00 AM    Cholesterol (POC) 173 05/31/2017 02:21 PM    HDL Cholesterol 49 05/06/2022 12:00 AM    HDL Cholesterol (POC) 66 05/31/2017 02:21 PM    LDL Cholesterol (POC) 85 05/31/2017 02:21 PM    LDL, calculated 104 (H) 05/06/2022 12:00 AM    LDL, calculated 93.8 10/20/2021 09:04 AM    VLDL, calculated 30 05/06/2022 12:00 AM    VLDL, calculated 32.2 10/20/2021 09:04 AM    Triglyceride 171 (H) 05/06/2022 12:00 AM    Triglycerides (POC) 113 05/31/2017 02:21 PM    CHOL/HDL Ratio 3.0 10/20/2021 09:04 AM     Lab Results   Component Value Date/Time    Vitamin D 25-Hydroxy 25.2 (L) 10/06/2020 10:52 AM    VITAMIN D, 25-HYDROXY 23.4 (L) 05/06/2022 12:00 AM       Lab Results   Component Value Date/Time    TSH 1.300 05/20/2021 09:19 AM    TSH 1.00 12/19/2019 11:15 AM

## 2022-05-17 RX ORDER — SOLIFENACIN SUCCINATE 5 MG/1
5 TABLET, FILM COATED ORAL DAILY
Qty: 90 TABLET | Refills: 3 | Status: SHIPPED | OUTPATIENT
Start: 2022-05-17 | End: 2022-06-24 | Stop reason: SDUPTHER

## 2022-06-14 DIAGNOSIS — M79.89 LEG SWELLING: ICD-10-CM

## 2022-06-15 RX ORDER — FUROSEMIDE 20 MG/1
TABLET ORAL
Qty: 30 TABLET | Refills: 1 | Status: SHIPPED | OUTPATIENT
Start: 2022-06-15 | End: 2022-07-11

## 2022-06-24 NOTE — TELEPHONE ENCOUNTER
Please advise!!    .  PCP: Jorge Mackey MD    Last appt: 5/6/2022  No future appointments.     Requested Prescriptions      No prescriptions requested or ordered in this encounter       Prior labs and Blood pressures:  BP Readings from Last 3 Encounters:   05/06/22 133/76   10/13/21 130/82   07/08/21 132/85     Lab Results   Component Value Date/Time    Sodium 140 05/06/2022 12:00 AM    Potassium 4.6 05/06/2022 12:00 AM    Chloride 101 05/06/2022 12:00 AM    CO2 24 05/06/2022 12:00 AM    Anion gap 6 10/20/2021 09:04 AM    Glucose 250 (H) 05/06/2022 12:00 AM    BUN 10 05/06/2022 12:00 AM    Creatinine 0.77 05/06/2022 12:00 AM    BUN/Creatinine ratio 13 05/06/2022 12:00 AM    GFR est AA >60 10/20/2021 09:04 AM    GFR est non-AA >60 10/20/2021 09:04 AM    Calcium 9.8 05/06/2022 12:00 AM     Lab Results   Component Value Date/Time    Hemoglobin A1c 6.6 (H) 05/06/2022 12:00 AM    Hemoglobin A1c (POC) 5.9 (A) 10/10/2018 04:04 PM     Lab Results   Component Value Date/Time    Cholesterol, total 183 05/06/2022 12:00 AM    Cholesterol (POC) 173 05/31/2017 02:21 PM    HDL Cholesterol 49 05/06/2022 12:00 AM    HDL Cholesterol (POC) 66 05/31/2017 02:21 PM    LDL Cholesterol (POC) 85 05/31/2017 02:21 PM    LDL, calculated 104 (H) 05/06/2022 12:00 AM    LDL, calculated 93.8 10/20/2021 09:04 AM    VLDL, calculated 30 05/06/2022 12:00 AM    VLDL, calculated 32.2 10/20/2021 09:04 AM    Triglyceride 171 (H) 05/06/2022 12:00 AM    Triglycerides (POC) 113 05/31/2017 02:21 PM    CHOL/HDL Ratio 3.0 10/20/2021 09:04 AM     Lab Results   Component Value Date/Time    Vitamin D 25-Hydroxy 25.2 (L) 10/06/2020 10:52 AM    VITAMIN D, 25-HYDROXY 23.4 (L) 05/06/2022 12:00 AM       Lab Results   Component Value Date/Time    TSH 1.300 05/20/2021 09:19 AM    TSH 1.00 12/19/2019 11:15 AM

## 2022-06-27 RX ORDER — SOLIFENACIN SUCCINATE 5 MG/1
5 TABLET, FILM COATED ORAL DAILY
Qty: 90 TABLET | Refills: 3 | Status: SHIPPED | OUTPATIENT
Start: 2022-06-27 | End: 2022-06-30 | Stop reason: SDUPTHER

## 2022-06-30 ENCOUNTER — VIRTUAL VISIT (OUTPATIENT)
Dept: FAMILY MEDICINE CLINIC | Age: 62
End: 2022-06-30
Payer: MEDICAID

## 2022-06-30 DIAGNOSIS — N39.46 MIXED STRESS AND URGE URINARY INCONTINENCE: Primary | ICD-10-CM

## 2022-06-30 DIAGNOSIS — N32.81 OVERACTIVE BLADDER: ICD-10-CM

## 2022-06-30 DIAGNOSIS — M54.42 CHRONIC BILATERAL LOW BACK PAIN WITH BILATERAL SCIATICA: ICD-10-CM

## 2022-06-30 DIAGNOSIS — M54.41 CHRONIC BILATERAL LOW BACK PAIN WITH BILATERAL SCIATICA: ICD-10-CM

## 2022-06-30 DIAGNOSIS — G89.29 CHRONIC BILATERAL LOW BACK PAIN WITH BILATERAL SCIATICA: ICD-10-CM

## 2022-06-30 DIAGNOSIS — M54.2 NECK PAIN: ICD-10-CM

## 2022-06-30 PROCEDURE — 99214 OFFICE O/P EST MOD 30 MIN: CPT | Performed by: FAMILY MEDICINE

## 2022-06-30 RX ORDER — SOLIFENACIN SUCCINATE 5 MG/1
5 TABLET, FILM COATED ORAL DAILY
Qty: 90 TABLET | Refills: 3 | Status: SHIPPED | OUTPATIENT
Start: 2022-06-30

## 2022-06-30 NOTE — PROGRESS NOTES
Shruthi Mcclure is a 63 y.o. female      Chief Complaint   Patient presents with    Constipation    Diarrhea    Follow-up     Medication          1. Have you been to the ER, urgent care clinic since your last visit? Hospitalized since your last visit? No       2. Have you seen or consulted any other health care providers outside of the [address] [place] [address] since your last visit? Include any pap smears or colon screening.   No

## 2022-07-09 DIAGNOSIS — M79.89 LEG SWELLING: ICD-10-CM

## 2022-07-11 RX ORDER — FUROSEMIDE 20 MG/1
TABLET ORAL
Qty: 30 TABLET | Refills: 1 | Status: SHIPPED | OUTPATIENT
Start: 2022-07-11 | End: 2022-08-05

## 2022-07-21 LAB — HBA1C MFR BLD HPLC: 7.6 %

## 2022-07-22 ENCOUNTER — TRANSCRIBE ORDER (OUTPATIENT)
Dept: SCHEDULING | Age: 62
End: 2022-07-22

## 2022-07-22 DIAGNOSIS — R19.4 ALTERED BOWEL HABITS: ICD-10-CM

## 2022-07-22 DIAGNOSIS — R73.09 ABNORMAL GLUCOSE: ICD-10-CM

## 2022-07-22 DIAGNOSIS — R13.10 DYSPHAGIA: Primary | ICD-10-CM

## 2022-07-22 DIAGNOSIS — R10.9 ABDOMINAL PAIN: ICD-10-CM

## 2022-08-05 DIAGNOSIS — M79.89 LEG SWELLING: ICD-10-CM

## 2022-08-05 RX ORDER — FUROSEMIDE 20 MG/1
TABLET ORAL
Qty: 30 TABLET | Refills: 1 | Status: SHIPPED | OUTPATIENT
Start: 2022-08-05 | End: 2022-09-20

## 2022-08-09 ENCOUNTER — VIRTUAL VISIT (OUTPATIENT)
Dept: FAMILY MEDICINE CLINIC | Age: 62
End: 2022-08-09
Payer: MEDICAID

## 2022-08-09 DIAGNOSIS — G89.29 CHRONIC NONINTRACTABLE HEADACHE, UNSPECIFIED HEADACHE TYPE: ICD-10-CM

## 2022-08-09 DIAGNOSIS — R51.9 CHRONIC NONINTRACTABLE HEADACHE, UNSPECIFIED HEADACHE TYPE: ICD-10-CM

## 2022-08-09 DIAGNOSIS — E11.9 CONTROLLED TYPE 2 DIABETES MELLITUS WITHOUT COMPLICATION, WITHOUT LONG-TERM CURRENT USE OF INSULIN (HCC): Primary | ICD-10-CM

## 2022-08-09 PROCEDURE — 99214 OFFICE O/P EST MOD 30 MIN: CPT | Performed by: FAMILY MEDICINE

## 2022-08-09 PROCEDURE — 3044F HG A1C LEVEL LT 7.0%: CPT | Performed by: FAMILY MEDICINE

## 2022-08-09 RX ORDER — BUTALBITAL, ACETAMINOPHEN AND CAFFEINE 50; 325; 40 MG/1; MG/1; MG/1
1 TABLET ORAL
Qty: 30 TABLET | Refills: 0 | Status: SHIPPED | OUTPATIENT
Start: 2022-08-09

## 2022-08-09 NOTE — PROGRESS NOTES
Consent: Nithin Tabares, who was seen by synchronous (real-time) audio-video technology, and/or her healthcare decision maker, is aware that this patient-initiated, Telehealth encounter on 8/9/2022 is a billable service, with coverage as determined by her insurance carrier. She is aware that she may receive a bill and has provided verbal consent to proceed: Yes. Assessment & Plan:   Diagnoses and all orders for this visit:    1. Controlled type 2 diabetes mellitus without complication, without long-term current use of insulin (HCC)  -     SITagliptin (JANUVIA) 100 mg tablet; Take 1 Tablet by mouth in the morning. 2. Chronic nonintractable headache, unspecified headache type  -     butalbital-acetaminophen-caffeine (FIORICET, ESGIC) -40 mg per tablet; Take 1 Tablet by mouth every six (6) hours as needed for Headache.  -     REFERRAL TO NEUROLOGY          Follow-up and Dispositions    Return in about 2 weeks (around 8/23/2022) for follow up, DIABETES. I ADVISED PATIENT TO GO TO ER IF SYMPTOMS WORSEN , CHANGE OR FAILS TO IMPROVE. I spent at least 15 minutes with this established patient, and >50% of the time was spent counseling and/or coordinating care regarding SEE BELOW  [phone]  Subjective:   Nithin Tabares is a 63 y.o. 1960 female  established patient, who was seen for Other (Alternative for Metformin ) and Headache          1. Controlled type 2 diabetes mellitus without complication, without long-term current use of insulin ([facility]    Patient is requesting to stop metformin. She reports she has undergone GI work-up and her gastroenterologist has suggested to stop metformin because it is causing diarrhea. We will stop metformin today. Start Januvia. Advised patient to keep blood sugar log. Patient presents today for diabetes follow up. Pt. current diabetic medications include metformin and glipizide. Taking medication as prescribed.  Current monitoring regimen: home blood tests - rarely. Home blood sugar records: fasting range: Does not check. Any episodes of hypoglycemia? no. Known diabetic complications: none. Cardiovascular risk factors: dyslipidemia, diabetes mellitus. Diabetic Foot and Eye Exam HM Status   Topic Date Due    Diabetic Foot Care  Never done    Eye Exam  Never done     There is no immunization history for the selected administration types on file for this patient. Lab Results   Component Value Date/Time    Microalbumin/Creat ratio (mg/g creat) 12 07/08/2021 02:03 PM    Microalbumin,urine random 2.13 07/08/2021 02:03 PM     Lab Results   Component Value Date/Time    Hemoglobin A1c 6.6 (H) 05/06/2022 12:00 AM    Hemoglobin A1c (POC) 5.9 (A) 10/10/2018 04:04 PM            2. Chronic nonintractable headache, unspecified headache type  Patient reports history of chronic migraines. She has been taking Excedrin over-the-counter. Recently she reports migraine behind the eye. She is getting daily migraines. I will start Fioricet as needed. I will also refer to neurology. Prior to Admission medications    Medication Sig Start Date End Date Taking? Authorizing Provider   SITagliptin (JANUVIA) 100 mg tablet Take 1 Tablet by mouth in the morning. 8/9/22  Yes Ean Bianchi MD   butalbital-acetaminophen-caffeine (FIORICET, ESGIC) -40 mg per tablet Take 1 Tablet by mouth every six (6) hours as needed for Headache. 8/9/22  Yes Ean Bianchi MD   furosemide (LASIX) 20 mg tablet TAKE 1 TABLET BY MOUTH DAILY AS NEEDED FOR LEG SWELLING 8/5/22  Yes Jasbir Obrien NP   Omeprazole delayed release (PRILOSEC D/R) 20 mg tablet Take 1 Tablet by mouth daily. 5/6/22  Yes Jasbir Obrien NP   fexofenadine (ALLEGRA) 180 mg tablet Take 1 Tablet by mouth daily. 10/13/21  Yes Ean Bianchi MD   glipiZIDE (GLUCOTROL) 10 mg tablet Take 1 Tablet by mouth daily (with lunch). 10/13/21  Yes Ean Bianchi MD   bisacodyL (DULCOLAX) 5 mg EC tablet Take 5 mg by mouth.  As needed Yes Provider, Historical   Nebulizer Accessories kit Use face mask & tubing for nebulizer machine 12/21/20  Yes Humberto Alvarado MD   diclofenac EC (VOLTAREN) 50 mg EC tablet Take 1 Tab by mouth two (2) times a day. Patient taking differently: Take 50 mg by mouth as needed. 12/15/20  Yes Humberto Alvarado MD   ciclopirox (PENLAC) 8 % solution APPLY TOPICALLY ONCE DAILY 9/28/20  Yes Provider, Historical   ferrous sulfate 325 mg (65 mg iron) tablet Take  by mouth Daily (before breakfast). Yes Provider, Historical   cholecalciferol (VITAMIN D3) (1000 Units /25 mcg) tablet Take  by mouth daily. Yes Provider, Historical   docusate sodium (COLACE) 100 mg capsule Take 100 mg by mouth as needed. Yes Provider, Historical   Nebulizer & Compressor machine Use every 6 hours as needed for shortness of breath 1/29/20  Yes Humberto Alvarado MD   multivitamin (DAILY VITAMINS PO) Take  by mouth. Yes Provider, Historical   omega 3-DHA-EPA-fish oil (FISH OIL) 1,000 mg (120 mg-180 mg) capsule Take 1 Cap by mouth daily. Indications: high amount of triglyceride in the blood 1/21/20  Yes Humberto Alvarado MD   budesonide-formoterol (SYMBICORT) 80-4.5 mcg/actuation HFAA Take 2 Puffs by inhalation two (2) times a day. Patient taking differently: Take 2 Puffs by inhalation as needed. 11/29/18  Yes Jag Echevarria MD   montelukast (SINGULAIR) 10 mg tablet Take 1 Tab by mouth daily. 11/29/18  Yes Miri Martel MD   latanoprost (XALATAN) 0.005 % ophthalmic solution Administer 1 Drop to both eyes nightly. Yes Provider, Historical   solifenacin (VESICARE) 5 mg tablet Take 1 Tablet by mouth daily. Patient not taking: Reported on 8/9/2022 6/30/22   Humberto Alvarado MD   metFORMIN (GLUCOPHAGE) 500 mg tablet TAKE 2 TABS BY MOUTH TWO (2) TIMES DAILY (WITH MEALS).  3/14/22 8/9/22  Humberto Alvarado MD     Allergies   Allergen Reactions    Metformin Nausea Only    Percocet [Oxycodone-Acetaminophen] Other (comments)     \"It makes me feel bad, real bad,like I have the flu. \"       Patient Active Problem List   Diagnosis Code    Glucose intolerance (impaired glucose tolerance) R73.02    Anemia D64.9    Snoring R06.83    Obstructive sleep apnea syndrome G47.33    Severe obesity (HCC) E66.01    SOB (shortness of breath) R06.02    Pain of right hip joint M25.551    Stridor R06.1    Overactive bladder N32.81    Mixed stress and urge urinary incontinence N39.46     Patient Active Problem List    Diagnosis Date Noted    Overactive bladder 06/30/2022    Mixed stress and urge urinary incontinence 06/30/2022    Stridor 11/28/2018    Severe obesity ([address] 10/10/2018    SOB (shortness of breath) 10/10/2018    Pain of right hip joint 10/10/2018    Obstructive sleep apnea syndrome 02/02/2018    Snoring 11/29/2017    Glucose intolerance (impaired glucose tolerance)     Anemia      Current Outpatient Medications   Medication Sig Dispense Refill    SITagliptin (JANUVIA) 100 mg tablet Take 1 Tablet by mouth in the morning. 90 Tablet 1    butalbital-acetaminophen-caffeine (FIORICET, ESGIC) -40 mg per tablet Take 1 Tablet by mouth every six (6) hours as needed for Headache. 30 Tablet 0    furosemide (LASIX) 20 mg tablet TAKE 1 TABLET BY MOUTH DAILY AS NEEDED FOR LEG SWELLING 30 Tablet 1    Omeprazole delayed release (PRILOSEC D/R) 20 mg tablet Take 1 Tablet by mouth daily. 90 Tablet 1    fexofenadine (ALLEGRA) 180 mg tablet Take 1 Tablet by mouth daily. 90 Tablet 5    glipiZIDE (GLUCOTROL) 10 mg tablet Take 1 Tablet by mouth daily (with lunch). 180 Tablet 3    bisacodyL (DULCOLAX) 5 mg EC tablet Take 5 mg by mouth. As needed      Nebulizer Accessories kit Use face mask & tubing for nebulizer machine 1 Kit 0    diclofenac EC (VOLTAREN) 50 mg EC tablet Take 1 Tab by mouth two (2) times a day.  (Patient taking differently: Take 50 mg by mouth as needed.) 60 Tab 5    ciclopirox (PENLAC) 8 % solution APPLY TOPICALLY ONCE DAILY      ferrous sulfate 325 mg (65 mg iron) tablet Take by mouth Daily (before breakfast). cholecalciferol (VITAMIN D3) (1000 Units /25 mcg) tablet Take  by mouth daily. docusate sodium (COLACE) 100 mg capsule Take 100 mg by mouth as needed. Nebulizer & Compressor machine Use every 6 hours as needed for shortness of breath 1 Each 0    multivitamin (DAILY VITAMINS PO) Take  by mouth. omega 3-DHA-EPA-fish oil (FISH OIL) 1,000 mg (120 mg-180 mg) capsule Take 1 Cap by mouth daily. Indications: high amount of triglyceride in the blood 90 Cap 1    budesonide-formoterol (SYMBICORT) 80-4.5 mcg/actuation HFAA Take 2 Puffs by inhalation two (2) times a day. (Patient taking differently: Take 2 Puffs by inhalation as needed.) 1 Inhaler 1    montelukast (SINGULAIR) 10 mg tablet Take 1 Tab by mouth daily. 30 Tab 1    latanoprost (XALATAN) 0.005 % ophthalmic solution Administer 1 Drop to both eyes nightly. solifenacin (VESICARE) 5 mg tablet Take 1 Tablet by mouth daily. (Patient not taking: Reported on 2022) 90 Tablet 3     Allergies   Allergen Reactions    Metformin Nausea Only    Percocet [Oxycodone-Acetaminophen] Other (comments)     \"It makes me feel bad, real bad,like I have the flu. \"     Past Medical History:   Diagnosis Date    Adverse effect of anesthesia     WITHIN 20-30 MINUTES AFTER WAKING UP FROM CARPAL TUNNEL SURGERY, HAD DIFFICULTY BREATHING ,     Anemia     Anxiety     Glucose intolerance (impaired glucose tolerance)     Menopause 2018     Past Surgical History:   Procedure Laterality Date    HX CARPAL TUNNEL RELEASE Bilateral     HX  SECTION      HX HYSTERECTOMY  2018    HX LAP CHOLECYSTECTOMY      HX OOPHORECTOMY Bilateral 2018    HX ORTHOPAEDIC Left 2015    SPUR REMOVED TO LEFT FOOT    HX ORTHOPAEDIC Left     SPIN AND SCREW IN LEFT ARM     Family History   Problem Relation Age of Onset    Stroke Mother     Diabetes Mother     Other Mother         BRAIN ANURYSM     Diabetes Father     Kidney Disease Father Hypertension Father     Diabetes Sister     Alzheimer's Disease Maternal Grandmother     Anesth Problems Neg Hx      Social History     Tobacco Use    Smoking status: Former     Packs/day: 0.25     Years: 15.00     Pack years: 3.75     Types: Cigarettes     Quit date: 1/3/2000     Years since quittin.6    Smokeless tobacco: Never   Substance Use Topics    Alcohol use: Yes     Alcohol/week: 1.0 standard drink     Types: 1 Standard drinks or equivalent per week     Comment: OCCASSIONAL       Review of Systems   Constitutional: Negative. HENT: Negative. Eyes: Negative. Respiratory: Negative. Cardiovascular: Negative. Gastrointestinal: Negative. Genitourinary: Negative. Musculoskeletal: Negative. Skin: Negative. Neurological:  Positive for headaches. Endo/Heme/Allergies: Negative. Psychiatric/Behavioral: Negative.            Objective:     Patient-Reported Vitals 2022   Patient-Reported Weight 240lb   Patient-Reported Pulse -   Patient-Reported Temperature -   Patient-Reported SpO2 -   Patient-Reported Systolic  -   Patient-Reported Diastolic -        [INSTRUCTIONS:  \"[x]\" Indicates a positive item  \"[]\" Indicates a negative item  -- DELETE ALL ITEMS NOT EXAMINED]    Constitutional: [x] Appears well-developed and well-nourished [x] No apparent distress      [] Abnormal -     Mental status: [x] Alert and awake  [x] Oriented to person/place/time [x] Able to follow commands    [] Abnormal -     Eyes:   EOM    [x]  Normal    [] Abnormal -   Sclera  [x]  Normal    [] Abnormal -          Discharge [x]  None visible   [] Abnormal -     HENT: [x] Normocephalic, atraumatic  [] Abnormal -   [x] Mouth/Throat: Mucous membranes are moist    External Ears [x] Normal  [] Abnormal -    Neck: [x] No visualized mass [] Abnormal -     Pulmonary/Chest: [x] Respiratory effort normal   [x] No visualized signs of difficulty breathing or respiratory distress        [] Abnormal -      Musculoskeletal: [x] Normal gait with no signs of ataxia         [x] Normal range of motion of neck        [] Abnormal -     Neurological:        [x] No Facial Asymmetry (Cranial nerve 7 motor function) (limited exam due to video visit)          [x] No gaze palsy        [] Abnormal -          Skin:        [x] No significant exanthematous lesions or discoloration noted on facial skin         [] Abnormal -            Psychiatric:       [x] Normal Affect [] Abnormal -        [x] No Hallucinations    Other pertinent observable physical exam findings:-    We discussed the expected course, resolution and complications of the diagnosis(es) in detail. Medication risks, benefits, costs, interactions, and alternatives were discussed as indicated. I advised her to contact the office if her condition worsens, changes or fails to improve as anticipated. She expressed understanding with the diagnosis(es) and plan. Agustin Morejon is a 63 y.o. female  is being evaluated by a Virtual Visit (video visit) encounter to address concerns as mentioned above. A caregiver was present when appropriate. Due to this being a TeleHealth encounter (During JJLYH-57 public health emergency), evaluation of the following organ systems was limited: Vitals/Constitutional/EENT/Resp/CV/GI//MS/Neuro/Skin/Heme-Lymph-Imm. Pursuant to the emergency declaration under the [address] [place] [address] authority and the [employer] Act, this Virtual Visit was conducted with patient's (and/or legal guardian's) consent, to reduce the patient's risk of exposure to COVID-19 and provide necessary medical care. The patient (and/or legal guardian) has also been advised to contact this office for worsening conditions or problems, and seek emergency medical treatment and/or call 911 if deemed necessary.     Patient identification was verified at the start of the visit: YES    Services were provided through a video synchronous discussion virtually to substitute for in-person clinic visit. Patient was located at their homes. Provider located in home    An electronic signature was used to authenticate this note.       Trish Vásquez MD

## 2022-08-09 NOTE — PROGRESS NOTES
1. Have you been to the ER, urgent care clinic since your last visit? Hospitalized since your last visit? No    2. Have you seen or consulted any other health care providers outside of the [address] [place] [address] since your last visit? Include any pap smears or colon screening.  No    Chief Complaint   Patient presents with    Other     Alternative for Metformin     Headache

## 2022-08-11 ENCOUNTER — TRANSCRIBE ORDER (OUTPATIENT)
Dept: SCHEDULING | Age: 62
End: 2022-08-11

## 2022-08-11 DIAGNOSIS — M54.2 NECK PAIN: Primary | ICD-10-CM

## 2022-08-11 DIAGNOSIS — M50.30 DDD (DEGENERATIVE DISC DISEASE), CERVICAL: ICD-10-CM

## 2022-08-11 DIAGNOSIS — M48.10 DISH (DISSEMINATED IDIOPATHIC SKELETAL HYPEROSTOSIS): ICD-10-CM

## 2022-08-11 DIAGNOSIS — M51.36 DDD (DEGENERATIVE DISC DISEASE), LUMBAR: Primary | ICD-10-CM

## 2022-08-11 DIAGNOSIS — M54.12 CERVICAL RADICULOPATHY: ICD-10-CM

## 2022-08-11 DIAGNOSIS — M47.812 ARTHROPATHY OF CERVICAL FACET JOINT: ICD-10-CM

## 2022-08-11 DIAGNOSIS — M62.830 BACK SPASM: ICD-10-CM

## 2022-08-11 DIAGNOSIS — M47.816 LUMBAR FACET ARTHROPATHY: ICD-10-CM

## 2022-08-18 ENCOUNTER — VIRTUAL VISIT (OUTPATIENT)
Dept: FAMILY MEDICINE CLINIC | Age: 62
End: 2022-08-18
Payer: MEDICAID

## 2022-08-18 DIAGNOSIS — E11.9 CONTROLLED TYPE 2 DIABETES MELLITUS WITHOUT COMPLICATION, WITHOUT LONG-TERM CURRENT USE OF INSULIN (HCC): ICD-10-CM

## 2022-08-18 PROCEDURE — 3044F HG A1C LEVEL LT 7.0%: CPT | Performed by: FAMILY MEDICINE

## 2022-08-18 PROCEDURE — 99213 OFFICE O/P EST LOW 20 MIN: CPT | Performed by: FAMILY MEDICINE

## 2022-08-18 RX ORDER — GLIPIZIDE 10 MG/1
10 TABLET ORAL 2 TIMES DAILY
Qty: 180 TABLET | Refills: 3 | Status: SHIPPED | OUTPATIENT
Start: 2022-08-18

## 2022-08-18 NOTE — PROGRESS NOTES
1. Have you been to the ER, urgent care clinic since your last visit? Hospitalized since your last visit? No    2. Have you seen or consulted any other health care providers outside of the [address] [place] [address] since your last visit? Include any pap smears or colon screening.  No      Chief Complaint   Patient presents with    Other     Discuss Medications

## 2022-08-18 NOTE — PROGRESS NOTES
Consent: Kyaw Emery, who was seen by synchronous (real-time) audio-video technology, and/or her healthcare decision maker, is aware that this patient-initiated, Telehealth encounter on 8/18/2022 is a billable service, with coverage as determined by her insurance carrier. She is aware that she may receive a bill and has provided verbal consent to proceed: Yes. Assessment & Plan:   Diagnoses and all orders for this visit:    1. Controlled type 2 diabetes mellitus without complication, without long-term current use of insulin (HCC)  -     glipiZIDE (GLUCOTROL) 10 mg tablet; Take 1 Tablet by mouth two (2) times a day. Follow-up and Dispositions    Return in about 3 months (around 11/18/2022) for follow up, DIABETES. I ADVISED PATIENT TO GO TO ER IF SYMPTOMS WORSEN , CHANGE OR FAILS TO IMPROVE. I spent at least 15 minutes with this established patient, and >50% of the time was spent counseling and/or coordinating care regarding SEE BELOW  [id number]  Subjective:   Kyaw Emery is a 63 y.o. 1960 female  established patient, who was seen for Other (Discuss Medications )          1. Controlled type 2 diabetes mellitus without complication, without long-term current use of insulin (HCC)  Change glipizide to 10 mg twice a day  Patient presents today for diabetes follow up. Pt. current diabetic medications include Januvia and glipizide. Taking medication as prescribed. Current monitoring regimen: home blood tests - daily. Home blood sugar records: fasting range: 140s. Any episodes of hypoglycemia? no. Known diabetic complications: none. Cardiovascular risk factors: dyslipidemia, diabetes mellitus. Diabetic Foot and Eye Exam HM Status   Topic Date Due    Diabetic Foot Care  Never done    Eye Exam  Never done     There is no immunization history for the selected administration types on file for this patient.   Lab Results   Component Value Date/Time    Microalbumin/Creat ratio (mg/g creat) 12 07/08/2021 02:03 PM    Microalbumin,urine random 2.13 07/08/2021 02:03 PM     Lab Results   Component Value Date/Time    Hemoglobin A1c 6.6 (H) 05/06/2022 12:00 AM    Hemoglobin A1c (POC) 5.9 (A) 10/10/2018 04:04 PM               Prior to Admission medications    Medication Sig Start Date End Date Taking? Authorizing Provider   glipiZIDE (GLUCOTROL) 10 mg tablet Take 1 Tablet by mouth two (2) times a day. 8/18/22  Yes Khadar Vasquez MD   SITagliptin (JANUVIA) 100 mg tablet Take 1 Tablet by mouth in the morning. 8/9/22  Yes Khadar Vasquez MD   butalbital-acetaminophen-caffeine (FIORICET, ESGIC) -40 mg per tablet Take 1 Tablet by mouth every six (6) hours as needed for Headache. 8/9/22  Yes Khadar Vasquez MD   furosemide (LASIX) 20 mg tablet TAKE 1 TABLET BY MOUTH DAILY AS NEEDED FOR LEG SWELLING 8/5/22  Yes Yakelin Obrien, EMELY   solifenacin (VESICARE) 5 mg tablet Take 1 Tablet by mouth daily. 6/30/22  Yes Khadar Vasquez MD   Omeprazole delayed release (PRILOSEC D/R) 20 mg tablet Take 1 Tablet by mouth daily. 5/6/22  Yes Yakelin Obrien, NP   fexofenadine (ALLEGRA) 180 mg tablet Take 1 Tablet by mouth daily. 10/13/21  Yes Khadar Vasquez MD   bisacodyL (DULCOLAX) 5 mg EC tablet Take 5 mg by mouth. As needed   Yes Provider, Historical   Nebulizer Accessories kit Use face mask & tubing for nebulizer machine 12/21/20  Yes Khadar Vasquez MD   diclofenac EC (VOLTAREN) 50 mg EC tablet Take 1 Tab by mouth two (2) times a day. Patient taking differently: Take 50 mg by mouth as needed. 12/15/20  Yes Khadar Vasquez MD   ciclopirox (PENLAC) 8 % solution APPLY TOPICALLY ONCE DAILY 9/28/20  Yes Provider, Historical   ferrous sulfate 325 mg (65 mg iron) tablet Take  by mouth Daily (before breakfast). Yes Provider, Historical   cholecalciferol (VITAMIN D3) (1000 Units /25 mcg) tablet Take  by mouth daily. Yes Provider, Historical   docusate sodium (COLACE) 100 mg capsule Take 100 mg by mouth as needed.    Yes Provider, Historical   Nebulizer & Compressor machine Use every 6 hours as needed for shortness of breath 1/29/20  Yes Chetna Abdul MD   multivitamin (DAILY VITAMINS PO) Take  by mouth. Yes Provider, Historical   omega 3-DHA-EPA-fish oil (FISH OIL) 1,000 mg (120 mg-180 mg) capsule Take 1 Cap by mouth daily. Indications: high amount of triglyceride in the blood 1/21/20  Yes Chetna Abdul MD   budesonide-formoterol (SYMBICORT) 80-4.5 mcg/actuation HFAA Take 2 Puffs by inhalation two (2) times a day. Patient taking differently: Take 2 Puffs by inhalation as needed. 11/29/18  Yes Antonette Meckel, MD   montelukast (SINGULAIR) 10 mg tablet Take 1 Tab by mouth daily. 11/29/18  Yes Marcia Martel MD   latanoprost (XALATAN) 0.005 % ophthalmic solution Administer 1 Drop to both eyes nightly. Yes Provider, Historical   glipiZIDE (GLUCOTROL) 10 mg tablet Take 1 Tablet by mouth daily (with lunch). 10/13/21 8/18/22  Chetna Abdul MD     Allergies   Allergen Reactions    Metformin Nausea Only    Percocet [Oxycodone-Acetaminophen] Other (comments)     \"It makes me feel bad, real bad,like I have the flu. \"       Patient Active Problem List   Diagnosis Code    Glucose intolerance (impaired glucose tolerance) R73.02    Anemia D64.9    Snoring R06.83    Obstructive sleep apnea syndrome G47.33    Severe obesity (HCC) E66.01    SOB (shortness of breath) R06.02    Pain of right hip joint M25.551    Stridor R06.1    Overactive bladder N32.81    Mixed stress and urge urinary incontinence N39.46     Patient Active Problem List    Diagnosis Date Noted    Overactive bladder 06/30/2022    Mixed stress and urge urinary incontinence 06/30/2022    Stridor 11/28/2018    Severe obesity ([address] 10/10/2018    SOB (shortness of breath) 10/10/2018    Pain of right hip joint 10/10/2018    Obstructive sleep apnea syndrome 02/02/2018    Snoring 11/29/2017    Glucose intolerance (impaired glucose tolerance)     Anemia      Current Outpatient Medications   Medication Sig Dispense Refill    glipiZIDE (GLUCOTROL) 10 mg tablet Take 1 Tablet by mouth two (2) times a day. 180 Tablet 3    SITagliptin (JANUVIA) 100 mg tablet Take 1 Tablet by mouth in the morning. 90 Tablet 1    butalbital-acetaminophen-caffeine (FIORICET, ESGIC) -40 mg per tablet Take 1 Tablet by mouth every six (6) hours as needed for Headache. 30 Tablet 0    furosemide (LASIX) 20 mg tablet TAKE 1 TABLET BY MOUTH DAILY AS NEEDED FOR LEG SWELLING 30 Tablet 1    solifenacin (VESICARE) 5 mg tablet Take 1 Tablet by mouth daily. 90 Tablet 3    Omeprazole delayed release (PRILOSEC D/R) 20 mg tablet Take 1 Tablet by mouth daily. 90 Tablet 1    fexofenadine (ALLEGRA) 180 mg tablet Take 1 Tablet by mouth daily. 90 Tablet 5    bisacodyL (DULCOLAX) 5 mg EC tablet Take 5 mg by mouth. As needed      Nebulizer Accessories kit Use face mask & tubing for nebulizer machine 1 Kit 0    diclofenac EC (VOLTAREN) 50 mg EC tablet Take 1 Tab by mouth two (2) times a day. (Patient taking differently: Take 50 mg by mouth as needed.) 60 Tab 5    ciclopirox (PENLAC) 8 % solution APPLY TOPICALLY ONCE DAILY      ferrous sulfate 325 mg (65 mg iron) tablet Take  by mouth Daily (before breakfast). cholecalciferol (VITAMIN D3) (1000 Units /25 mcg) tablet Take  by mouth daily. docusate sodium (COLACE) 100 mg capsule Take 100 mg by mouth as needed. Nebulizer & Compressor machine Use every 6 hours as needed for shortness of breath 1 Each 0    multivitamin (DAILY VITAMINS PO) Take  by mouth. omega 3-DHA-EPA-fish oil (FISH OIL) 1,000 mg (120 mg-180 mg) capsule Take 1 Cap by mouth daily. Indications: high amount of triglyceride in the blood 90 Cap 1    budesonide-formoterol (SYMBICORT) 80-4.5 mcg/actuation HFAA Take 2 Puffs by inhalation two (2) times a day. (Patient taking differently: Take 2 Puffs by inhalation as needed.) 1 Inhaler 1    montelukast (SINGULAIR) 10 mg tablet Take 1 Tab by mouth daily.  27 Tab 1    latanoprost (XALATAN) 0.005 % ophthalmic solution Administer 1 Drop to both eyes nightly. Allergies   Allergen Reactions    Metformin Nausea Only    Percocet [Oxycodone-Acetaminophen] Other (comments)     \"It makes me feel bad, real bad,like I have the flu. \"     Past Medical History:   Diagnosis Date    Adverse effect of anesthesia     WITHIN 20-30 MINUTES AFTER WAKING UP FROM CARPAL TUNNEL SURGERY, HAD DIFFICULTY BREATHING ,     Anemia     Anxiety     Glucose intolerance (impaired glucose tolerance)     Menopause 2018     Past Surgical History:   Procedure Laterality Date    HX CARPAL TUNNEL RELEASE Bilateral     HX  SECTION      HX HYSTERECTOMY  2018    HX LAP CHOLECYSTECTOMY      HX OOPHORECTOMY Bilateral 2018    HX ORTHOPAEDIC Left 2015    SPUR REMOVED TO LEFT FOOT    HX ORTHOPAEDIC Left     SPIN AND SCREW IN LEFT ARM     Family History   Problem Relation Age of Onset    Stroke Mother     Diabetes Mother     Other Mother         BRAIN ANURYSM     Diabetes Father     Kidney Disease Father     Hypertension Father     Diabetes Sister     Alzheimer's Disease Maternal Grandmother     Anesth Problems Neg Hx      Social History     Tobacco Use    Smoking status: Former     Packs/day: 0.25     Years: 15.00     Pack years: 3.75     Types: Cigarettes     Quit date: 1/3/2000     Years since quittin.6    Smokeless tobacco: Never   Substance Use Topics    Alcohol use: Yes     Alcohol/week: 1.0 standard drink     Types: 1 Standard drinks or equivalent per week     Comment: OCCASSIONAL       Review of Systems   Constitutional: Negative. HENT: Negative. Eyes: Negative. Respiratory: Negative. Cardiovascular: Negative. Gastrointestinal: Negative. Genitourinary: Negative. Musculoskeletal: Negative. Skin: Negative. Neurological: Negative. Endo/Heme/Allergies: Negative. Psychiatric/Behavioral: Negative.            Objective:     Patient-Reported Vitals 8/18/2022   Patient-Reported Weight 237lbs   Patient-Reported Pulse -   Patient-Reported Temperature -   Patient-Reported SpO2 -   Patient-Reported Systolic  -   Patient-Reported Diastolic -        [INSTRUCTIONS:  \"[x]\" Indicates a positive item  \"[]\" Indicates a negative item  -- DELETE ALL ITEMS NOT EXAMINED]    Constitutional: [x] Appears well-developed and well-nourished [x] No apparent distress      [] Abnormal -     Mental status: [x] Alert and awake  [x] Oriented to person/place/time [x] Able to follow commands    [] Abnormal -     Eyes:   EOM    [x]  Normal    [] Abnormal -   Sclera  [x]  Normal    [] Abnormal -          Discharge [x]  None visible   [] Abnormal -     HENT: [x] Normocephalic, atraumatic  [] Abnormal -   [x] Mouth/Throat: Mucous membranes are moist    External Ears [x] Normal  [] Abnormal -    Neck: [x] No visualized mass [] Abnormal -     Pulmonary/Chest: [x] Respiratory effort normal   [x] No visualized signs of difficulty breathing or respiratory distress        [] Abnormal -      Musculoskeletal:   [x] Normal gait with no signs of ataxia         [x] Normal range of motion of neck        [] Abnormal -     Neurological:        [x] No Facial Asymmetry (Cranial nerve 7 motor function) (limited exam due to video visit)          [x] No gaze palsy        [] Abnormal -          Skin:        [x] No significant exanthematous lesions or discoloration noted on facial skin         [] Abnormal -            Psychiatric:       [x] Normal Affect [] Abnormal -        [x] No Hallucinations    Other pertinent observable physical exam findings:-    We discussed the expected course, resolution and complications of the diagnosis(es) in detail. Medication risks, benefits, costs, interactions, and alternatives were discussed as indicated. I advised her to contact the office if her condition worsens, changes or fails to improve as anticipated. She expressed understanding with the diagnosis(es) and plan. Norma Marina is a 63 y.o. female  is being evaluated by a Virtual Visit (video visit) encounter to address concerns as mentioned above. A caregiver was present when appropriate. Due to this being a TeleHealth encounter (During EXUGF-89 public health emergency), evaluation of the following organ systems was limited: Vitals/Constitutional/EENT/Resp/CV/GI//MS/Neuro/Skin/Heme-Lymph-Imm. Pursuant to the emergency declaration under the [address] [place] [address] and the [employer] and [employer] Act, this Virtual Visit was conducted with patient's (and/or legal guardian's) consent, to reduce the patient's risk of exposure to COVID-19 and provide necessary medical care. The patient (and/or legal guardian) has also been advised to contact this office for worsening conditions or problems, and seek emergency medical treatment and/or call 911 if deemed necessary. Patient identification was verified at the start of the visit: YES    Services were provided through a video synchronous discussion virtually to substitute for in-person clinic visit. Patient was located at their homes. Provider located in office    An electronic signature was used to authenticate this note.       Mer Osborne MD

## 2022-08-23 ENCOUNTER — HOSPITAL ENCOUNTER (OUTPATIENT)
Dept: MRI IMAGING | Age: 62
Discharge: HOME | End: 2022-08-23
Attending: ORTHOPAEDIC SURGERY
Payer: MEDICAID

## 2022-08-23 DIAGNOSIS — M48.10 DISH (DISSEMINATED IDIOPATHIC SKELETAL HYPEROSTOSIS): ICD-10-CM

## 2022-08-23 DIAGNOSIS — M54.12 CERVICAL RADICULOPATHY: ICD-10-CM

## 2022-08-23 DIAGNOSIS — M47.816 LUMBAR FACET ARTHROPATHY: ICD-10-CM

## 2022-08-23 DIAGNOSIS — M51.36 DDD (DEGENERATIVE DISC DISEASE), LUMBAR: ICD-10-CM

## 2022-08-23 DIAGNOSIS — M62.830 BACK SPASM: ICD-10-CM

## 2022-08-23 DIAGNOSIS — M54.2 NECK PAIN: ICD-10-CM

## 2022-08-23 DIAGNOSIS — M50.30 DDD (DEGENERATIVE DISC DISEASE), CERVICAL: ICD-10-CM

## 2022-08-23 DIAGNOSIS — M47.812 ARTHROPATHY OF CERVICAL FACET JOINT: ICD-10-CM

## 2022-08-23 PROCEDURE — 72148 MRI LUMBAR SPINE W/O DYE: CPT

## 2022-08-23 PROCEDURE — 72141 MRI NECK SPINE W/O DYE: CPT

## 2022-08-26 ENCOUNTER — HOSPITAL ENCOUNTER (OUTPATIENT)
Dept: NUCLEAR MEDICINE | Age: 62
Discharge: HOME | End: 2022-08-12
Attending: INTERNAL MEDICINE

## 2022-08-26 DIAGNOSIS — R73.09 ABNORMAL GLUCOSE: ICD-10-CM

## 2022-08-26 DIAGNOSIS — R19.4 ALTERED BOWEL HABITS: ICD-10-CM

## 2022-08-26 DIAGNOSIS — R13.10 DYSPHAGIA: ICD-10-CM

## 2022-08-26 DIAGNOSIS — R10.9 ABDOMINAL PAIN: ICD-10-CM

## 2022-09-01 ENCOUNTER — HOSPITAL ENCOUNTER (OUTPATIENT)
Dept: NUCLEAR MEDICINE | Age: 62
Discharge: HOME | End: 2022-09-01
Attending: INTERNAL MEDICINE
Payer: MEDICAID

## 2022-09-01 PROCEDURE — 78264 GASTRIC EMPTYING IMG STUDY: CPT

## 2022-09-01 RX ORDER — TECHNETIUM TC 99M SULFUR COLLOID 2 MG
1 KIT MISCELLANEOUS
Status: COMPLETED | OUTPATIENT
Start: 2022-09-01 | End: 2022-09-01

## 2022-09-01 RX ADMIN — TECHNETIUM TC 99M SULFUR COLLOID 1 MILLICURIE: KIT at 10:15

## 2022-09-15 ENCOUNTER — TRANSCRIBE ORDER (OUTPATIENT)
Dept: SCHEDULING | Age: 62
End: 2022-09-15

## 2022-09-15 DIAGNOSIS — M50.30 DDD (DEGENERATIVE DISC DISEASE), CERVICAL: ICD-10-CM

## 2022-09-15 DIAGNOSIS — M47.812 ARTHRITIS OF FACET JOINT OF CERVICAL SPINE: ICD-10-CM

## 2022-09-15 DIAGNOSIS — M48.10 DISH (DISSEMINATED IDIOPATHIC SKELETAL HYPEROSTOSIS): ICD-10-CM

## 2022-09-15 DIAGNOSIS — M54.12 CERVICAL RADICULOPATHY: Primary | ICD-10-CM

## 2022-09-20 DIAGNOSIS — M79.89 LEG SWELLING: ICD-10-CM

## 2022-09-20 RX ORDER — FUROSEMIDE 20 MG/1
TABLET ORAL
Qty: 30 TABLET | Refills: 1 | Status: SHIPPED | OUTPATIENT
Start: 2022-09-20 | End: 2022-10-24

## 2022-09-26 ENCOUNTER — TRANSCRIBE ORDER (OUTPATIENT)
Dept: SCHEDULING | Age: 62
End: 2022-09-26

## 2022-09-26 DIAGNOSIS — M54.12 CERVICAL RADICULOPATHY: Primary | ICD-10-CM

## 2022-09-26 DIAGNOSIS — M50.30 DDD (DEGENERATIVE DISC DISEASE), CERVICAL: ICD-10-CM

## 2022-09-26 DIAGNOSIS — M47.812 ARTHROPATHY OF CERVICAL FACET JOINT: ICD-10-CM

## 2022-10-11 ENCOUNTER — VIRTUAL VISIT (OUTPATIENT)
Dept: FAMILY MEDICINE CLINIC | Age: 62
End: 2022-10-11
Payer: MEDICAID

## 2022-10-11 DIAGNOSIS — E55.9 VITAMIN D DEFICIENCY: ICD-10-CM

## 2022-10-11 DIAGNOSIS — E78.2 MIXED HYPERLIPIDEMIA: Primary | ICD-10-CM

## 2022-10-11 DIAGNOSIS — E11.9 CONTROLLED TYPE 2 DIABETES MELLITUS WITHOUT COMPLICATION, WITHOUT LONG-TERM CURRENT USE OF INSULIN (HCC): ICD-10-CM

## 2022-10-11 PROCEDURE — 3044F HG A1C LEVEL LT 7.0%: CPT | Performed by: FAMILY MEDICINE

## 2022-10-11 PROCEDURE — 99214 OFFICE O/P EST MOD 30 MIN: CPT | Performed by: FAMILY MEDICINE

## 2022-10-11 RX ORDER — EZETIMIBE 10 MG/1
10 TABLET ORAL DAILY
Qty: 90 TABLET | Refills: 1 | Status: SHIPPED | OUTPATIENT
Start: 2022-10-11

## 2022-10-11 NOTE — PROGRESS NOTES
Chief Complaint   Patient presents with    Follow-up    Diabetes     1. Have you been to the ER, urgent care clinic since your last visit? Hospitalized since your last visit? No    2. Have you seen or consulted any other health care providers outside of the [address] [place] [address] since your last visit? Include any pap smears or colon screening.  No

## 2022-10-11 NOTE — PROGRESS NOTES
Consent: Beck Corrales, who was seen by synchronous (real-time) audio-video technology, and/or her healthcare decision maker, is aware that this patient-initiated, Telehealth encounter on 10/11/2022 is a billable service, with coverage as determined by her insurance carrier. She is aware that she may receive a bill and has provided verbal consent to proceed: Yes. Assessment & Plan:   Diagnoses and all orders for this visit:    1. Mixed hyperlipidemia  -     ezetimibe (ZETIA) 10 mg tablet; Take 1 Tablet by mouth daily.  -     METABOLIC PANEL, COMPREHENSIVE; Future  -     LIPID PANEL; Future    2. Controlled type 2 diabetes mellitus without complication, without long-term current use of insulin (HCC)  -     HEMOGLOBIN A1C WITH EAG; Future  -     METABOLIC PANEL, COMPREHENSIVE; Future    3. Vitamin D deficiency  -     VITAMIN D, 25 HYDROXY; Future          Follow-up and Dispositions    Return in about 3 months (around 1/11/2023) for follow up. I ADVISED PATIENT TO GO TO ER IF SYMPTOMS WORSEN , CHANGE OR FAILS TO IMPROVE. I spent at least 15 minutes with this established patient, and >50% of the time was spent counseling and/or coordinating care regarding SEE BELOW  [phone]  Subjective:   Beck Corrales is a 63 y.o. 1960 female  established patient, who was seen for Follow-up and Diabetes          1. Mixed hyperlipidemia  Start Zetia today. Currently not on any cholesterol medication trying to follow a low cholesterol diet.  Exercising mild    Lab Results   Component Value Date/Time    Cholesterol, total 183 05/06/2022 12:00 AM    Cholesterol (POC) 173 05/31/2017 02:21 PM    HDL Cholesterol 49 05/06/2022 12:00 AM    HDL Cholesterol (POC) 66 05/31/2017 02:21 PM    LDL Cholesterol (POC) 85 05/31/2017 02:21 PM    LDL, calculated 104 (H) 05/06/2022 12:00 AM    LDL, calculated 93.8 10/20/2021 09:04 AM    VLDL, calculated 30 05/06/2022 12:00 AM    VLDL, calculated 32.2 10/20/2021 09:04 AM Triglyceride 171 (H) 05/06/2022 12:00 AM    Triglycerides (POC) 113 05/31/2017 02:21 PM    CHOL/HDL Ratio 3.0 10/20/2021 09:04 AM        2. Controlled type 2 diabetes mellitus without complication, without long-term current use of insulin [facility])    Patient presents today for diabetes follow up. Pt. current diabetic medications include Januvia glipizide. Taking medication as prescribed. Current monitoring regimen: home blood tests - daily. Home blood sugar records: fasting range: 140s. Any episodes of hypoglycemia? no. Known diabetic complications: none. Cardiovascular risk factors: dyslipidemia, diabetes mellitus. Diabetic Foot and Eye Exam HM Status   Topic Date Due    Diabetic Foot Care  Never done    Eye Exam  Never done     There is no immunization history for the selected administration types on file for this patient. Lab Results   Component Value Date/Time    Microalbumin/Creat ratio (mg/g creat) 12 07/08/2021 02:03 PM    Microalbumin,urine random 2.13 07/08/2021 02:03 PM     Lab Results   Component Value Date/Time    Hemoglobin A1c 6.6 (H) 05/06/2022 12:00 AM    Hemoglobin A1c (POC) 5.9 (A) 10/10/2018 04:04 PM            3. Vitamin D deficiency  Taking vitamin D supplement. Check labs       Prior to Admission medications    Medication Sig Start Date End Date Taking? Authorizing Provider   ezetimibe (ZETIA) 10 mg tablet Take 1 Tablet by mouth daily. 10/11/22  Yes Jessi Doyle MD   glipiZIDE (GLUCOTROL) 10 mg tablet Take 1 Tablet by mouth two (2) times a day. 8/18/22  Yes Jessi Doyle MD   SITagliptin (JANUVIA) 100 mg tablet Take 1 Tablet by mouth in the morning.  8/9/22  Yes Jessi Doyle MD   furosemide (LASIX) 20 mg tablet TAKE 1 TABLET BY MOUTH DAILY AS NEEDED FOR LEG SWELLING 9/20/22   Jessi Doyle MD   butalbital-acetaminophen-caffeine (FIORICET, ESGIC) -40 mg per tablet Take 1 Tablet by mouth every six (6) hours as needed for Headache. 8/9/22   Jessi Doyle MD   solifenacin (VESICARE) 5 mg tablet Take 1 Tablet by mouth daily. 6/30/22   Sepideh Munroe MD   Omeprazole delayed release (PRILOSEC D/R) 20 mg tablet Take 1 Tablet by mouth daily. 5/6/22   Jesusita Obiren NP   fexofenadine (ALLEGRA) 180 mg tablet Take 1 Tablet by mouth daily. 10/13/21   Sepideh Munroe MD   bisacodyL (DULCOLAX) 5 mg EC tablet Take 5 mg by mouth. As needed    Provider, Historical   Nebulizer Accessories kit Use face mask & tubing for nebulizer machine 12/21/20   Sepideh Munroe MD   diclofenac EC (VOLTAREN) 50 mg EC tablet Take 1 Tab by mouth two (2) times a day. Patient taking differently: Take 50 mg by mouth as needed. 12/15/20   Sepideh Munroe MD   ciclopirox (PENLAC) 8 % solution APPLY TOPICALLY ONCE DAILY 9/28/20   Provider, Historical   ferrous sulfate 325 mg (65 mg iron) tablet Take  by mouth Daily (before breakfast). Provider, Historical   cholecalciferol (VITAMIN D3) (1000 Units /25 mcg) tablet Take  by mouth daily. Provider, Historical   docusate sodium (COLACE) 100 mg capsule Take 100 mg by mouth as needed. Provider, Historical   Nebulizer & Compressor machine Use every 6 hours as needed for shortness of breath 1/29/20   Sepideh Munroe MD   multivitamin (DAILY VITAMINS PO) Take  by mouth. Provider, Historical   omega 3-DHA-EPA-fish oil (FISH OIL) 1,000 mg (120 mg-180 mg) capsule Take 1 Cap by mouth daily. Indications: high amount of triglyceride in the blood 1/21/20   Sepideh Munroe MD   budesonide-formoterol (SYMBICORT) 80-4.5 mcg/actuation HFAA Take 2 Puffs by inhalation two (2) times a day. Patient taking differently: Take 2 Puffs by inhalation as needed. 11/29/18   Bishop Deanna MD   montelukast (SINGULAIR) 10 mg tablet Take 1 Tab by mouth daily. 11/29/18   Bishop Deanna MD   latanoprost (XALATAN) 0.005 % ophthalmic solution Administer 1 Drop to both eyes nightly.     Provider, Historical     Allergies   Allergen Reactions    Metformin Nausea Only    Percocet [Oxycodone-Acetaminophen] Other (comments)     \"It makes me feel bad, real bad,like I have the flu. \"       Patient Active Problem List   Diagnosis Code    Glucose intolerance (impaired glucose tolerance) R73.02    Anemia D64.9    Snoring R06.83    Obstructive sleep apnea syndrome G47.33    Severe obesity (HCC) E66.01    SOB (shortness of breath) R06.02    Pain of right hip joint M25.551    Stridor R06.1    Overactive bladder N32.81    Mixed stress and urge urinary incontinence N39.46     Patient Active Problem List    Diagnosis Date Noted    Overactive bladder 06/30/2022    Mixed stress and urge urinary incontinence 06/30/2022    Stridor 11/28/2018    Severe obesity ([address] 10/10/2018    SOB (shortness of breath) 10/10/2018    Pain of right hip joint 10/10/2018    Obstructive sleep apnea syndrome 02/02/2018    Snoring 11/29/2017    Glucose intolerance (impaired glucose tolerance)     Anemia      Current Outpatient Medications   Medication Sig Dispense Refill    ezetimibe (ZETIA) 10 mg tablet Take 1 Tablet by mouth daily. 90 Tablet 1    glipiZIDE (GLUCOTROL) 10 mg tablet Take 1 Tablet by mouth two (2) times a day. 180 Tablet 3    SITagliptin (JANUVIA) 100 mg tablet Take 1 Tablet by mouth in the morning. 90 Tablet 1    furosemide (LASIX) 20 mg tablet TAKE 1 TABLET BY MOUTH DAILY AS NEEDED FOR LEG SWELLING 30 Tablet 1    butalbital-acetaminophen-caffeine (FIORICET, ESGIC) -40 mg per tablet Take 1 Tablet by mouth every six (6) hours as needed for Headache. 30 Tablet 0    solifenacin (VESICARE) 5 mg tablet Take 1 Tablet by mouth daily. 90 Tablet 3    Omeprazole delayed release (PRILOSEC D/R) 20 mg tablet Take 1 Tablet by mouth daily. 90 Tablet 1    fexofenadine (ALLEGRA) 180 mg tablet Take 1 Tablet by mouth daily. 90 Tablet 5    bisacodyL (DULCOLAX) 5 mg EC tablet Take 5 mg by mouth.  As needed      Nebulizer Accessories kit Use face mask & tubing for nebulizer machine 1 Kit 0    diclofenac EC (VOLTAREN) 50 mg EC tablet Take 1 Tab by mouth two (2) times a day. (Patient taking differently: Take 50 mg by mouth as needed.) 60 Tab 5    ciclopirox (PENLAC) 8 % solution APPLY TOPICALLY ONCE DAILY      ferrous sulfate 325 mg (65 mg iron) tablet Take  by mouth Daily (before breakfast). cholecalciferol (VITAMIN D3) (1000 Units /25 mcg) tablet Take  by mouth daily. docusate sodium (COLACE) 100 mg capsule Take 100 mg by mouth as needed. Nebulizer & Compressor machine Use every 6 hours as needed for shortness of breath 1 Each 0    multivitamin (DAILY VITAMINS PO) Take  by mouth. omega 3-DHA-EPA-fish oil (FISH OIL) 1,000 mg (120 mg-180 mg) capsule Take 1 Cap by mouth daily. Indications: high amount of triglyceride in the blood 90 Cap 1    budesonide-formoterol (SYMBICORT) 80-4.5 mcg/actuation HFAA Take 2 Puffs by inhalation two (2) times a day. (Patient taking differently: Take 2 Puffs by inhalation as needed.) 1 Inhaler 1    montelukast (SINGULAIR) 10 mg tablet Take 1 Tab by mouth daily. 30 Tab 1    latanoprost (XALATAN) 0.005 % ophthalmic solution Administer 1 Drop to both eyes nightly. Allergies   Allergen Reactions    Metformin Nausea Only    Percocet [Oxycodone-Acetaminophen] Other (comments)     \"It makes me feel bad, real bad,like I have the flu. \"       Review of Systems   Constitutional: Negative. HENT: Negative. Eyes: Negative. Respiratory: Negative. Cardiovascular: Negative. Gastrointestinal: Negative. Genitourinary: Negative. Musculoskeletal: Negative. Skin: Negative. Neurological: Negative. Endo/Heme/Allergies: Negative. Psychiatric/Behavioral: Negative.            Objective:     Patient-Reported Vitals 10/11/2022   Patient-Reported Weight 231 lbs   Patient-Reported Pulse -   Patient-Reported Temperature -   Patient-Reported SpO2 -   Patient-Reported Systolic  -   Patient-Reported Diastolic -        [INSTRUCTIONS:  \"[x]\" Indicates a positive item  \"[]\" Indicates a negative item  -- DELETE ALL ITEMS NOT EXAMINED]    Constitutional: [x] Appears well-developed and well-nourished [x] No apparent distress      [] Abnormal -     Mental status: [x] Alert and awake  [x] Oriented to person/place/time [x] Able to follow commands    [] Abnormal -     Eyes:   EOM    [x]  Normal    [] Abnormal -   Sclera  [x]  Normal    [] Abnormal -          Discharge [x]  None visible   [] Abnormal -     HENT: [x] Normocephalic, atraumatic  [] Abnormal -   [x] Mouth/Throat: Mucous membranes are moist    External Ears [x] Normal  [] Abnormal -    Neck: [x] No visualized mass [] Abnormal -     Pulmonary/Chest: [x] Respiratory effort normal   [x] No visualized signs of difficulty breathing or respiratory distress        [] Abnormal -      Musculoskeletal:   [x] Normal gait with no signs of ataxia         [x] Normal range of motion of neck        [] Abnormal -     Neurological:        [x] No Facial Asymmetry (Cranial nerve 7 motor function) (limited exam due to video visit)          [x] No gaze palsy        [] Abnormal -          Skin:        [x] No significant exanthematous lesions or discoloration noted on facial skin         [] Abnormal -            Psychiatric:       [x] Normal Affect [] Abnormal -        [x] No Hallucinations    Other pertinent observable physical exam findings:-    We discussed the expected course, resolution and complications of the diagnosis(es) in detail. Medication risks, benefits, costs, interactions, and alternatives were discussed as indicated. I advised her to contact the office if her condition worsens, changes or fails to improve as anticipated. She expressed understanding with the diagnosis(es) and plan. Hermilo Pettit is a 63 y.o. female  is being evaluated by a Virtual Visit (video visit) encounter to address concerns as mentioned above. A caregiver was present when appropriate.  Due to this being a TeleHealth encounter (During ZBWN-35 public health emergency), evaluation of the following organ systems was limited: Vitals/Constitutional/EENT/Resp/CV/GI//MS/Neuro/Skin/Heme-Lymph-Imm. Pursuant to the emergency declaration under the [facility], [address] [place] [address] authority and the [employer] Act, this Virtual Visit was conducted with patient's (and/or legal guardian's) consent, to reduce the patient's risk of exposure to COVID-19 and provide necessary medical care. The patient (and/or legal guardian) has also been advised to contact this office for worsening conditions or problems, and seek emergency medical treatment and/or call 911 if deemed necessary. Patient identification was verified at the start of the visit: YES    Services were provided through a video synchronous discussion virtually to substitute for in-person clinic visit. Patient was located at their homes. Provider located in office    An electronic signature was used to authenticate this note.       Zoe Hernandez MD

## 2022-10-13 ENCOUNTER — HOSPITAL ENCOUNTER (OUTPATIENT)
Dept: CT IMAGING | Age: 62
Discharge: HOME | End: 2022-10-13
Attending: ORTHOPAEDIC SURGERY
Payer: MEDICAID

## 2022-10-13 DIAGNOSIS — M50.30 DDD (DEGENERATIVE DISC DISEASE), CERVICAL: ICD-10-CM

## 2022-10-13 DIAGNOSIS — M54.12 CERVICAL RADICULOPATHY: ICD-10-CM

## 2022-10-13 DIAGNOSIS — M47.812 ARTHROPATHY OF CERVICAL FACET JOINT: ICD-10-CM

## 2022-10-13 PROCEDURE — 72125 CT NECK SPINE W/O DYE: CPT

## 2022-10-20 DIAGNOSIS — M79.89 LEG SWELLING: ICD-10-CM

## 2022-10-24 RX ORDER — FUROSEMIDE 20 MG/1
TABLET ORAL
Qty: 30 TABLET | Refills: 1 | Status: SHIPPED | OUTPATIENT
Start: 2022-10-24

## 2022-11-18 DIAGNOSIS — M79.89 LEG SWELLING: ICD-10-CM

## 2022-12-12 RX ORDER — FUROSEMIDE 20 MG/1
TABLET ORAL
Qty: 30 TABLET | Refills: 1 | Status: SHIPPED | OUTPATIENT
Start: 2022-12-12

## 2022-12-18 DIAGNOSIS — E11.9 CONTROLLED TYPE 2 DIABETES MELLITUS WITHOUT COMPLICATION, WITHOUT LONG-TERM CURRENT USE OF INSULIN (HCC): ICD-10-CM

## 2022-12-19 RX ORDER — GLIPIZIDE 10 MG/1
10 TABLET ORAL 2 TIMES DAILY
Qty: 180 TABLET | Refills: 3 | Status: SHIPPED | OUTPATIENT
Start: 2022-12-19

## 2022-12-19 NOTE — TELEPHONE ENCOUNTER
Provider out of office   Please advise     PCP: Caryn Albarran MD    Last appt: 10/11/2022  No future appointments. Requested Prescriptions     Pending Prescriptions Disp Refills    glipiZIDE (GLUCOTROL) 10 mg tablet 180 Tablet 3     Sig: Take 1 Tablet by mouth two (2) times a day.        Prior labs and Blood pressures:  BP Readings from Last 3 Encounters:   05/06/22 133/76   10/13/21 130/82   07/08/21 132/85     Lab Results   Component Value Date/Time    Sodium 140 05/06/2022 12:00 AM    Potassium 4.6 05/06/2022 12:00 AM    Chloride 101 05/06/2022 12:00 AM    CO2 24 05/06/2022 12:00 AM    Anion gap 6 10/20/2021 09:04 AM    Glucose 250 (H) 05/06/2022 12:00 AM    BUN 10 05/06/2022 12:00 AM    Creatinine 0.77 05/06/2022 12:00 AM    BUN/Creatinine ratio 13 05/06/2022 12:00 AM    GFR est AA >60 10/20/2021 09:04 AM    GFR est non-AA >60 10/20/2021 09:04 AM    Calcium 9.8 05/06/2022 12:00 AM     Lab Results   Component Value Date/Time    Hemoglobin A1c 6.6 (H) 05/06/2022 12:00 AM    Hemoglobin A1c (POC) 5.9 (A) 10/10/2018 04:04 PM    Hemoglobin A1c, External 7.6 07/21/2022 12:00 AM     Lab Results   Component Value Date/Time    Cholesterol, total 183 05/06/2022 12:00 AM    Cholesterol (POC) 173 05/31/2017 02:21 PM    HDL Cholesterol 49 05/06/2022 12:00 AM    HDL Cholesterol (POC) 66 05/31/2017 02:21 PM    LDL Cholesterol (POC) 85 05/31/2017 02:21 PM    LDL, calculated 104 (H) 05/06/2022 12:00 AM    LDL, calculated 93.8 10/20/2021 09:04 AM    VLDL, calculated 30 05/06/2022 12:00 AM    VLDL, calculated 32.2 10/20/2021 09:04 AM    Triglyceride 171 (H) 05/06/2022 12:00 AM    Triglycerides (POC) 113 05/31/2017 02:21 PM    CHOL/HDL Ratio 3.0 10/20/2021 09:04 AM     Lab Results   Component Value Date/Time    Vitamin D 25-Hydroxy 25.2 (L) 10/06/2020 10:52 AM    VITAMIN D, 25-HYDROXY 23.4 (L) 05/06/2022 12:00 AM       Lab Results   Component Value Date/Time    TSH 1.300 05/20/2021 09:19 AM    TSH 1.00 12/19/2019 11:15 AM

## 2023-01-20 DIAGNOSIS — M79.89 LEG SWELLING: ICD-10-CM

## 2023-01-23 RX ORDER — FUROSEMIDE 20 MG/1
TABLET ORAL
Qty: 30 TABLET | Refills: 1 | Status: SHIPPED | OUTPATIENT
Start: 2023-01-23

## 2023-02-03 DIAGNOSIS — E11.9 CONTROLLED TYPE 2 DIABETES MELLITUS WITHOUT COMPLICATION, WITHOUT LONG-TERM CURRENT USE OF INSULIN (HCC): ICD-10-CM

## 2023-02-06 RX ORDER — SITAGLIPTIN 100 MG/1
TABLET, FILM COATED ORAL
Qty: 30 TABLET | Refills: 5 | Status: SHIPPED | OUTPATIENT
Start: 2023-02-06

## 2023-02-07 ENCOUNTER — OFFICE VISIT (OUTPATIENT)
Dept: FAMILY MEDICINE CLINIC | Age: 63
End: 2023-02-07
Payer: MEDICAID

## 2023-02-07 VITALS
HEIGHT: 66 IN | RESPIRATION RATE: 20 BRPM | WEIGHT: 236.2 LBS | OXYGEN SATURATION: 99 % | HEART RATE: 80 BPM | SYSTOLIC BLOOD PRESSURE: 133 MMHG | DIASTOLIC BLOOD PRESSURE: 81 MMHG | BODY MASS INDEX: 37.96 KG/M2 | TEMPERATURE: 97.8 F

## 2023-02-07 DIAGNOSIS — E55.9 VITAMIN D DEFICIENCY: ICD-10-CM

## 2023-02-07 DIAGNOSIS — E78.2 MIXED HYPERLIPIDEMIA: ICD-10-CM

## 2023-02-07 DIAGNOSIS — Z11.1 SCREENING-PULMONARY TB: Primary | ICD-10-CM

## 2023-02-07 DIAGNOSIS — E11.9 CONTROLLED TYPE 2 DIABETES MELLITUS WITHOUT COMPLICATION, WITHOUT LONG-TERM CURRENT USE OF INSULIN (HCC): ICD-10-CM

## 2023-02-07 DIAGNOSIS — K31.84 GASTROPARESIS: ICD-10-CM

## 2023-02-07 LAB
MM INDURATION POC: 0 MM (ref 0–5)
PPD POC: NEGATIVE NEGATIVE

## 2023-02-07 PROCEDURE — 99214 OFFICE O/P EST MOD 30 MIN: CPT | Performed by: FAMILY MEDICINE

## 2023-02-07 PROCEDURE — 86580 TB INTRADERMAL TEST: CPT | Performed by: FAMILY MEDICINE

## 2023-02-07 RX ORDER — GABAPENTIN 100 MG/1
100 CAPSULE ORAL 2 TIMES DAILY
COMMUNITY
Start: 2022-12-21

## 2023-02-07 RX ORDER — METOCLOPRAMIDE 5 MG/1
TABLET ORAL
COMMUNITY
Start: 2022-09-06

## 2023-02-07 RX ORDER — MELOXICAM 7.5 MG/1
7.5 TABLET ORAL DAILY
COMMUNITY
Start: 2022-12-28

## 2023-02-07 RX ORDER — VIBEGRON 75 MG/1
TABLET, FILM COATED ORAL
COMMUNITY
Start: 2023-02-04

## 2023-02-07 RX ORDER — TIZANIDINE 2 MG/1
2 TABLET ORAL
COMMUNITY
Start: 2022-08-11

## 2023-02-07 RX ORDER — DICYCLOMINE HYDROCHLORIDE 20 MG/1
TABLET ORAL
COMMUNITY
Start: 2022-07-21

## 2023-02-07 NOTE — PROGRESS NOTES
Patient stated name &   Chief Complaint   Patient presents with    Other     PPD Placement        Dose amount:  0.1 ml  Injection site:  Left forearm  Route:  Intradermal  Lot:  [id number]  Exp:  25  NDC:  41849-822-09  [employer]    To return on Thursday to be read. Patient tolerated injection well. No adverse reactions noted. Health Maintenance Due   Topic    Pneumococcal 0-64 years (1 - PCV)    Foot Exam Q1     Diabetic Alb to Cr ratio (uACR) test     Depression Screen     Shingles Vaccine (2 of 2)    COVID-19 Vaccine (5 - Booster for Moderna series)       Wt Readings from Last 3 Encounters:   23 236 lb 3.2 oz (107.1 kg)   22 242 lb 3.2 oz (109.9 kg)   10/13/21 245 lb 6.4 oz (111.3 kg)     Temp Readings from Last 3 Encounters:   23 97.8 °F (36.6 °C) (Temporal)   22 98.2 °F (36.8 °C) (Temporal)   10/13/21 98 °F (36.7 °C) (Temporal)     BP Readings from Last 3 Encounters:   23 133/81   22 133/76   10/13/21 130/82     Pulse Readings from Last 3 Encounters:   23 80   22 75   10/13/21 82         Learning Assessment:  :     Learning Assessment 2020 3/31/2020   PRIMARY LEARNER Patient Patient   Olya Pals CAREGIVER No No   PRIMARY LANGUAGE ENGLISH ENGLISH   LEARNER PREFERENCE PRIMARY DEMONSTRATION DEMONSTRATION   ANSWERED BY patient  patient    RELATIONSHIP SELF SELF       Depression Screening:  :     3 most recent PHQ Screens 10/13/2021   Little interest or pleasure in doing things Not at all   Feeling down, depressed, irritable, or hopeless Not at all   Total Score PHQ 2 0       Fall Risk Assessment:  :     Fall Risk Assessment, last 12 mths 2021   Able to walk? Yes   Fall in past 12 months? 0   Do you feel unsteady? 0   Are you worried about falling 0       Abuse Screening:  :     Abuse Screening Questionnaire 2021   Do you ever feel afraid of your partner? N   Are you in a relationship with someone who physically or mentally threatens you?  N   Is it safe for you to go home? Y       Coordination of Care Questionnaire:  :   1. \"Have you been to the ER, urgent care clinic since your last visit? Hospitalized since your last visit? \" No    2. \"Have you seen or consulted any other health care providers outside of the [address] [place] [address] since your last visit? \" No     3. For patients aged 39-70: Has the patient had a colonoscopy / FIT/ Cologuard? No      If the patient is female:    4. For patients aged 41-77: Has the patient had a mammogram within the past 2 years? No      5. For patients aged 21-65: Has the patient had a pap smear? No     3) Do you have an Advance Directive on file? no  Are you interested in receiving information about Advance Directives? no    Patient is accompanied by self I have received verbal consent from Estela Reynoso to discuss any/all medical information while they are present in the room. 0

## 2023-02-07 NOTE — PROGRESS NOTES
2023   Denny Molina (: 1960) is a 66 y.o. female, established patient, here for evaluation of the following chief complaint(s): Other (PPD Placement)     ASSESSMENT/PLAN:  Below is the assessment and plan developed based on review of pertinent history, physical exam, labs, studies, and medications. 1. Screening-pulmonary TB  -     AMB POC TUBERCULOSIS, INTRADERMAL (SKIN TEST)  2. Gastroparesis  -     METABOLIC PANEL, COMPREHENSIVE; Future  3. Controlled type 2 diabetes mellitus without complication, without long-term current use of insulin ([facility])  -     HEMOGLOBIN A1C WITH EAG; Future  -     URINALYSIS W/ REFLEX CULTURE; Future  -     MICROALBUMIN, UR, RAND W/ MICROALB/CREAT RATIO; Future  -     CBC WITH AUTOMATED DIFF; Future  -     METABOLIC PANEL, COMPREHENSIVE; Future  4. Vitamin D deficiency  -     VITAMIN D, 25 HYDROXY; Future  5. Mixed hyperlipidemia  -     METABOLIC PANEL, COMPREHENSIVE; Future  -     LIPID PANEL; Future    Return in about 2 weeks (around 2023) for follow up, DIABETES, discuss labs. SUBJECTIVE/OBJECTIVE:  HPI     1. Screening-pulmonary TB  Patient is here to get PPD placed. Denies any symptoms of active TB. Denies cough, fevers sputum production, weight loss, night sweats, fever or exposure to known TB patient. 2. Gastroparesis  Patient reports she has been diagnosed with gastroparesis. 3. Controlled type 2 diabetes mellitus without complication, without long-term current use of insulin ([address]  Diabetes  Patient presents today for diabetes follow up. Pt. current diabetic medications include Januvia and glipizide. Taking medication as prescribed. We discussed switching from Januvia to Trulicity but patient declined. Patient is currently not on metformin due to side effects of nausea.     Diabetic Foot and Eye Exam HM Status   Topic Date Due    Diabetic Foot Care  Never done     There is no immunization history for the selected administration types on file for this patient. Lab Results   Component Value Date/Time    Microalbumin/Creat ratio (mg/g creat) 12 07/08/2021 02:03 PM    Microalbumin,urine random 2.13 07/08/2021 02:03 PM     Lab Results   Component Value Date/Time    Hemoglobin A1c 6.6 (H) 05/06/2022 12:00 AM    Hemoglobin A1c (POC) 5.9 (A) 10/10/2018 04:04 PM    Hemoglobin A1c, External 7.6 07/21/2022 12:00 AM            4. Vitamin D deficiency  Check vitamin D level    5. Mixed hyperlipidemia  Check cholesterol       Results for orders placed or performed in visit on 02/07/23   AMB POC TUBERCULOSIS, INTRADERMAL (SKIN TEST)   Result Value Ref Range    PPD      mm Induration                  Review of Systems   Constitutional: Negative. HENT: Negative. Eyes: Negative. Respiratory: Negative. Cardiovascular: Negative. Gastrointestinal: Negative. Genitourinary: Negative. Musculoskeletal: Negative. Skin: Negative. Neurological: Negative. Endo/Heme/Allergies: Negative. Psychiatric/Behavioral: Negative. Physical Exam  Vitals and nursing note reviewed. HENT:      Head: Normocephalic and atraumatic. Right Ear: External ear normal.      Left Ear: External ear normal.      Nose: Nose normal.   Eyes:      Conjunctiva/sclera: Conjunctivae normal.   Cardiovascular:      Rate and Rhythm: Normal rate and regular rhythm. Pulmonary:      Effort: Pulmonary effort is normal.      Breath sounds: Normal breath sounds. Abdominal:      General: Bowel sounds are normal. There is no distension. Palpations: Abdomen is soft. Tenderness: There is no abdominal tenderness. Musculoskeletal:         General: Normal range of motion. Cervical back: Normal range of motion and neck supple. Right lower leg: No edema. Left lower leg: No edema. Skin:     General: Skin is warm and dry. Neurological:      General: No focal deficit present. Mental Status: She is alert.      /81 (BP 1 Location: Left upper arm, BP Patient Position: Sitting, BP Cuff Size: Adult)   Pulse 80   Temp 97.8 °F (36.6 °C) (Temporal)   Resp 20   Ht 5' 6\" (1.676 m)   Wt 236 lb 3.2 oz (107.1 kg)   SpO2 99%   BMI 38.12 kg/m²     Allergies   Allergen Reactions    Metformin Nausea Only    Percocet [Oxycodone-Acetaminophen] Other (comments)     \"It makes me feel bad, real bad,like I have the flu. \"       Current Outpatient Medications   Medication Sig    dicyclomine (BENTYL) 20 mg tablet TAKE ONE TABLET BY MOUTH NO SOONER THAN EVERY SIX HOURS AS NEEDED FOR ABDOMINAL CRAMPING    gabapentin (NEURONTIN) 100 mg capsule Take 100 mg by mouth two (2) times a day. meloxicam (MOBIC) 7.5 mg tablet Take 7.5 mg by mouth daily. metoclopramide HCl (REGLAN) 5 mg tablet TAKE ONE TABLET BY MOUTH 30 MINUTES BEFORE A MEAL NO MORE THAN THREE TIMES A DAY FOR GASTROPARESIS    tiZANidine (ZANAFLEX) 2 mg tablet Take 2 mg by mouth every eight (8) hours as needed. Gemtesa 75 mg tablet     Januvia 100 mg tablet TAKE 1 TABLET BY MOUTH EVERY DAY IN THE MORNING    furosemide (LASIX) 20 mg tablet TAKE 1 TABLET BY MOUTH DAILY AS NEEDED FOR LEG SWELLING    omeprazole (PRILOSEC) 20 mg capsule TAKE 1 CAPSULE BY MOUTH EVERY DAY    glipiZIDE (GLUCOTROL) 10 mg tablet Take 1 Tablet by mouth two (2) times a day.    ezetimibe (ZETIA) 10 mg tablet Take 1 Tablet by mouth daily. butalbital-acetaminophen-caffeine (FIORICET, ESGIC) -40 mg per tablet Take 1 Tablet by mouth every six (6) hours as needed for Headache.    solifenacin (VESICARE) 5 mg tablet Take 1 Tablet by mouth daily. fexofenadine (ALLEGRA) 180 mg tablet Take 1 Tablet by mouth daily. bisacodyL (DULCOLAX) 5 mg EC tablet Take 5 mg by mouth. As needed    diclofenac EC (VOLTAREN) 50 mg EC tablet Take 1 Tab by mouth two (2) times a day.  (Patient taking differently: Take 50 mg by mouth as needed.)    ciclopirox (PENLAC) 8 % solution APPLY TOPICALLY ONCE DAILY    ferrous sulfate 325 mg (65 mg iron) tablet Take  by mouth Daily (before breakfast). cholecalciferol (VITAMIN D3) (1000 Units /25 mcg) tablet Take  by mouth daily. docusate sodium (COLACE) 100 mg capsule Take 100 mg by mouth as needed. multivitamin (DAILY VITAMINS PO) Take  by mouth. omega 3-DHA-EPA-fish oil (FISH OIL) 1,000 mg (120 mg-180 mg) capsule Take 1 Cap by mouth daily. Indications: high amount of triglyceride in the blood    budesonide-formoterol (SYMBICORT) 80-4.5 mcg/actuation HFAA Take 2 Puffs by inhalation two (2) times a day. (Patient taking differently: Take 2 Puffs by inhalation as needed.)    latanoprost (XALATAN) 0.005 % ophthalmic solution Administer 1 Drop to both eyes nightly. Nebulizer Accessories kit Use face mask & tubing for nebulizer machine (Patient not taking: Reported on 2023)    Nebulizer & Compressor machine Use every 6 hours as needed for shortness of breath (Patient not taking: Reported on 2023)    montelukast (SINGULAIR) 10 mg tablet Take 1 Tab by mouth daily. (Patient not taking: Reported on 2023)     No current facility-administered medications for this visit. Past Medical History:   Diagnosis Date    Adverse effect of anesthesia     WITHIN 20-30 MINUTES AFTER WAKING UP FROM CARPAL TUNNEL SURGERY, HAD DIFFICULTY BREATHING ,     Anemia     Anxiety     Glucose intolerance (impaired glucose tolerance)     Menopause 2018       Past Surgical History:   Procedure Laterality Date    HX CARPAL TUNNEL RELEASE Bilateral     HX  SECTION      HX HYSTERECTOMY  2018    HX LAP CHOLECYSTECTOMY      HX OOPHORECTOMY Bilateral 2018    HX ORTHOPAEDIC Left 2015    SPUR REMOVED TO LEFT FOOT    HX ORTHOPAEDIC Left     SPIN AND SCREW IN LEFT ARM       Social History:  reports that she quit smoking about 23 years ago. She has a 3.75 pack-year smoking history. She has never used smokeless tobacco. She reports current alcohol use of about 1.0 standard drink per week.  She reports that she does not use drugs. Patient Care Team:  Avinash Sales MD as PCP - General (Family Medicine)  Avinash Sales MD as PCP - [facility]  Escobar Pineda, [address] (Registered Dietitian or Nutritional Professional)  Merlinda Standard, MD (Gastroenterology)  Ada Bailon as Physician (Gastroenterology)  Merlinda Standard, MD (Gastroenterology)    Problem List  Date Reviewed: 2/7/2023            Codes Class Noted    Gastroparesis ICD-10-CM: K31.84  ICD-9-CM: 536.3  2/7/2023        Overactive bladder ICD-10-CM: N32.81  ICD-9-CM: 596.51  6/30/2022        Mixed stress and urge urinary incontinence ICD-10-CM: N39.46  ICD-9-CM: 788.33  6/30/2022        Stridor ICD-10-CM: R06.1  ICD-9-CM: 786.1  11/28/2018        Severe obesity ([address] ICD-10-CM: E66.01  ICD-9-CM: 278.01  10/10/2018        SOB (shortness of breath) ICD-10-CM: R06.02  ICD-9-CM: 786.05  10/10/2018        Pain of right hip joint ICD-10-CM: M25.551  ICD-9-CM: 719.45  10/10/2018        Obstructive sleep apnea syndrome ICD-10-CM: G47.33  ICD-9-CM: 327.23  2/2/2018        Snoring ICD-10-CM: R06.83  ICD-9-CM: 786.09  11/29/2017        Glucose intolerance (impaired glucose tolerance) ICD-10-CM: R73.02  ICD-9-CM: 790.22  Unknown        Anemia ICD-10-CM: D64.9  ICD-9-CM: 285. 9  Unknown                I ADVISED PATIENT TO GO TO ER IF SYMPTOMS WORSEN , CHANGE OR FAILS TO IMPROVE. I have discussed the diagnosis with the patient and the intended plan as seen in the above orders. The patient has received an after-visit summary and questions were answered concerning future plans. I have discussed medication side effects and warnings with the patient as well. The patient agrees and understands above plan. An electronic signature was used to authenticate this note.   -- Elisa Donald MD

## 2023-02-09 ENCOUNTER — CLINICAL SUPPORT (OUTPATIENT)
Dept: FAMILY MEDICINE CLINIC | Age: 63
End: 2023-02-09

## 2023-02-09 DIAGNOSIS — Z11.1 ENCOUNTER FOR PPD SKIN TEST READING: Primary | ICD-10-CM

## 2023-02-09 NOTE — PROGRESS NOTES
PPD Reading Note    Ms. Rigoberto Vazquez returned today to have her PPD read at 8:00am.    Result: 0 mm induration.   Interpretation: Negative  Allergic reaction: no        Cgleave, cma

## 2023-02-15 DIAGNOSIS — E78.2 MIXED HYPERLIPIDEMIA: ICD-10-CM

## 2023-02-15 DIAGNOSIS — K31.84 GASTROPARESIS: ICD-10-CM

## 2023-02-15 DIAGNOSIS — E55.9 VITAMIN D DEFICIENCY: ICD-10-CM

## 2023-02-15 DIAGNOSIS — E11.9 CONTROLLED TYPE 2 DIABETES MELLITUS WITHOUT COMPLICATION, WITHOUT LONG-TERM CURRENT USE OF INSULIN (HCC): ICD-10-CM

## 2023-02-16 LAB
25(OH)D3 SERPL-MCNC: 47.5 NG/ML (ref 30–100)
ALBUMIN SERPL-MCNC: 3.9 G/DL (ref 3.5–5)
ALBUMIN/GLOB SERPL: 1.1 (ref 1.1–2.2)
ALP SERPL-CCNC: 143 U/L (ref 45–117)
ALT SERPL-CCNC: 48 U/L (ref 12–78)
ANION GAP SERPL CALC-SCNC: 8 MMOL/L (ref 5–15)
APPEARANCE UR: ABNORMAL
AST SERPL-CCNC: 26 U/L (ref 15–37)
BACTERIA URNS QL MICRO: ABNORMAL /HPF
BASOPHILS # BLD: 0.1 K/UL (ref 0–0.1)
BASOPHILS NFR BLD: 1 % (ref 0–1)
BILIRUB SERPL-MCNC: 0.5 MG/DL (ref 0.2–1)
BILIRUB UR QL: NEGATIVE
BUN SERPL-MCNC: 12 MG/DL (ref 6–20)
BUN/CREAT SERPL: 13 (ref 12–20)
CALCIUM SERPL-MCNC: 9.6 MG/DL (ref 8.5–10.1)
CHLORIDE SERPL-SCNC: 104 MMOL/L (ref 97–108)
CHOLEST SERPL-MCNC: 209 MG/DL
CO2 SERPL-SCNC: 26 MMOL/L (ref 21–32)
COLOR UR: ABNORMAL
CREAT SERPL-MCNC: 0.9 MG/DL (ref 0.55–1.02)
CREAT UR-MCNC: 214 MG/DL
DIFFERENTIAL METHOD BLD: NORMAL
EOSINOPHIL # BLD: 0.2 K/UL (ref 0–0.4)
EOSINOPHIL NFR BLD: 2 % (ref 0–7)
EPITH CASTS URNS QL MICRO: ABNORMAL /LPF
ERYTHROCYTE [DISTWIDTH] IN BLOOD BY AUTOMATED COUNT: 11.6 % (ref 11.5–14.5)
EST. AVERAGE GLUCOSE BLD GHB EST-MCNC: 197 MG/DL
GLOBULIN SER CALC-MCNC: 3.6 G/DL (ref 2–4)
GLUCOSE SERPL-MCNC: 234 MG/DL (ref 65–100)
GLUCOSE UR STRIP.AUTO-MCNC: 250 MG/DL
HBA1C MFR BLD: 8.5 % (ref 4–5.6)
HCT VFR BLD AUTO: 40.5 % (ref 35–47)
HDLC SERPL-MCNC: 63 MG/DL
HDLC SERPL: 3.3 (ref 0–5)
HGB BLD-MCNC: 12.4 G/DL (ref 11.5–16)
HGB UR QL STRIP: NEGATIVE
IMM GRANULOCYTES # BLD AUTO: 0 K/UL (ref 0–0.04)
IMM GRANULOCYTES NFR BLD AUTO: 0 % (ref 0–0.5)
KETONES UR QL STRIP.AUTO: NEGATIVE MG/DL
LDLC SERPL CALC-MCNC: 121.4 MG/DL (ref 0–100)
LEUKOCYTE ESTERASE UR QL STRIP.AUTO: NEGATIVE
LYMPHOCYTES # BLD: 1.5 K/UL (ref 0.8–3.5)
LYMPHOCYTES NFR BLD: 22 % (ref 12–49)
MCH RBC QN AUTO: 27 PG (ref 26–34)
MCHC RBC AUTO-ENTMCNC: 30.6 G/DL (ref 30–36.5)
MCV RBC AUTO: 88.2 FL (ref 80–99)
MICROALBUMIN UR-MCNC: 5.52 MG/DL
MICROALBUMIN/CREAT UR-RTO: 26 MG/G (ref 0–30)
MONOCYTES # BLD: 0.4 K/UL (ref 0–1)
MONOCYTES NFR BLD: 5 % (ref 5–13)
MUCOUS THREADS URNS QL MICRO: ABNORMAL /LPF
NEUTS SEG # BLD: 4.8 K/UL (ref 1.8–8)
NEUTS SEG NFR BLD: 70 % (ref 32–75)
NITRITE UR QL STRIP.AUTO: NEGATIVE
NRBC # BLD: 0 K/UL (ref 0–0.01)
NRBC BLD-RTO: 0 PER 100 WBC
PH UR STRIP: 6 (ref 5–8)
PLATELET # BLD AUTO: 278 K/UL (ref 150–400)
PMV BLD AUTO: 10.6 FL (ref 8.9–12.9)
POTASSIUM SERPL-SCNC: 4.4 MMOL/L (ref 3.5–5.1)
PROT SERPL-MCNC: 7.5 G/DL (ref 6.4–8.2)
PROT UR STRIP-MCNC: ABNORMAL MG/DL
RBC # BLD AUTO: 4.59 M/UL (ref 3.8–5.2)
RBC #/AREA URNS HPF: ABNORMAL /HPF (ref 0–5)
SODIUM SERPL-SCNC: 138 MMOL/L (ref 136–145)
SP GR UR REFRACTOMETRY: 1.02 (ref 1–1.03)
TRIGL SERPL-MCNC: 123 MG/DL (ref ?–150)
UA: UC IF INDICATED,UAUC: ABNORMAL
UROBILINOGEN UR QL STRIP.AUTO: 0.2 EU/DL (ref 0.2–1)
VLDLC SERPL CALC-MCNC: 24.6 MG/DL
WBC # BLD AUTO: 7 K/UL (ref 3.6–11)
WBC URNS QL MICRO: ABNORMAL /HPF (ref 0–4)

## 2023-02-18 DIAGNOSIS — M79.89 LEG SWELLING: ICD-10-CM

## 2023-02-20 RX ORDER — FUROSEMIDE 20 MG/1
TABLET ORAL
Qty: 30 TABLET | Refills: 1 | Status: SHIPPED | OUTPATIENT
Start: 2023-02-20

## 2023-02-21 ENCOUNTER — ANESTHESIA (OUTPATIENT)
Dept: ENDOSCOPY | Age: 63
End: 2023-02-21
Payer: MEDICAID

## 2023-02-21 ENCOUNTER — HOSPITAL ENCOUNTER (OUTPATIENT)
Age: 63
Setting detail: OUTPATIENT SURGERY
Discharge: HOME | End: 2023-02-21
Attending: INTERNAL MEDICINE | Admitting: INTERNAL MEDICINE
Payer: MEDICAID

## 2023-02-21 ENCOUNTER — ANESTHESIA EVENT (OUTPATIENT)
Dept: ENDOSCOPY | Age: 63
End: 2023-02-21
Payer: MEDICAID

## 2023-02-21 VITALS
HEIGHT: 66 IN | HEART RATE: 78 BPM | SYSTOLIC BLOOD PRESSURE: 148 MMHG | WEIGHT: 234 LBS | RESPIRATION RATE: 10 BRPM | DIASTOLIC BLOOD PRESSURE: 89 MMHG | OXYGEN SATURATION: 99 % | TEMPERATURE: 97.8 F | BODY MASS INDEX: 37.61 KG/M2

## 2023-02-21 PROCEDURE — 77030021593 HC FCPS BIOP ENDOSC BSC -A: Performed by: INTERNAL MEDICINE

## 2023-02-21 PROCEDURE — 76060000031 HC ANESTHESIA FIRST 0.5 HR: Performed by: INTERNAL MEDICINE

## 2023-02-21 PROCEDURE — 76040000019: Performed by: INTERNAL MEDICINE

## 2023-02-21 PROCEDURE — 88305 TISSUE EXAM BY PATHOLOGIST: CPT

## 2023-02-21 PROCEDURE — 74011250636 HC RX REV CODE- 250/636: Performed by: NURSE ANESTHETIST, CERTIFIED REGISTERED

## 2023-02-21 PROCEDURE — 77030020268 HC MISC GENERAL SUPPLY: Performed by: INTERNAL MEDICINE

## 2023-02-21 PROCEDURE — 2709999900 HC NON-CHARGEABLE SUPPLY: Performed by: INTERNAL MEDICINE

## 2023-02-21 PROCEDURE — 74011000250 HC RX REV CODE- 250: Performed by: NURSE ANESTHETIST, CERTIFIED REGISTERED

## 2023-02-21 RX ORDER — SODIUM CHLORIDE 0.9 % (FLUSH) 0.9 %
5-40 SYRINGE (ML) INJECTION EVERY 8 HOURS
Status: DISCONTINUED | OUTPATIENT
Start: 2023-02-21 | End: 2023-02-21 | Stop reason: HOSPADM

## 2023-02-21 RX ORDER — SODIUM CHLORIDE 0.9 % (FLUSH) 0.9 %
5-40 SYRINGE (ML) INJECTION AS NEEDED
Status: DISCONTINUED | OUTPATIENT
Start: 2023-02-21 | End: 2023-02-21 | Stop reason: HOSPADM

## 2023-02-21 RX ORDER — PROPOFOL 10 MG/ML
INJECTION, EMULSION INTRAVENOUS AS NEEDED
Status: DISCONTINUED | OUTPATIENT
Start: 2023-02-21 | End: 2023-02-21 | Stop reason: HOSPADM

## 2023-02-21 RX ORDER — FLUMAZENIL 0.1 MG/ML
0.2 INJECTION INTRAVENOUS
Status: DISCONTINUED | OUTPATIENT
Start: 2023-02-21 | End: 2023-02-21 | Stop reason: HOSPADM

## 2023-02-21 RX ORDER — SODIUM CHLORIDE 9 MG/ML
INJECTION, SOLUTION INTRAVENOUS
Status: DISCONTINUED | OUTPATIENT
Start: 2023-02-21 | End: 2023-02-21 | Stop reason: HOSPADM

## 2023-02-21 RX ORDER — ATROPINE SULFATE 0.1 MG/ML
0.5 INJECTION INTRAVENOUS
Status: DISCONTINUED | OUTPATIENT
Start: 2023-02-21 | End: 2023-02-21 | Stop reason: HOSPADM

## 2023-02-21 RX ORDER — NALOXONE HYDROCHLORIDE 0.4 MG/ML
0.4 INJECTION, SOLUTION INTRAMUSCULAR; INTRAVENOUS; SUBCUTANEOUS
Status: DISCONTINUED | OUTPATIENT
Start: 2023-02-21 | End: 2023-02-21 | Stop reason: HOSPADM

## 2023-02-21 RX ORDER — DEXTROMETHORPHAN/PSEUDOEPHED 2.5-7.5/.8
1.2 DROPS ORAL
Status: DISCONTINUED | OUTPATIENT
Start: 2023-02-21 | End: 2023-02-21 | Stop reason: HOSPADM

## 2023-02-21 RX ORDER — LIDOCAINE HYDROCHLORIDE 20 MG/ML
INJECTION, SOLUTION EPIDURAL; INFILTRATION; INTRACAUDAL; PERINEURAL AS NEEDED
Status: DISCONTINUED | OUTPATIENT
Start: 2023-02-21 | End: 2023-02-21 | Stop reason: HOSPADM

## 2023-02-21 RX ORDER — EPINEPHRINE 0.1 MG/ML
1 INJECTION INTRACARDIAC; INTRAVENOUS
Status: DISCONTINUED | OUTPATIENT
Start: 2023-02-21 | End: 2023-02-21 | Stop reason: HOSPADM

## 2023-02-21 RX ORDER — SODIUM CHLORIDE 9 MG/ML
50 INJECTION, SOLUTION INTRAVENOUS CONTINUOUS
Status: DISCONTINUED | OUTPATIENT
Start: 2023-02-21 | End: 2023-02-21 | Stop reason: HOSPADM

## 2023-02-21 RX ADMIN — PROPOFOL 50 MG: 10 INJECTION, EMULSION INTRAVENOUS at 13:37

## 2023-02-21 RX ADMIN — SODIUM CHLORIDE: 900 INJECTION, SOLUTION INTRAVENOUS at 13:25

## 2023-02-21 RX ADMIN — PROPOFOL 100 MG: 10 INJECTION, EMULSION INTRAVENOUS at 13:28

## 2023-02-21 RX ADMIN — PROPOFOL 50 MG: 10 INJECTION, EMULSION INTRAVENOUS at 13:39

## 2023-02-21 RX ADMIN — PROPOFOL 50 MG: 10 INJECTION, EMULSION INTRAVENOUS at 13:34

## 2023-02-21 RX ADMIN — PROPOFOL 50 MG: 10 INJECTION, EMULSION INTRAVENOUS at 13:31

## 2023-02-21 RX ADMIN — LIDOCAINE HYDROCHLORIDE 100 MG: 20 INJECTION, SOLUTION EPIDURAL; INFILTRATION; INTRACAUDAL; PERINEURAL at 13:28

## 2023-02-21 NOTE — H&P
Pre-Endoscopy H&P   Chief complaint: screening or surveillance of previous colon polyps    HPI:  Patient presents for procedure. The indication for the procedure, the patient's history and the patient's current medications are reviewed prior to the procedure and that data is reported on the endoscopy note in the chart to which this document is attached. Any significant complaints with regard to organ systems will be noted, and if not mentioned then a review of systems is not contributory. Past Medical History:   Diagnosis Date    Adverse effect of anesthesia     WITHIN 20-30 MINUTES AFTER WAKING UP FROM CARPAL TUNNEL SURGERY, HAD DIFFICULTY BREATHING ,     Anemia     Anxiety     Glucose intolerance (impaired glucose tolerance)     Menopause 2018      Past Surgical History:   Procedure Laterality Date    HX CARPAL TUNNEL RELEASE Bilateral     HX  SECTION      HX HYSTERECTOMY  2018    HX LAP CHOLECYSTECTOMY      HX OOPHORECTOMY Bilateral 2018    HX ORTHOPAEDIC Left 2015    SPUR REMOVED TO LEFT FOOT    HX ORTHOPAEDIC Left     SPIN AND SCREW IN LEFT ARM     Social   Social History     Tobacco Use    Smoking status: Former     Packs/day: 0.25     Years: 15.00     Pack years: 3.75     Types: Cigarettes     Quit date: 1/3/2000     Years since quittin.1    Smokeless tobacco: Never   Substance Use Topics    Alcohol use: Yes     Alcohol/week: 1.0 standard drink     Types: 1 Standard drinks or equivalent per week     Comment: OCCASSIONAL      Family History   Problem Relation Age of Onset    Stroke Mother     Diabetes Mother     Other Mother         BRAIN ANURYSM     Diabetes Father     Kidney Disease Father     Hypertension Father     Diabetes Sister     Alzheimer's Disease Maternal Grandmother     Anesth Problems Neg Hx       Allergies   Allergen Reactions    Metformin Nausea Only    Percocet [Oxycodone-Acetaminophen] Other (comments)     \"It makes me feel bad, real bad,like I have the flu. \" Prior to Admission Medications   Prescriptions Last Dose Informant Patient Reported? Taking? Gemtesa 75 mg tablet 2/20/2023  Yes Yes   Januvia 100 mg tablet 2/20/2023  No Yes   Sig: TAKE 1 TABLET BY MOUTH EVERY DAY IN THE MORNING   Nebulizer & Compressor machine   No No   Sig: Use every 6 hours as needed for shortness of breath   Patient not taking: Reported on 2/7/2023   Nebulizer Accessories kit   No No   Sig: Use face mask & tubing for nebulizer machine   Patient not taking: Reported on 2/7/2023   bisacodyL (DULCOLAX) 5 mg EC tablet 1/21/2023  Yes Yes   Sig: Take 5 mg by mouth. As needed   budesonide-formoterol (SYMBICORT) 80-4.5 mcg/actuation HFAA Unknown  No No   Sig: Take 2 Puffs by inhalation two (2) times a day. Patient taking differently: Take 2 Puffs by inhalation as needed. butalbital-acetaminophen-caffeine (FIORICET, ESGIC) -40 mg per tablet 2/14/2023  No Yes   Sig: Take 1 Tablet by mouth every six (6) hours as needed for Headache. cholecalciferol (VITAMIN D3) (1000 Units /25 mcg) tablet 2/20/2023  Yes Yes   Sig: Take  by mouth daily. ciclopirox (PENLAC) 8 % solution Not Taking  Yes No   Sig: APPLY TOPICALLY ONCE DAILY   Patient not taking: Reported on 2/21/2023   diclofenac EC (VOLTAREN) 50 mg EC tablet Not Taking  No No   Sig: Take 1 Tab by mouth two (2) times a day. Patient not taking: Reported on 2/21/2023   dicyclomine (BENTYL) 20 mg tablet Not Taking  Yes No   Sig: TAKE ONE TABLET BY MOUTH NO SOONER THAN EVERY SIX HOURS AS NEEDED FOR ABDOMINAL CRAMPING   Patient not taking: Reported on 2/21/2023   docusate sodium (COLACE) 100 mg capsule 2/21/2023  Yes Yes   Sig: Take 100 mg by mouth as needed. ezetimibe (ZETIA) 10 mg tablet 2/20/2023  No Yes   Sig: Take 1 Tablet by mouth daily. ferrous sulfate 325 mg (65 mg iron) tablet 2/20/2023  Yes Yes   Sig: Take  by mouth Daily (before breakfast). fexofenadine (ALLEGRA) 180 mg tablet Unknown  No No   Sig: Take 1 Tablet by mouth daily. furosemide (LASIX) 20 mg tablet Unknown  No No   Sig: TAKE 1 TABLET BY MOUTH EVERY DAY AS NEEDED FOR LEG SWELLING   Patient not taking: Reported on 2/21/2023   gabapentin (NEURONTIN) 100 mg capsule 1/21/2023  Yes Yes   Sig: Take 100 mg by mouth two (2) times a day. glipiZIDE (GLUCOTROL) 10 mg tablet 2/20/2023  No Yes   Sig: Take 1 Tablet by mouth two (2) times a day. latanoprost (XALATAN) 0.005 % ophthalmic solution 2/20/2023  Yes Yes   Sig: Administer 1 Drop to both eyes nightly. meloxicam (MOBIC) 7.5 mg tablet 2/14/2023  Yes Yes   Sig: Take 7.5 mg by mouth daily. metoclopramide HCl (REGLAN) 5 mg tablet 1/21/2023  Yes Yes   Sig: TAKE ONE TABLET BY MOUTH 30 MINUTES BEFORE A MEAL NO MORE THAN THREE TIMES A DAY FOR GASTROPARESIS   montelukast (SINGULAIR) 10 mg tablet Not Taking  No No   Sig: Take 1 Tab by mouth daily. Patient not taking: No sig reported   multivitamin (DAILY VITAMINS PO) 2/20/2023  Yes Yes   Sig: Take  by mouth. omega 3-DHA-EPA-fish oil (FISH OIL) 1,000 mg (120 mg-180 mg) capsule 2/20/2023  No Yes   Sig: Take 1 Cap by mouth daily. Indications: high amount of triglyceride in the blood   omeprazole (PRILOSEC) 20 mg capsule 2/20/2023  No Yes   Sig: TAKE 1 CAPSULE BY MOUTH EVERY DAY   solifenacin (VESICARE) 5 mg tablet Unknown  No No   Sig: Take 1 Tablet by mouth daily. tiZANidine (ZANAFLEX) 2 mg tablet 2/14/2023  Yes Yes   Sig: Take 2 mg by mouth every eight (8) hours as needed. Facility-Administered Medications: None       PHYSICAL EXAM:  The patient is examined immediately prior to the procedure. Visit Vitals  /75   Pulse 76   Temp 97.8 °F (36.6 °C)   Resp 14   Ht 5' 6\" (1.676 m)   Wt 106.1 kg (234 lb)   SpO2 99%   Breastfeeding No   BMI 37.77 kg/m²     Gen: Appears comfortable, no distress. Pulm: comfortable respirations with no abnormal audible breath sounds  CV: heart regular, well perfused  GI: abdomen flat. ASSESSMENT:  Patient here for procedure.  The indication for the procedure, the patient's history and the patient's current medications are reviewed prior to the procedure and that data is reported on the endoscopy note in the chart to which this document is attached. PLAN:  Informed consent discussion held, patient afforded an opportunity to ask questions and all questions answered. After being advised of the risks, benefits, and alternatives, the patient requested that we proceed and indicated so on a written consent form. Will proceed with procedure as planned.   Alan Kidd MD

## 2023-02-21 NOTE — PROCEDURES
[address] [facility].  [address] [facility] [address] Kayden Mercado, [address]  [phone]                            NAME:  Phil Celaya   :   1960   MRN:   [id number]     Date/Time:  2023 1:41 PM    Esophagogastroduodenoscopy (EGD) Procedure Note    :  Joanne Bauman MD    Staff: Endoscopy Technician-1: Sintia Kilgore  Endoscopy RN-1: Phoebe Angel RN    Referring Provider:  Everardo Petersen MD    Anethesia/Sedation:  MAC    Procedure Details   After infomed consent was obtained for the procedure, with all risks and benefits of procedure explained the patient was taken to the endoscopy suite and placed in the left lateral decubitus position. Following sequential administration of sedation as per above, the gastroscope was inserted into the mouth and advanced under direct vision to second portion of the duodenum. A careful inspection was made as the gastroscope was withdrawn, including a retroflexed view of the proximal stomach; findings and interventions are described below. Findings:  Esophagus: normal; no stricture or obstructing ring, no hiatal hernia, GEFV class I, GEJ at 39 cm from incisors; empiric dilation performed with unguided bougie 47 Fr, post-dilation inspection unremarkable, biopsies obtained from distal and mid esophageal mucosa to evaluate for EOE  Stomach:normal  Duodenum:normal           Specimens Removed:    ID Type Source Tests Collected by Time Destination   1 : random distal esophagus Preservative Esophagus, Distal  Miguel Garcia MD 2023 [facility]   2 : random mid esophagus Preservative Esophagus, Mid  Miguel Garcia MD 2023 [facility]       Complications: None. EBL:  none    Impression:    See Postoperative diagnosis above    Recommendations:   - Await pathology evaluation of biopsy / resected tissue  - Monitor response of swallowing symptoms to dilation  - Resume normal medications.   - Resume previous diet.    Discharge disposition:  Home in the company of  when able to ambulate    Gloria Mckeon MD

## 2023-02-21 NOTE — PROGRESS NOTES

## 2023-02-21 NOTE — DISCHARGE INSTRUCTIONS
[address] [address] D. Gloria Castro MD  [phone]      February 21, 2023     Kathia Hammonds  YOB: 1960    ENDOSCOPY DISCHARGE INSTRUCTIONS    If there is redness at IV site you should apply warm compress to area. If redness or soreness persist contact Dr. Gloria Castro or your primary care doctor. Gaseous discomfort may develop, but walking, belching will help relieve this. You may not operate a vehicle for 12 hours  You may not operate machinery or dangerous appliances for rest of today  You may not drink alcoholic beverages for 12 hours  Avoid making any critical decisions for 24 hours    DIET:  You may resume your normal diet, but some patients find that heavy or large meals may lead to indigestion or vomiting. I suggest a light meal as first food intake. MEDICATIONS:  The use of some over-the-counter pain medication may lead to bleeding after biopsies or other procedures you may have had done. Tylenol (also called acetaminophen) is safe to take and will not lead to bleeding. Based on the procedure you can resume baby-dose aspirin (81 mg) and may safely take anticoagulation (like apixaban (Eliquis), dabigatran (Pradaxa), edoxaban (Christean [place]), rivaroxaban (Xarelto) or warfarin (Coumadin)) or antiplatelet medications (high dose aspirin 325mg, Clopidogrel (Plavix), Prasugrel (Effient), Ticagrelor (Brilinta))for the next two (48 hours) days. ACTIVITY:  You may resume your normal household activities, but it is recommended that you spend the remainder of the day resting -  avoid any strenuous activity. CALL DR. SULLIVAN'S OFFICE IF:  Increasing pain, nausea, vomiting  Abdominal distension (swelling)  Significant new or increased bleeding (oral or rectal)  Fever/Chills  Chest pain/shortness of breath                   Additional instructions:   Normal appearing esophagus, stomach, and duodenum.     A dilation (stretching of esophagus to break up scar tissue) was performed today without complication. This often can help with swallowing symptoms, so we will contact you within 10 business days to assess your response to the dilation. Biopsies were also obtained, and we will notify you of these results within 10 business days with any further recommendations. It was an honor to be your doctor today. Please let me or my office staff know if you have any feedback about today's procedure.     Marie Yip MD, February 21, 2023

## 2023-02-21 NOTE — ANESTHESIA PREPROCEDURE EVALUATION
Relevant Problems   No relevant active problems   Anesthetic History   No history of anesthetic complications            Review of Systems / Medical History  Patient summary reviewed, nursing notes reviewed and pertinent labs reviewed    Pulmonary  Within defined limits                 Neuro/Psych   Within defined limits      Psychiatric history     Cardiovascular  Within defined limits                Exercise tolerance: >4 METS     GI/Hepatic/Renal  Within defined limits              Endo/Other  Within defined limits           Other Findings              Physical Exam    Airway  Mallampati: II  TM Distance: 4 - 6 cm  Neck ROM: normal range of motion   Mouth opening: Normal     Cardiovascular  Regular rate and rhythm,  S1 and S2 normal,  no murmur, click, rub, or gallop             Dental  No notable dental hx       Pulmonary  Breath sounds clear to auscultation               Abdominal  GI exam deferred       Other Findings            Anesthetic Plan    ASA: 2  Anesthesia type: general                    Anesthetic History   No history of anesthetic complications            Review of Systems / Medical History  Patient summary reviewed, nursing notes reviewed and pertinent labs reviewed    Pulmonary  Within defined limits                 Neuro/Psych   Within defined limits      Psychiatric history     Cardiovascular  Within defined limits                Exercise tolerance: >4 METS     GI/Hepatic/Renal  Within defined limits              Endo/Other  Within defined limits           Other Findings              Physical Exam    Airway  Mallampati: II  TM Distance: 4 - 6 cm  Neck ROM: normal range of motion   Mouth opening: Normal     Cardiovascular  Regular rate and rhythm,  S1 and S2 normal,  no murmur, click, rub, or gallop             Dental  No notable dental hx       Pulmonary  Breath sounds clear to auscultation               Abdominal  GI exam deferred       Other Findings            Anesthetic Plan    ASA: 2  Anesthesia type: general                        Anesthetic History   No history of anesthetic complications            Review of Systems / Medical History  Patient summary reviewed, nursing notes reviewed and pertinent labs reviewed    Pulmonary        Sleep apnea           Neuro/Psych   Within defined limits           Cardiovascular                  Exercise tolerance: >4 METS     GI/Hepatic/Renal                Endo/Other        Morbid obesity     Other Findings              Physical Exam    Airway  Mallampati: II  TM Distance: > 6 cm  Neck ROM: normal range of motion   Mouth opening: Normal     Cardiovascular    Rhythm: regular           Dental  No notable dental hx       Pulmonary  Breath sounds clear to auscultation               Abdominal         Other Findings            Anesthetic Plan    ASA: 3  Anesthesia type: MAC          Induction: Intravenous  Anesthetic plan and risks discussed with: Patient

## 2023-02-22 NOTE — ANESTHESIA POSTPROCEDURE EVALUATION
Post-Anesthesia Evaluation and Assessment    Patient: Salbador Jones MRN: [id number]  SSN: [id number]    YOB: 1960  Age: 66 y.o. Sex: female      I have evaluated the patient and they are stable and ready for discharge from the PACU. Cardiovascular Function/Vital Signs  Visit Vitals  BP (!) 148/89   Pulse 78   Temp 36.6 °C (97.8 °F)   Resp 10   Ht 5' 6\" (1.676 m)   Wt 106.1 kg (234 lb)   SpO2 99%   Breastfeeding No   BMI 37.77 kg/m²       Patient is status post MAC anesthesia for Procedure(s):  ESOPHAGOGASTRODUODENOSCOPY (EGD)  ESOPHAGEAL DILATION  ESOPHAGOGASTRODUODENAL (EGD) BIOPSY. Nausea/Vomiting: None    Postoperative hydration reviewed and adequate. Pain:  Pain Scale 1: Numeric (0 - 10) (02/21/23 1346)  Pain Intensity 1: 0 (02/21/23 1346)   Managed    Neurological Status: At baseline    Mental Status, Level of Consciousness: Alert and  oriented to person, place, and time    Pulmonary Status:   O2 Device: None (Room air) (02/21/23 1346)   Adequate oxygenation and airway patent    Complications related to anesthesia: None    Post-anesthesia assessment completed. No concerns    Signed By: Dulce Jalloh MD     February 22, 2023              Procedure(s):  ESOPHAGOGASTRODUODENOSCOPY (EGD)  ESOPHAGEAL DILATION  ESOPHAGOGASTRODUODENAL (EGD) BIOPSY. MAC    <BSHSIANPOST>    INITIAL Post-op Vital signs:   Vitals Value Taken Time   /89 02/21/23 1401   Temp 36.6 °C (97.8 °F) 02/21/23 1346   Pulse 91 02/21/23 1403   Resp 25 02/21/23 1403   SpO2 89 % 02/21/23 1402   Vitals shown include unvalidated device data.

## 2023-02-28 ENCOUNTER — OFFICE VISIT (OUTPATIENT)
Dept: FAMILY MEDICINE CLINIC | Age: 63
End: 2023-02-28
Payer: MEDICAID

## 2023-02-28 VITALS
WEIGHT: 229.6 LBS | HEART RATE: 77 BPM | DIASTOLIC BLOOD PRESSURE: 84 MMHG | RESPIRATION RATE: 16 BRPM | SYSTOLIC BLOOD PRESSURE: 142 MMHG | OXYGEN SATURATION: 99 % | HEIGHT: 66 IN | BODY MASS INDEX: 36.9 KG/M2 | TEMPERATURE: 97.5 F

## 2023-02-28 DIAGNOSIS — I10 ESSENTIAL HYPERTENSION: ICD-10-CM

## 2023-02-28 DIAGNOSIS — E78.2 MIXED HYPERLIPIDEMIA: ICD-10-CM

## 2023-02-28 DIAGNOSIS — E11.9 CONTROLLED TYPE 2 DIABETES MELLITUS WITHOUT COMPLICATION, WITHOUT LONG-TERM CURRENT USE OF INSULIN (HCC): Primary | ICD-10-CM

## 2023-02-28 PROCEDURE — 3052F HG A1C>EQUAL 8.0%<EQUAL 9.0%: CPT | Performed by: FAMILY MEDICINE

## 2023-02-28 PROCEDURE — 3079F DIAST BP 80-89 MM HG: CPT | Performed by: FAMILY MEDICINE

## 2023-02-28 PROCEDURE — 99214 OFFICE O/P EST MOD 30 MIN: CPT | Performed by: FAMILY MEDICINE

## 2023-02-28 PROCEDURE — 3077F SYST BP >= 140 MM HG: CPT | Performed by: FAMILY MEDICINE

## 2023-02-28 RX ORDER — LOSARTAN POTASSIUM 50 MG/1
50 TABLET ORAL DAILY
Qty: 90 TABLET | Refills: 0 | Status: SHIPPED | OUTPATIENT
Start: 2023-02-28

## 2023-02-28 RX ORDER — EZETIMIBE 10 MG/1
10 TABLET ORAL DAILY
Qty: 90 TABLET | Refills: 1 | Status: SHIPPED | OUTPATIENT
Start: 2023-02-28

## 2023-02-28 RX ORDER — ROSUVASTATIN CALCIUM 5 MG/1
5 TABLET, COATED ORAL
Qty: 90 TABLET | Refills: 1 | Status: SHIPPED | OUTPATIENT
Start: 2023-02-28

## 2023-02-28 RX ORDER — DULAGLUTIDE 0.75 MG/.5ML
0.75 INJECTION, SOLUTION SUBCUTANEOUS
Qty: 12 PEN | Refills: 5 | Status: SHIPPED | OUTPATIENT
Start: 2023-03-05

## 2023-02-28 NOTE — PROGRESS NOTES
1. Have you been to the ER, urgent care clinic since your last visit? Hospitalized since your last visit? No    2. Have you seen or consulted any other health care providers outside of the [address] [place] [address] since your last visit? Include any pap smears or colon screening.  No        Chief Complaint   Patient presents with    Follow-up    Diabetes

## 2023-02-28 NOTE — PROGRESS NOTES
2023   Paolo Soliman (: 1960) is a 66 y.o. female, established patient, here for evaluation of the following chief complaint(s):  Follow-up and Diabetes     ASSESSMENT/PLAN:  Below is the assessment and plan developed based on review of pertinent history, physical exam, labs, studies, and medications. 1. Controlled type 2 diabetes mellitus without complication, without long-term current use of insulin (HCC)  -     dulaglutide (Trulicity) 5.72 DL/7.7 mL sub-q pen; 0.5 mL by SubCUTAneous route every ., Normal, Disp-12 Pen, R-5  2. Mixed hyperlipidemia  -     ezetimibe (ZETIA) 10 mg tablet; Take 1 Tablet by mouth daily. , Normal, Disp-90 Tablet, R-1  -     rosuvastatin (CRESTOR) 5 mg tablet; Take 1 Tablet by mouth nightly., Normal, Disp-90 Tablet, R-1  3. Essential hypertension  -     losartan (COZAAR) 50 mg tablet; Take 1 Tablet by mouth daily. , Normal, Disp-90 Tablet, R-0    Return in about 3 months (around 2023) for follow up, labs, DIABETES. SUBJECTIVE/OBJECTIVE:  HPI     1. Controlled type 2 diabetes mellitus without complication, without long-term current use of insulin ([address]  Start Trulicity. Recheck labs in 3 months. Diabetes  Patient presents today for diabetes follow up. Pt. current diabetic medications include Januvia and glipizide. Taking medication as prescribed. Current monitoring regimen: home blood tests - daily. Home blood sugar records: fasting range: 120s. Any episodes of hypoglycemia? no. Known diabetic complications: none. Cardiovascular risk factors: dyslipidemia, diabetes mellitus. Diabetic Foot and Eye Exam HM Status   Topic Date Due    Diabetic Foot Care  Never done     There is no immunization history for the selected administration types on file for this patient.   Lab Results   Component Value Date/Time    Microalbumin/Creat ratio (mg/g creat) 26 02/15/2023 09:58 AM    Microalbumin,urine random 5.52 02/15/2023 09:58 AM     Lab Results   Component Value Date/Time    Hemoglobin A1c 8.5 (H) 02/15/2023 09:58 AM    Hemoglobin A1c (POC) 5.9 (A) 10/10/2018 04:04 PM    Hemoglobin A1c, External 7.6 07/21/2022 12:00 AM          2. Mixed hyperlipidemia  Currently taking Zetia. I will add Crestor low-dose    Patient presents today for hyperlipidemia. Patient currently taking Zetia and fish oil. Taking medication as prescribed without side effects. Trying to follow a low cholesterol diet. Exercising mild  Skips doses of meds: None      Lab Results   Component Value Date/Time    Cholesterol, total 209 (H) 02/15/2023 09:58 AM    Cholesterol (POC) 173 05/31/2017 02:21 PM    HDL Cholesterol 63 02/15/2023 09:58 AM    HDL Cholesterol (POC) 66 05/31/2017 02:21 PM    LDL Cholesterol (POC) 85 05/31/2017 02:21 PM    LDL, calculated 121.4 (H) 02/15/2023 09:58 AM    VLDL, calculated 24.6 02/15/2023 09:58 AM    Triglyceride 123 02/15/2023 09:58 AM    Triglycerides (POC) 113 05/31/2017 02:21 PM    CHOL/HDL Ratio 3.3 02/15/2023 09:58 AM            3. Essential hypertension  Start losartan. Ms. Carlos Eduardo Villanueva has been given the following recommendations today due to her elevated BP reading: rescreen BP within a minimum of 2 weeks, lifestyle modifications to include: dietary sodium restriction, and anti-hypertensive pharmacologic therapy ordered. The patient presents today for HTN follow-up. Taking medications daily w/o complications. Home BP readings range from did not bring log. SIde effects of meds: None  Patient trying to follow low salt diet.     Lab Results   Component Value Date/Time    Sodium 138 02/15/2023 09:58 AM    Potassium 4.4 02/15/2023 09:58 AM    Chloride 104 02/15/2023 09:58 AM    CO2 26 02/15/2023 09:58 AM    Anion gap 8 02/15/2023 09:58 AM    Glucose 234 (H) 02/15/2023 09:58 AM    BUN 12 02/15/2023 09:58 AM    Creatinine 0.90 02/15/2023 09:58 AM    BUN/Creatinine ratio 13 02/15/2023 09:58 AM    GFR est AA >60 10/20/2021 09:04 AM    GFR est non-AA >60 10/20/2021 09:04 AM Calcium 9.6 02/15/2023 09:58 AM     Lab Results   Component Value Date/Time    Microalbumin/Creat ratio (mg/g creat) 26 02/15/2023 09:58 AM    Microalbumin,urine random 5.52 02/15/2023 09:58 AM        No results found for any visits on 02/28/23. Review of Systems   Constitutional: Negative. HENT: Negative. Eyes: Negative. Respiratory: Negative. Cardiovascular: Negative. Gastrointestinal: Negative. Genitourinary: Negative. Musculoskeletal: Negative. Skin: Negative. Neurological: Negative. Endo/Heme/Allergies: Negative. Psychiatric/Behavioral: Negative. Physical Exam  Vitals and nursing note reviewed. HENT:      Head: Normocephalic and atraumatic. Right Ear: External ear normal.      Left Ear: External ear normal.      Nose: Nose normal.   Eyes:      Conjunctiva/sclera: Conjunctivae normal.   Cardiovascular:      Rate and Rhythm: Normal rate and regular rhythm. Pulmonary:      Effort: Pulmonary effort is normal.      Breath sounds: Normal breath sounds. Abdominal:      General: Bowel sounds are normal. There is no distension. Palpations: Abdomen is soft. Tenderness: There is no abdominal tenderness. Musculoskeletal:         General: Normal range of motion. Cervical back: Normal range of motion and neck supple. Skin:     General: Skin is warm and dry. Neurological:      General: No focal deficit present. Mental Status: She is alert. BP (!) 142/84 (BP 1 Location: Left upper arm, BP Patient Position: Sitting, BP Cuff Size: Adult)   Pulse 77   Temp 97.5 °F (36.4 °C) (Temporal)   Resp 16   Ht 5' 6\" (1.676 m)   Wt 229 lb 9.6 oz (104.1 kg)   SpO2 99%   BMI 37.06 kg/m²     Allergies   Allergen Reactions    Metformin Nausea Only    Percocet [Oxycodone-Acetaminophen] Other (comments)     \"It makes me feel bad, real bad,like I have the flu. \"       Current Outpatient Medications   Medication Sig    [START ON 3/5/2023] dulaglutide (Trulicity) 3.58 NN/2.7 mL sub-q pen 0.5 mL by SubCUTAneous route every Sunday. losartan (COZAAR) 50 mg tablet Take 1 Tablet by mouth daily. ezetimibe (ZETIA) 10 mg tablet Take 1 Tablet by mouth daily. rosuvastatin (CRESTOR) 5 mg tablet Take 1 Tablet by mouth nightly. furosemide (LASIX) 20 mg tablet TAKE 1 TABLET BY MOUTH EVERY DAY AS NEEDED FOR LEG SWELLING    metoclopramide HCl (REGLAN) 5 mg tablet TAKE ONE TABLET BY MOUTH 30 MINUTES BEFORE A MEAL NO MORE THAN THREE TIMES A DAY FOR GASTROPARESIS    tiZANidine (ZANAFLEX) 2 mg tablet Take 2 mg by mouth every eight (8) hours as needed. Gemtesa 75 mg tablet     Januvia 100 mg tablet TAKE 1 TABLET BY MOUTH EVERY DAY IN THE MORNING    omeprazole (PRILOSEC) 20 mg capsule TAKE 1 CAPSULE BY MOUTH EVERY DAY    butalbital-acetaminophen-caffeine (FIORICET, ESGIC) -40 mg per tablet Take 1 Tablet by mouth every six (6) hours as needed for Headache.    solifenacin (VESICARE) 5 mg tablet Take 1 Tablet by mouth daily. fexofenadine (ALLEGRA) 180 mg tablet Take 1 Tablet by mouth daily. bisacodyL (DULCOLAX) 5 mg EC tablet Take 5 mg by mouth. As needed    Nebulizer Accessories kit Use face mask & tubing for nebulizer machine    ciclopirox (PENLAC) 8 % solution     ferrous sulfate 325 mg (65 mg iron) tablet Take  by mouth Daily (before breakfast). cholecalciferol (VITAMIN D3) (1000 Units /25 mcg) tablet Take  by mouth daily. Nebulizer & Compressor machine Use every 6 hours as needed for shortness of breath    multivitamin (DAILY VITAMINS PO) Take  by mouth. omega 3-DHA-EPA-fish oil (FISH OIL) 1,000 mg (120 mg-180 mg) capsule Take 1 Cap by mouth daily. Indications: high amount of triglyceride in the blood    budesonide-formoterol (SYMBICORT) 80-4.5 mcg/actuation HFAA Take 2 Puffs by inhalation two (2) times a day.  (Patient taking differently: Take 2 Puffs by inhalation as needed.)    latanoprost (XALATAN) 0.005 % ophthalmic solution Administer 1 Drop to both eyes nightly. dicyclomine (BENTYL) 20 mg tablet TAKE ONE TABLET BY MOUTH NO SOONER THAN EVERY SIX HOURS AS NEEDED FOR ABDOMINAL CRAMPING (Patient not taking: No sig reported)    gabapentin (NEURONTIN) 100 mg capsule Take 100 mg by mouth two (2) times a day. (Patient not taking: Reported on 2023)    meloxicam (MOBIC) 7.5 mg tablet Take 7.5 mg by mouth daily. (Patient not taking: Reported on 2023)    glipiZIDE (GLUCOTROL) 10 mg tablet Take 1 Tablet by mouth two (2) times a day. diclofenac EC (VOLTAREN) 50 mg EC tablet Take 1 Tab by mouth two (2) times a day. (Patient not taking: No sig reported)    docusate sodium (COLACE) 100 mg capsule Take 100 mg by mouth as needed. montelukast (SINGULAIR) 10 mg tablet Take 1 Tab by mouth daily. (Patient not taking: No sig reported)     No current facility-administered medications for this visit. Past Medical History:   Diagnosis Date    Adverse effect of anesthesia     WITHIN 20-30 MINUTES AFTER WAKING UP FROM CARPAL TUNNEL SURGERY, HAD DIFFICULTY BREATHING ,     Anemia     Anxiety     Glucose intolerance (impaired glucose tolerance)     Menopause 2018       Past Surgical History:   Procedure Laterality Date    HX CARPAL TUNNEL RELEASE Bilateral     HX  SECTION      HX HYSTERECTOMY  2018    HX LAP CHOLECYSTECTOMY      HX OOPHORECTOMY Bilateral 2018    HX ORTHOPAEDIC Left 2015    SPUR REMOVED TO LEFT FOOT    HX ORTHOPAEDIC Left     SPIN AND SCREW IN LEFT ARM       Social History:  reports that she quit smoking about 23 years ago. She has a 3.75 pack-year smoking history. She has never used smokeless tobacco. She reports current alcohol use of about 1.0 standard drink per week. She reports that she does not use drugs.     Patient Care Team:  Doris Ventura MD as PCP - General (Family Medicine)  Doris Ventura MD as PCP - [facility] Empaneled Provider  Britt Cunningham, [address] (Registered Dietitian or Nutritional Professional)  Cele Benoit Marco Antonio Reyes MD (Gastroenterology)  Jannet Wolfe as Physician (Gastroenterology)  Chelo Casanova MD (Gastroenterology)    Problem List  Date Reviewed: 2/28/2023            Codes Class Noted    Gastroparesis ICD-10-CM: K31.84  ICD-9-CM: 536.3  2/7/2023        Overactive bladder ICD-10-CM: N32.81  ICD-9-CM: 596.51  6/30/2022        Mixed stress and urge urinary incontinence ICD-10-CM: N39.46  ICD-9-CM: 788.33  6/30/2022        Stridor ICD-10-CM: R06.1  ICD-9-CM: 786.1  11/28/2018        Severe obesity ([address] ICD-10-CM: E66.01  ICD-9-CM: 278.01  10/10/2018        SOB (shortness of breath) ICD-10-CM: R06.02  ICD-9-CM: 786.05  10/10/2018        Pain of right hip joint ICD-10-CM: M25.551  ICD-9-CM: 719.45  10/10/2018        Obstructive sleep apnea syndrome ICD-10-CM: G47.33  ICD-9-CM: 327.23  2/2/2018        Snoring ICD-10-CM: R06.83  ICD-9-CM: 786.09  11/29/2017        Glucose intolerance (impaired glucose tolerance) ICD-10-CM: R73.02  ICD-9-CM: 790.22  Unknown        Anemia ICD-10-CM: D64.9  ICD-9-CM: 819. 9  Unknown                I ADVISED PATIENT TO GO TO ER IF SYMPTOMS WORSEN , CHANGE OR FAILS TO IMPROVE. I have discussed the diagnosis with the patient and the intended plan as seen in the above orders. The patient has received an after-visit summary and questions were answered concerning future plans. I have discussed medication side effects and warnings with the patient as well. The patient agrees and understands above plan. An electronic signature was used to authenticate this note.   -- Lou Ruby MD

## 2023-03-14 DIAGNOSIS — M79.89 LEG SWELLING: ICD-10-CM

## 2023-03-20 RX ORDER — FUROSEMIDE 20 MG/1
TABLET ORAL
Qty: 30 TABLET | Refills: 1 | Status: SHIPPED | OUTPATIENT
Start: 2023-03-20

## 2023-03-30 ENCOUNTER — TELEPHONE (OUTPATIENT)
Dept: FAMILY MEDICINE CLINIC | Age: 63
End: 2023-03-30

## 2023-03-30 ENCOUNTER — VIRTUAL VISIT (OUTPATIENT)
Dept: FAMILY MEDICINE CLINIC | Age: 63
End: 2023-03-30
Payer: MEDICAID

## 2023-03-30 DIAGNOSIS — M54.2 NECK PAIN: ICD-10-CM

## 2023-03-30 DIAGNOSIS — M54.50 LUMBAR BACK PAIN: ICD-10-CM

## 2023-03-30 DIAGNOSIS — N28.1 BENIGN CYST OF KIDNEY: ICD-10-CM

## 2023-03-30 DIAGNOSIS — M54.41 RIGHT-SIDED LOW BACK PAIN WITH RIGHT-SIDED SCIATICA, UNSPECIFIED CHRONICITY: ICD-10-CM
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
## 2023-03-30 PROCEDURE — 99214 OFFICE O/P EST MOD 30 MIN: CPT | Performed by: FAMILY MEDICINE

## 2023-03-30 RX ORDER — ETODOLAC 400 MG/1
400 TABLET, FILM COATED ORAL
Qty: 60 TABLET | Refills: 0 | Status: SHIPPED | OUTPATIENT
Start: 2023-03-30

## 2023-03-30 RX ORDER — TIZANIDINE 2 MG/1
2 TABLET ORAL
Qty: 30 TABLET | Refills: 0 | Status: SHIPPED | OUTPATIENT
Start: 2023-03-30
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
## 2023-04-02 DIAGNOSIS — I10 ESSENTIAL HYPERTENSION: ICD-10-CM

## 2023-04-03 RX ORDER — LOSARTAN POTASSIUM 50 MG/1
TABLET ORAL
Qty: 90 TABLET | Refills: 0 | Status: SHIPPED
Start: 2023-04-03

## 2023-04-05 ENCOUNTER — VIRTUAL VISIT (OUTPATIENT)
Dept: FAMILY MEDICINE CLINIC | Age: 63
End: 2023-04-05
Payer: MEDICAID

## 2023-04-05 PROCEDURE — 99213 OFFICE O/P EST LOW 20 MIN: CPT | Performed by: FAMILY MEDICINE

## 2023-04-05 RX ORDER — KETOROLAC TROMETHAMINE 10 MG/1
10 TABLET, FILM COATED ORAL
Qty: 20 TABLET | Refills: 0 | Status: SHIPPED
Start: 2023-04-05 | End: 2023-04-10
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
## 2023-04-27 ENCOUNTER — TELEPHONE (OUTPATIENT)
Dept: FAMILY MEDICINE CLINIC | Age: 63
End: 2023-04-27

## 2023-04-27 DIAGNOSIS — E11.9 CONTROLLED TYPE 2 DIABETES MELLITUS WITHOUT COMPLICATION, WITHOUT LONG-TERM CURRENT USE OF INSULIN (HCC): Primary | ICD-10-CM

## 2023-04-27 RX ORDER — IBUPROFEN 200 MG
CAPSULE ORAL
Qty: 100 STRIP | Refills: 3 | Status: SHIPPED | OUTPATIENT
Start: 2023-04-27

## 2023-04-27 NOTE — TELEPHONE ENCOUNTER
Pt is asking for strips for the [employer] brand monitor. Not on current list, pt states that she only have 3 strips left. She test before and after meals.       Please advise    PCP: Anusha Renee MD    Last appt: 4/5/2023  Future Appointments   Date Time Provider Kassy Mcdaniel   5/1/2023 10:20 AM Anusha Renee MD [facility]       Requested Prescriptions      No prescriptions requested or ordered in this encounter       Prior labs and Blood pressures:  BP Readings from Last 3 Encounters:   02/28/23 (!) 142/84   02/21/23 (!) 148/89   02/07/23 133/81     Lab Results   Component Value Date/Time    Sodium 138 02/15/2023 09:58 AM    Potassium 4.4 02/15/2023 09:58 AM    Chloride 104 02/15/2023 09:58 AM    CO2 26 02/15/2023 09:58 AM    Anion gap 8 02/15/2023 09:58 AM    Glucose 234 (H) 02/15/2023 09:58 AM    BUN 12 02/15/2023 09:58 AM    Creatinine 0.90 02/15/2023 09:58 AM    BUN/Creatinine ratio 13 02/15/2023 09:58 AM    GFR est AA >60 10/20/2021 09:04 AM    GFR est non-AA >60 10/20/2021 09:04 AM    Calcium 9.6 02/15/2023 09:58 AM     Lab Results   Component Value Date/Time    Hemoglobin A1c 8.5 (H) 02/15/2023 09:58 AM    Hemoglobin A1c (POC) 5.9 (A) 10/10/2018 04:04 PM    Hemoglobin A1c, External 7.6 07/21/2022 12:00 AM     Lab Results   Component Value Date/Time    Cholesterol, total 209 (H) 02/15/2023 09:58 AM    Cholesterol (POC) 173 05/31/2017 02:21 PM    HDL Cholesterol 63 02/15/2023 09:58 AM    HDL Cholesterol (POC) 66 05/31/2017 02:21 PM    LDL Cholesterol (POC) 85 05/31/2017 02:21 PM    LDL, calculated 121.4 (H) 02/15/2023 09:58 AM    VLDL, calculated 24.6 02/15/2023 09:58 AM    Triglyceride 123 02/15/2023 09:58 AM    Triglycerides (POC) 113 05/31/2017 02:21 PM    CHOL/HDL Ratio 3.3 02/15/2023 09:58 AM     Lab Results   Component Value Date/Time    Vitamin D 25-Hydroxy 47.5 02/15/2023 09:58 AM       Lab Results   Component Value Date/Time    TSH 1.300 05/20/2021 09:19 AM    TSH 1.00 12/19/2019 11:15 AM

## 2023-05-01 ENCOUNTER — OFFICE VISIT (OUTPATIENT)
Dept: FAMILY MEDICINE CLINIC | Age: 63
End: 2023-05-01
Payer: MEDICAID

## 2023-05-01 VITALS
SYSTOLIC BLOOD PRESSURE: 145 MMHG | TEMPERATURE: 98.3 F | HEIGHT: 66 IN | DIASTOLIC BLOOD PRESSURE: 82 MMHG | BODY MASS INDEX: 36.58 KG/M2 | WEIGHT: 227.6 LBS | RESPIRATION RATE: 17 BRPM | HEART RATE: 79 BPM | OXYGEN SATURATION: 98 %

## 2023-05-01 DIAGNOSIS — G44.329 CHRONIC POST-CONCUSSION HEADACHE: ICD-10-CM

## 2023-05-01 DIAGNOSIS — M25.511 CHRONIC PAIN OF BOTH SHOULDERS: ICD-10-CM

## 2023-05-01 DIAGNOSIS — G89.29 CHRONIC PAIN OF BOTH SHOULDERS: ICD-10-CM

## 2023-05-01 DIAGNOSIS — S06.0XAS CONCUSSION WITH UNKNOWN LOSS OF CONSCIOUSNESS STATUS, SEQUELA (HCC): ICD-10-CM

## 2023-05-01 DIAGNOSIS — G89.29 CHRONIC PAIN OF BOTH KNEES: ICD-10-CM

## 2023-05-01 DIAGNOSIS — M54.50 LUMBAR BACK PAIN: ICD-10-CM

## 2023-05-01 DIAGNOSIS — M54.41 RIGHT-SIDED LOW BACK PAIN WITH RIGHT-SIDED SCIATICA, UNSPECIFIED CHRONICITY: ICD-10-CM

## 2023-05-01 DIAGNOSIS — M54.2 NECK PAIN: ICD-10-CM

## 2023-05-01 DIAGNOSIS — M25.552 BILATERAL HIP PAIN: Primary | ICD-10-CM
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
## 2023-05-01 DIAGNOSIS — M25.512 CHRONIC PAIN OF BOTH SHOULDERS: ICD-10-CM

## 2023-05-01 DIAGNOSIS — H53.9 VISION CHANGES: ICD-10-CM

## 2023-05-01 DIAGNOSIS — E11.9 CONTROLLED TYPE 2 DIABETES MELLITUS WITHOUT COMPLICATION, WITHOUT LONG-TERM CURRENT USE OF INSULIN (HCC): ICD-10-CM

## 2023-05-01 DIAGNOSIS — M25.561 CHRONIC PAIN OF BOTH KNEES: ICD-10-CM

## 2023-05-01 DIAGNOSIS — M25.551 BILATERAL HIP PAIN: Primary | ICD-10-CM

## 2023-05-01 DIAGNOSIS — E78.2 MIXED HYPERLIPIDEMIA: ICD-10-CM

## 2023-05-01 DIAGNOSIS — I10 ESSENTIAL HYPERTENSION: ICD-10-CM

## 2023-05-01 DIAGNOSIS — M25.562 CHRONIC PAIN OF BOTH KNEES: ICD-10-CM

## 2023-05-01 PROCEDURE — 3077F SYST BP >= 140 MM HG: CPT | Performed by: FAMILY MEDICINE

## 2023-05-01 PROCEDURE — 3079F DIAST BP 80-89 MM HG: CPT | Performed by: FAMILY MEDICINE

## 2023-05-01 PROCEDURE — 3052F HG A1C>EQUAL 8.0%<EQUAL 9.0%: CPT | Performed by: FAMILY MEDICINE

## 2023-05-01 PROCEDURE — 99214 OFFICE O/P EST MOD 30 MIN: CPT | Performed by: FAMILY MEDICINE

## 2023-05-01 RX ORDER — IBUPROFEN 200 MG
CAPSULE ORAL
Qty: 100 STRIP | Refills: 3 | Status: SHIPPED | OUTPATIENT
Start: 2023-05-01
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
## 2023-05-09 DIAGNOSIS — M79.89 OTHER SPECIFIED SOFT TISSUE DISORDERS: ICD-10-CM

## 2023-05-09 RX ORDER — ESCITALOPRAM OXALATE 10 MG/1
TABLET ORAL
COMMUNITY
Start: 2023-05-04

## 2023-05-09 RX ORDER — TIZANIDINE 2 MG/1
TABLET ORAL
COMMUNITY
Start: 2023-03-30

## 2023-05-09 RX ORDER — GABAPENTIN 300 MG/1
CAPSULE ORAL
COMMUNITY
Start: 2023-04-11

## 2023-05-09 RX ORDER — LATANOPROST 50 UG/ML
SOLUTION/ DROPS OPHTHALMIC
COMMUNITY
Start: 2023-04-17

## 2023-05-09 RX ORDER — DIAZEPAM 5 MG/1
TABLET ORAL
COMMUNITY
Start: 2023-05-04

## 2023-05-09 RX ORDER — PRAZOSIN HYDROCHLORIDE 1 MG/1
CAPSULE ORAL
COMMUNITY
Start: 2023-05-04

## 2023-05-09 RX ORDER — OMEPRAZOLE 20 MG/1
CAPSULE, DELAYED RELEASE ORAL DAILY
COMMUNITY
Start: 2023-04-01

## 2023-05-09 RX ORDER — DULAGLUTIDE 0.75 MG/.5ML
INJECTION, SOLUTION SUBCUTANEOUS
COMMUNITY
Start: 2023-03-01

## 2023-05-09 RX ORDER — FUROSEMIDE 20 MG/1
TABLET ORAL
COMMUNITY
Start: 2023-04-12 | End: 2023-06-29 | Stop reason: SDUPTHER

## 2023-05-09 RX ORDER — SITAGLIPTIN 100 MG/1
100 TABLET, FILM COATED ORAL EVERY MORNING
COMMUNITY
Start: 2023-04-28

## 2023-05-09 RX ORDER — EZETIMIBE 10 MG/1
10 TABLET ORAL DAILY
COMMUNITY
Start: 2023-04-26

## 2023-05-09 RX ORDER — FUROSEMIDE 20 MG/1
TABLET ORAL
Qty: 30 TABLET | Refills: 1 | Status: SHIPPED | OUTPATIENT
Start: 2023-05-09

## 2023-05-09 RX ORDER — GLIPIZIDE 10 MG/1
10 TABLET ORAL 2 TIMES DAILY
COMMUNITY
Start: 2023-04-14

## 2023-05-09 RX ORDER — VIBEGRON 75 MG/1
TABLET, FILM COATED ORAL
COMMUNITY
Start: 2023-04-11

## 2023-05-09 RX ORDER — KETOROLAC TROMETHAMINE 10 MG/1
TABLET, FILM COATED ORAL
COMMUNITY
Start: 2023-04-05

## 2023-05-09 RX ORDER — LOSARTAN POTASSIUM 50 MG/1
50 TABLET ORAL DAILY
COMMUNITY
Start: 2023-02-28 | End: 2023-06-15

## 2023-05-09 RX ORDER — ETODOLAC 400 MG/1
400 TABLET, FILM COATED ORAL 2 TIMES DAILY
COMMUNITY
Start: 2023-03-30

## 2023-05-09 RX ORDER — ROSUVASTATIN CALCIUM 5 MG/1
5 TABLET, COATED ORAL NIGHTLY
COMMUNITY
Start: 2023-02-28 | End: 2023-06-15

## 2023-05-26 RX ORDER — PSEUDOEPHEDRINE HCL 30 MG
100 TABLET ORAL PRN
COMMUNITY

## 2023-05-26 RX ORDER — METOCLOPRAMIDE 5 MG/1
TABLET ORAL
COMMUNITY
Start: 2022-09-06

## 2023-05-26 RX ORDER — BUDESONIDE AND FORMOTEROL FUMARATE DIHYDRATE 80; 4.5 UG/1; UG/1
2 AEROSOL RESPIRATORY (INHALATION) 2 TIMES DAILY
COMMUNITY
Start: 2018-11-29

## 2023-05-26 RX ORDER — BUTALBITAL, ACETAMINOPHEN AND CAFFEINE 50; 325; 40 MG/1; MG/1; MG/1
1 TABLET ORAL EVERY 6 HOURS PRN
COMMUNITY
Start: 2022-08-09

## 2023-05-26 RX ORDER — FERROUS SULFATE 325(65) MG
TABLET ORAL
COMMUNITY

## 2023-05-26 RX ORDER — BISACODYL 5 MG/1
1 TABLET, DELAYED RELEASE ORAL PRN
COMMUNITY

## 2023-05-26 RX ORDER — MONTELUKAST SODIUM 10 MG/1
10 TABLET ORAL DAILY
COMMUNITY
Start: 2018-11-29

## 2023-05-26 RX ORDER — FEXOFENADINE HCL 180 MG/1
180 TABLET ORAL DAILY
COMMUNITY
Start: 2021-10-13

## 2023-05-26 RX ORDER — DICYCLOMINE HCL 20 MG
TABLET ORAL
COMMUNITY
Start: 2022-07-21

## 2023-05-26 RX ORDER — SOLIFENACIN SUCCINATE 5 MG/1
5 TABLET, FILM COATED ORAL DAILY
COMMUNITY
Start: 2022-06-30

## 2023-06-01 DIAGNOSIS — M79.89 OTHER SPECIFIED SOFT TISSUE DISORDERS: ICD-10-CM

## 2023-06-01 RX ORDER — FUROSEMIDE 20 MG/1
TABLET ORAL
Qty: 30 TABLET | Refills: 1 | Status: SHIPPED | OUTPATIENT
Start: 2023-06-01

## 2023-06-01 NOTE — TELEPHONE ENCOUNTER
02/15/2023 11.6  11.5 - 14.5 % Final    Platelets 44/37/6278 278  150 - 400 K/uL Final    MPV 02/15/2023 10.6  8.9 - 12.9 FL Final    Nucleated RBCs 02/15/2023 0.0  0  WBC Final    NRBC Absolute 02/15/2023 0.00  0.00 - 0.01 K/uL Final    Neutrophils % 02/15/2023 70  32 - 75 % Final    Lymphocytes % 02/15/2023 22  12 - 49 % Final    Monocytes % 02/15/2023 5  5 - 13 % Final    Eosinophils % 02/15/2023 2  0 - 7 % Final    Basophils % 02/15/2023 1  0 - 1 % Final    Immature Granulocytes 02/15/2023 0  0.0 - 0.5 % Final    Neutrophils Absolute 02/15/2023 4.8  1.8 - 8.0 K/UL Final    Lymphocytes Absolute 02/15/2023 1.5  0.8 - 3.5 K/UL Final    Monocytes Absolute 02/15/2023 0.4  0.0 - 1.0 K/UL Final    Eosinophils Absolute 02/15/2023 0.2  0.0 - 0.4 K/UL Final    Basophils Absolute 02/15/2023 0.1  0.0 - 0.1 K/UL Final    Granulocyte Absolute Count 02/15/2023 0.0  0.00 - 0.04 K/UL Final    Differential Type 02/15/2023 AUTOMATED    Final    Hemoglobin A1C 02/15/2023 8.5 (H)  4.0 - 5.6 % Final    Comment: NEW METHOD  PLEASE NOTE NEW REFERENCE RANGE  (NOTE)  HbA1C Interpretive Ranges  <5.7              Normal  5.7 - 6.4         Consider Prediabetes  >6.5              Consider Diabetes      eAG 02/15/2023 197  mg/dL Final    Cholesterol, Total 02/15/2023 209 (H)  <200 MG/DL Final    Triglycerides 02/15/2023 123  <150 MG/DL Final    Based on NCEP-ATP III:  Triglycerides <150 mg/dL  is considered normal, 150-199 mg/dL  borderline high,  200-499 mg/dL high and  greater than or equal to 500 mg/dL very high. HDL 02/15/2023 63  MG/DL Final    Based on NCEP ATP III, HDL Cholesterol <40 mg/dL is considered low and >60 mg/dL is elevated.     LDL Calculated 02/15/2023 121.4 (H)  0 - 100 MG/DL Final    Comment: Based on the NCEP-ATP: LDL-C concentrations are considered  optimal <100 mg/dL,  near optimal/above Normal 100-129 mg/dL  Borderline High: 130-159, High: 160-189 mg/dL  Very High: Greater than or equal to 190 mg/dL      VLDL

## 2023-06-08 ENCOUNTER — TELEPHONE (OUTPATIENT)
Facility: CLINIC | Age: 63
End: 2023-06-08

## 2023-06-08 DIAGNOSIS — E11.9 TYPE 2 DIABETES MELLITUS WITHOUT COMPLICATION, WITHOUT LONG-TERM CURRENT USE OF INSULIN (HCC): Primary | ICD-10-CM

## 2023-06-08 RX ORDER — GLUCOSAMINE HCL/CHONDROITIN SU 500-400 MG
CAPSULE ORAL
Qty: 100 STRIP | Refills: 0 | Status: SHIPPED | OUTPATIENT
Start: 2023-06-08

## 2023-06-08 RX ORDER — LANCETS 30 GAUGE
1 EACH MISCELLANEOUS 2 TIMES DAILY
Qty: 300 EACH | Refills: 1 | Status: SHIPPED | OUTPATIENT
Start: 2023-06-08

## 2023-06-08 NOTE — TELEPHONE ENCOUNTER
Pt insurance states that insurance will cover One touch for meter, strips, and lancets.    To [id number]- [place]    Please advise

## 2023-06-29 DIAGNOSIS — M79.89 OTHER SPECIFIED SOFT TISSUE DISORDERS: ICD-10-CM

## 2023-06-29 RX ORDER — FUROSEMIDE 20 MG/1
20 TABLET ORAL DAILY
Qty: 90 TABLET | Refills: 1 | Status: SHIPPED | OUTPATIENT
Start: 2023-06-29

## 2023-08-03 ENCOUNTER — OFFICE VISIT (OUTPATIENT)
Facility: CLINIC | Age: 63
End: 2023-08-03
Payer: COMMERCIAL

## 2023-08-03 ENCOUNTER — TELEPHONE (OUTPATIENT)
Facility: CLINIC | Age: 63
End: 2023-08-03

## 2023-08-03 VITALS
HEART RATE: 75 BPM | BODY MASS INDEX: 37.77 KG/M2 | HEIGHT: 66 IN | SYSTOLIC BLOOD PRESSURE: 102 MMHG | OXYGEN SATURATION: 99 % | TEMPERATURE: 97.8 F | WEIGHT: 235 LBS | RESPIRATION RATE: 20 BRPM | DIASTOLIC BLOOD PRESSURE: 66 MMHG

## 2023-08-03 DIAGNOSIS — E78.2 MIXED HYPERLIPIDEMIA: ICD-10-CM

## 2023-08-03 DIAGNOSIS — I10 ESSENTIAL (PRIMARY) HYPERTENSION: ICD-10-CM

## 2023-08-03 DIAGNOSIS — E11.65 TYPE 2 DIABETES MELLITUS WITH HYPERGLYCEMIA, WITHOUT LONG-TERM CURRENT USE OF INSULIN (HCC): Primary | ICD-10-CM

## 2023-08-03 DIAGNOSIS — E11.65 TYPE 2 DIABETES MELLITUS WITH HYPERGLYCEMIA, WITHOUT LONG-TERM CURRENT USE OF INSULIN (HCC): ICD-10-CM

## 2023-08-03 LAB
BASOPHILS # BLD: 0.1 K/UL (ref 0–0.1)
BASOPHILS NFR BLD: 1 % (ref 0–1)
DIFFERENTIAL METHOD BLD: ABNORMAL
EOSINOPHIL # BLD: 0.2 K/UL (ref 0–0.4)
EOSINOPHIL NFR BLD: 3 % (ref 0–7)
ERYTHROCYTE [DISTWIDTH] IN BLOOD BY AUTOMATED COUNT: 11.9 % (ref 11.5–14.5)
HCT VFR BLD AUTO: 38.3 % (ref 35–47)
HGB BLD-MCNC: 11.3 G/DL (ref 11.5–16)
IMM GRANULOCYTES # BLD AUTO: 0 K/UL (ref 0–0.04)
IMM GRANULOCYTES NFR BLD AUTO: 0 % (ref 0–0.5)
LYMPHOCYTES # BLD: 2 K/UL (ref 0.8–3.5)
LYMPHOCYTES NFR BLD: 31 % (ref 12–49)
MCH RBC QN AUTO: 26.9 PG (ref 26–34)
MCHC RBC AUTO-ENTMCNC: 29.5 G/DL (ref 30–36.5)
MCV RBC AUTO: 91.2 FL (ref 80–99)
MONOCYTES # BLD: 0.5 K/UL (ref 0–1)
MONOCYTES NFR BLD: 7 % (ref 5–13)
NEUTS SEG # BLD: 3.7 K/UL (ref 1.8–8)
NEUTS SEG NFR BLD: 58 % (ref 32–75)
NRBC # BLD: 0 K/UL (ref 0–0.01)
NRBC BLD-RTO: 0 PER 100 WBC
PLATELET # BLD AUTO: 247 K/UL (ref 150–400)
PMV BLD AUTO: 9.7 FL (ref 8.9–12.9)
RBC # BLD AUTO: 4.2 M/UL (ref 3.8–5.2)
WBC # BLD AUTO: 6.4 K/UL (ref 3.6–11)

## 2023-08-03 PROCEDURE — 3074F SYST BP LT 130 MM HG: CPT | Performed by: STUDENT IN AN ORGANIZED HEALTH CARE EDUCATION/TRAINING PROGRAM

## 2023-08-03 PROCEDURE — 99214 OFFICE O/P EST MOD 30 MIN: CPT | Performed by: STUDENT IN AN ORGANIZED HEALTH CARE EDUCATION/TRAINING PROGRAM

## 2023-08-03 PROCEDURE — 3052F HG A1C>EQUAL 8.0%<EQUAL 9.0%: CPT | Performed by: STUDENT IN AN ORGANIZED HEALTH CARE EDUCATION/TRAINING PROGRAM

## 2023-08-03 PROCEDURE — 3078F DIAST BP <80 MM HG: CPT | Performed by: STUDENT IN AN ORGANIZED HEALTH CARE EDUCATION/TRAINING PROGRAM

## 2023-08-03 RX ORDER — SUMATRIPTAN 50 MG/1
50 TABLET, FILM COATED ORAL DAILY PRN
COMMUNITY
Start: 2023-07-19 | End: 2023-08-18

## 2023-08-03 RX ORDER — TRAZODONE HYDROCHLORIDE 50 MG/1
TABLET ORAL
COMMUNITY
Start: 2023-07-15

## 2023-08-03 RX ORDER — ARIPIPRAZOLE 10 MG/1
10 TABLET ORAL NIGHTLY
COMMUNITY
Start: 2023-07-17

## 2023-08-03 RX ORDER — NORTRIPTYLINE HYDROCHLORIDE 10 MG/1
CAPSULE ORAL
COMMUNITY
Start: 2023-07-19

## 2023-08-03 RX ORDER — DULAGLUTIDE 1.5 MG/.5ML
1.5 INJECTION, SOLUTION SUBCUTANEOUS WEEKLY
Qty: 4 ADJUSTABLE DOSE PRE-FILLED PEN SYRINGE | Refills: 2 | OUTPATIENT
Start: 2023-08-03

## 2023-08-03 RX ORDER — DULAGLUTIDE 1.5 MG/.5ML
1.5 INJECTION, SOLUTION SUBCUTANEOUS WEEKLY
Qty: 4 ADJUSTABLE DOSE PRE-FILLED PEN SYRINGE | Refills: 2 | Status: SHIPPED | OUTPATIENT
Start: 2023-08-03

## 2023-08-03 RX ORDER — BUPROPION HYDROCHLORIDE 150 MG/1
150 TABLET ORAL EVERY MORNING
COMMUNITY
Start: 2023-07-15

## 2023-08-03 RX ORDER — MELOXICAM 7.5 MG/1
7.5 TABLET ORAL DAILY
COMMUNITY
Start: 2022-12-21 | End: 2023-08-03

## 2023-08-03 RX ORDER — MULTIVITAMIN
TABLET ORAL
COMMUNITY

## 2023-08-03 SDOH — ECONOMIC STABILITY: FOOD INSECURITY: WITHIN THE PAST 12 MONTHS, YOU WORRIED THAT YOUR FOOD WOULD RUN OUT BEFORE YOU GOT MONEY TO BUY MORE.: PATIENT DECLINED

## 2023-08-03 SDOH — ECONOMIC STABILITY: INCOME INSECURITY: HOW HARD IS IT FOR YOU TO PAY FOR THE VERY BASICS LIKE FOOD, HOUSING, MEDICAL CARE, AND HEATING?: PATIENT DECLINED

## 2023-08-03 SDOH — ECONOMIC STABILITY: HOUSING INSECURITY
IN THE LAST 12 MONTHS, WAS THERE A TIME WHEN YOU DID NOT HAVE A STEADY PLACE TO SLEEP OR SLEPT IN A SHELTER (INCLUDING NOW)?: PATIENT REFUSED

## 2023-08-03 SDOH — ECONOMIC STABILITY: FOOD INSECURITY: WITHIN THE PAST 12 MONTHS, THE FOOD YOU BOUGHT JUST DIDN'T LAST AND YOU DIDN'T HAVE MONEY TO GET MORE.: PATIENT DECLINED

## 2023-08-03 ASSESSMENT — ENCOUNTER SYMPTOMS
VOMITING: 0
SHORTNESS OF BREATH: 0
RHINORRHEA: 0
NAUSEA: 0
ABDOMINAL PAIN: 0
COUGH: 0

## 2023-08-03 NOTE — PROGRESS NOTES
Assessment/Plan:     Diagnoses and all orders for this visit:    Type 2 diabetes mellitus with hyperglycemia, without long-term current use of insulin (HCC)  -     dulaglutide (TRULICITY) 1.5 XC/1.3ZN SC injection; Inject 0.5 mLs into the skin once a week  -     CBC with Auto Differential; Future  -     Comprehensive Metabolic Panel; Future  -     Hemoglobin A1C; Future  -Chronic. Previous A1c elevated however recently fasting glucoses have shown hypoglycemia  -Check routine lab work today including A1c  -Due to hypoglycemia decrease glipizide from 10 mg twice daily to now 5 mg twice daily  -Given patient is on Trulicity will discontinue Januvia  -Increase Trulicity from 5.09 to now 1.5 mg weekly. -Advised patient to monitor glucose closely including for further hypoglycemia  -Follow-up in 1 month for reevaluation    Mixed hyperlipidemia  -     Lipid Panel; Future  -Chronic. Presumed stable. -Repeat lipid panel.  -Continue on Zetia and Crestor daily    Essential (primary) hypertension  -     CBC with Auto Differential; Future  -     Comprehensive Metabolic Panel; Future  -Chronic. Stable. -Check routine lab work  -Continue losartan 50 mg daily         Return in about 4 weeks (around 8/31/2023), or if symptoms worsen or fail to improve. Discussed expected course/resolution/complications of diagnosis in detail with patient. Medication risks/benefits/costs/interactions/alternatives discussed with patient. Pt expressed understanding with the diagnosis and plan      Subjective:      Javier Escalante is a 66 y.o. female who presents for had concerns including 3 Month Follow-Up (Also, refill on Tammy).      Current Outpatient Medications   Medication Sig Dispense Refill    ARIPiprazole (ABILIFY) 10 MG tablet Take 1 tablet by mouth nightly      buPROPion (WELLBUTRIN XL) 150 MG extended release tablet Take 1 tablet by mouth every morning      Multiple Vitamin (MULTIVITAMIN) tablet Take by mouth

## 2023-08-04 LAB
ALBUMIN SERPL-MCNC: 3.8 G/DL (ref 3.5–5)
ALBUMIN/GLOB SERPL: 1.1 (ref 1.1–2.2)
ALP SERPL-CCNC: 141 U/L (ref 45–117)
ALT SERPL-CCNC: 32 U/L (ref 12–78)
ANION GAP SERPL CALC-SCNC: 2 MMOL/L (ref 5–15)
AST SERPL-CCNC: 20 U/L (ref 15–37)
BILIRUB SERPL-MCNC: 0.5 MG/DL (ref 0.2–1)
BUN SERPL-MCNC: 10 MG/DL (ref 6–20)
BUN/CREAT SERPL: 13 (ref 12–20)
CALCIUM SERPL-MCNC: 9.4 MG/DL (ref 8.5–10.1)
CHLORIDE SERPL-SCNC: 105 MMOL/L (ref 97–108)
CHOLEST SERPL-MCNC: 139 MG/DL
CO2 SERPL-SCNC: 31 MMOL/L (ref 21–32)
CREAT SERPL-MCNC: 0.78 MG/DL (ref 0.55–1.02)
EST. AVERAGE GLUCOSE BLD GHB EST-MCNC: 128 MG/DL
GLOBULIN SER CALC-MCNC: 3.5 G/DL (ref 2–4)
GLUCOSE SERPL-MCNC: 125 MG/DL (ref 65–100)
HBA1C MFR BLD: 6.1 % (ref 4–5.6)
HDLC SERPL-MCNC: 55 MG/DL
HDLC SERPL: 2.5 (ref 0–5)
LDLC SERPL CALC-MCNC: 56.8 MG/DL (ref 0–100)
POTASSIUM SERPL-SCNC: 4 MMOL/L (ref 3.5–5.1)
PROT SERPL-MCNC: 7.3 G/DL (ref 6.4–8.2)
SODIUM SERPL-SCNC: 138 MMOL/L (ref 136–145)
TRIGL SERPL-MCNC: 136 MG/DL
VLDLC SERPL CALC-MCNC: 27.2 MG/DL

## 2023-08-09 ENCOUNTER — TELEPHONE (OUTPATIENT)
Facility: CLINIC | Age: 63
End: 2023-08-09

## 2023-08-09 NOTE — TELEPHONE ENCOUNTER
Trulicity 1.5 WF/9.1 ml pen not covered per  [facility] AND [facility]    Ms. Rita [place] will call her insurance.

## 2023-08-24 ENCOUNTER — HOSPITAL ENCOUNTER (OUTPATIENT)
Facility: HOSPITAL | Age: 63
Setting detail: OUTPATIENT SURGERY
Discharge: HOME | End: 2023-08-24
Attending: INTERNAL MEDICINE | Admitting: INTERNAL MEDICINE
Payer: COMMERCIAL

## 2023-08-24 VITALS
DIASTOLIC BLOOD PRESSURE: 79 MMHG | HEART RATE: 75 BPM | RESPIRATION RATE: 17 BRPM | OXYGEN SATURATION: 97 % | SYSTOLIC BLOOD PRESSURE: 151 MMHG

## 2023-08-24 PROCEDURE — 3600007512: Performed by: INTERNAL MEDICINE

## 2023-08-24 PROCEDURE — 6370000000 HC RX 637 (ALT 250 FOR IP): Performed by: INTERNAL MEDICINE

## 2023-08-24 PROCEDURE — 2709999900 HC NON-CHARGEABLE SUPPLY: Performed by: INTERNAL MEDICINE

## 2023-08-24 PROCEDURE — 3600007502: Performed by: INTERNAL MEDICINE

## 2023-08-24 PROCEDURE — 7100000010 HC PHASE II RECOVERY - FIRST 15 MIN: Performed by: INTERNAL MEDICINE

## 2023-08-24 RX ORDER — LIDOCAINE HYDROCHLORIDE 20 MG/ML
JELLY TOPICAL ONCE
Status: COMPLETED | OUTPATIENT
Start: 2023-08-24 | End: 2023-08-24

## 2023-08-24 RX ORDER — MELOXICAM 15 MG/1
TABLET ORAL
COMMUNITY
Start: 2023-08-17

## 2023-08-24 RX ADMIN — LIDOCAINE HYDROCHLORIDE 6 ML: 20 JELLY TOPICAL at 10:22

## 2023-08-24 ASSESSMENT — PAIN - FUNCTIONAL ASSESSMENT
PAIN_FUNCTIONAL_ASSESSMENT: NONE - DENIES PAIN
PAIN_FUNCTIONAL_ASSESSMENT: NONE - DENIES PAIN

## 2023-08-24 NOTE — DISCHARGE INSTRUCTIONS
Joann Edil  [id number]  1960      MANOMETRY DISCHARGE INSTRUCTION    You may resume your regular diet as tolerated. You may resume your normal daily activities. If you develop a sore throat- throat lozenges or warm salt water gargles will help. Call your Physician if you have any complications or questions. Discussed with the patient and all questioned fully answered. She will call me if any problems arise.

## 2023-09-01 NOTE — PROCEDURES
Esophogeal Manometry Procedure Note  Rik Huang  : 1960  [facility] Medical Record Number: [id number]    ESOPHAGEAL MANOMETRY REPORT    ORDERING PROVIDER: Dr. Pro Ku: 23    PRE PROCEDURE DIAGNOSIS:  Dysphagia    SPECIMENS: None    ANESTHESIA: Topical    ESTIMATED BLOOD LOSS: None    COMPLICATIONS: None    IMPLANTS: None    PROCEDURE IN DETAIL: High resolution manometry was performed by the nursing staff with subsequent interpretation by Jenaro Roque MD.     LES: Mean basal LES pressure is normal. Median IRP in supine position is normal. Median IRP in seated position is normal. There is no evidence of hiatal hernia manometrically. Esophageal Body: 10 wet swallows in the supine position were analyzed. Contraction pattern is normal in all swallows. Contraction vigor is normal in all swallows. Bolus clearance by impedance analysis was performed and showed complete clearance in all swallows. IMPRESSION: No evidence of any Kilgore Classification abnormality.

## 2023-09-12 NOTE — PROGRESS NOTES
Consent: Yunier Pelayo, who was seen by synchronous (real-time) audio-video technology, and/or her healthcare decision maker, is aware that this patient-initiated, Telehealth encounter on 6/30/2022 is a billable service, with coverage as determined by her insurance carrier. She is aware that she may receive a bill and has provided verbal consent to proceed: Yes. Assessment & Plan:   Diagnoses and all orders for this visit:    1. Mixed stress and urge urinary incontinence  -     REFERRAL TO UROLOGY  -     solifenacin (VESICARE) 5 mg tablet; Take 1 Tablet by mouth daily. 2. Overactive bladder  -     REFERRAL TO UROLOGY  -     solifenacin (VESICARE) 5 mg tablet; Take 1 Tablet by mouth daily. 3. Chronic bilateral low back pain with bilateral sciatica  -     REFERRAL TO ORTHOPEDICS  -     REFERRAL TO PHYSICAL THERAPY    4. Neck pain  -     REFERRAL TO ORTHOPEDICS  -     REFERRAL TO PHYSICAL THERAPY          Follow-up and Dispositions    · Return if symptoms worsen or fail to improve. I ADVISED PATIENT TO GO TO ER IF SYMPTOMS WORSEN , CHANGE OR FAILS TO IMPROVE. I spent at least 15 minutes with this established patient, and >50% of the time was spent counseling and/or coordinating care regarding SEE BELOW  712  Subjective:   Yunier Pelayo is a 63 y.o. 1960 female  established patient, who was seen for Constipation, Diarrhea, and Follow-up (Medication )          1. Mixed stress and urge urinary incontinence  Patient reports symptoms of stress and urge incontinence. She has seen a urologist in the past.  She does not remember the name of the urologist or the office address. She was started on Vesicare to help improve her symptoms. Patient reports symptoms under control with current medication. I have advised her to make appoint with urologist.  Referral information given to the patient today. 2. Overactive bladder  Refill given for Vesicare.     3. Chronic bilateral low back pain Pt requests these be sent to [address] [place] [address] mail order    Last visit 8-22-23  Next visit 11-22-23 with bilateral sciatica  4. Neck pain  Patient reports symptoms of chronic low back pain and chronic neck pain. Patient has had MRIs done in the past.  I reviewed the MRI reports today. Patient has seen orthopedic at [facility]. She has already undergone physical therapy in the past.  She is requesting referral for pool therapy at [facility]. I will fax referral today. Advised her to make appointment with Ortho for further evaluation and management. Patient reports she is scheduled for colonoscopy and EGD next month. She has an appointment for that with gastroenterologist.  She will discuss her symptoms of IBS at that time with her gastroenterologist.    Prior to Admission medications    Medication Sig Start Date End Date Taking? Authorizing Provider   solifenacin (VESICARE) 5 mg tablet Take 1 Tablet by mouth daily. 6/30/22  Yes Surjit Rodrigues MD   furosemide (LASIX) 20 mg tablet TAKE 1 TABLET BY MOUTH DAILY AS NEEDED FOR LEG SWELLING 6/15/22  Yes Surjit Rodrigues MD   Omeprazole delayed release (PRILOSEC D/R) 20 mg tablet Take 1 Tablet by mouth daily. 5/6/22  Yes Willian Obrien NP   metFORMIN (GLUCOPHAGE) 500 mg tablet TAKE 2 TABS BY MOUTH TWO (2) TIMES DAILY (WITH MEALS). 3/14/22  Yes Surjit Rodrigues MD   fexofenadine (ALLEGRA) 180 mg tablet Take 1 Tablet by mouth daily. 10/13/21  Yes Surjit Rodrigues MD   glipiZIDE (GLUCOTROL) 10 mg tablet Take 1 Tablet by mouth daily (with lunch). 10/13/21  Yes Surjit Rodrigues MD   bisacodyL (DULCOLAX) 5 mg EC tablet Take 5 mg by mouth. As needed   Yes Provider, Historical   Nebulizer Accessories kit Use face mask & tubing for nebulizer machine 12/21/20  Yes Surjit Rodrigues MD   ciclopirox (PENLAC) 8 % solution APPLY TOPICALLY ONCE DAILY 9/28/20  Yes Provider, Historical   ferrous sulfate (Iron) 325 mg (65 mg iron) tablet Take  by mouth Daily (before breakfast).    Yes Provider, Historical   cholecalciferol (Vitamin D3) (1000 Units /25 mcg) tablet Take  by mouth daily. Yes Provider, Historical   docusate sodium (Stool Softener) 100 mg capsule Take 100 mg by mouth as needed. Yes Provider, Historical   Nebulizer & Compressor machine Use every 6 hours as needed for shortness of breath 1/29/20  Yes Gemma Callaway MD   multivitamin (DAILY VITAMINS PO) Take  by mouth. Yes Provider, Historical   omega 3-DHA-EPA-fish oil (FISH OIL) 1,000 mg (120 mg-180 mg) capsule Take 1 Cap by mouth daily. Indications: high amount of triglyceride in the blood 1/21/20  Yes Gemma Callaway MD   latanoprost (XALATAN) 0.005 % ophthalmic solution Administer 1 Drop to both eyes nightly. Yes Provider, Historical   solifenacin (VESICARE) 5 mg tablet Take 1 Tablet by mouth daily. 6/27/22 6/30/22  Gemma Callaway MD   diclofenac EC (VOLTAREN) 50 mg EC tablet Take 1 Tab by mouth two (2) times a day. Patient taking differently: Take 50 mg by mouth as needed. 12/15/20   Gemma Callaway MD   budesonide-formoterol (SYMBICORT) 80-4.5 mcg/actuation HFAA Take 2 Puffs by inhalation two (2) times a day. Patient taking differently: Take 2 Puffs by inhalation as needed. 11/29/18   Héctor Carlson MD   montelukast (SINGULAIR) 10 mg tablet Take 1 Tab by mouth daily. Patient not taking: Reported on 6/30/2022 11/29/18   Héctor Carlson MD     Allergies   Allergen Reactions    Metformin Nausea Only    Percocet [Oxycodone-Acetaminophen] Other (comments)     \"It makes me feel bad, real bad,like I have the flu. \"       Patient Active Problem List   Diagnosis Code    Glucose intolerance (impaired glucose tolerance) R73.02    Anemia D64.9    Snoring R06.83    Obstructive sleep apnea syndrome G47.33    Severe obesity (HCC) E66.01    SOB (shortness of breath) R06.02    Pain of right hip joint M25.551    Stridor R06.1    Overactive bladder N32.81    Mixed stress and urge urinary incontinence N39.46     Patient Active Problem List    Diagnosis Date Noted    Overactive bladder 06/30/2022    Mixed stress and urge urinary incontinence 06/30/2022    Stridor 11/28/2018    Severe obesity ([address] 10/10/2018    SOB (shortness of breath) 10/10/2018    Pain of right hip joint 10/10/2018    Obstructive sleep apnea syndrome 02/02/2018    Snoring 11/29/2017    Glucose intolerance (impaired glucose tolerance)     Anemia      Current Outpatient Medications   Medication Sig Dispense Refill    solifenacin (VESICARE) 5 mg tablet Take 1 Tablet by mouth daily. 90 Tablet 3    furosemide (LASIX) 20 mg tablet TAKE 1 TABLET BY MOUTH DAILY AS NEEDED FOR LEG SWELLING 30 Tablet 1    Omeprazole delayed release (PRILOSEC D/R) 20 mg tablet Take 1 Tablet by mouth daily. 90 Tablet 1    metFORMIN (GLUCOPHAGE) 500 mg tablet TAKE 2 TABS BY MOUTH TWO (2) TIMES DAILY (WITH MEALS). 360 Tablet 1    fexofenadine (ALLEGRA) 180 mg tablet Take 1 Tablet by mouth daily. 90 Tablet 5    glipiZIDE (GLUCOTROL) 10 mg tablet Take 1 Tablet by mouth daily (with lunch). 180 Tablet 3    bisacodyL (DULCOLAX) 5 mg EC tablet Take 5 mg by mouth. As needed     [facility] Use face mask & tubing for nebulizer machine 1 Kit 0    ciclopirox (PENLAC) 8 % solution APPLY TOPICALLY ONCE DAILY      ferrous sulfate (Iron) 325 mg (65 mg iron) tablet Take  by mouth Daily (before breakfast).  cholecalciferol (Vitamin D3) (1000 Units /25 mcg) tablet Take  by mouth daily.  docusate sodium (Stool Softener) 100 mg capsule Take 100 mg by mouth as needed.  Nebulizer & Compressor machine Use every 6 hours as needed for shortness of breath 1 Each 0    multivitamin (DAILY VITAMINS PO) Take  by mouth.  omega 3-DHA-EPA-fish oil (FISH OIL) 1,000 mg (120 mg-180 mg) capsule Take 1 Cap by mouth daily. Indications: high amount of triglyceride in the blood 90 Cap 1    latanoprost (XALATAN) 0.005 % ophthalmic solution Administer 1 Drop to both eyes nightly.  diclofenac EC (VOLTAREN) 50 mg EC tablet Take 1 Tab by mouth two (2) times a day. (Patient taking differently: Take 50 mg by mouth as needed.) 60 Tab 5    budesonide-formoterol (SYMBICORT) 80-4.5 mcg/actuation HFAA Take 2 Puffs by inhalation two (2) times a day. (Patient taking differently: Take 2 Puffs by inhalation as needed.) 1 Inhaler 1    montelukast (SINGULAIR) 10 mg tablet Take 1 Tab by mouth daily. (Patient not taking: Reported on 2022) 30 Tab 1     Allergies   Allergen Reactions    Metformin Nausea Only    Percocet [Oxycodone-Acetaminophen] Other (comments)     \"It makes me feel bad, real bad,like I have the flu. \"     Past Medical History:   Diagnosis Date    Adverse effect of anesthesia     WITHIN 20-30 MINUTES AFTER WAKING UP FROM CARPAL TUNNEL SURGERY, HAD DIFFICULTY BREATHING ,     Anemia     Anxiety     Glucose intolerance (impaired glucose tolerance)     Menopause 2018     Past Surgical History:   Procedure Laterality Date    HX CARPAL TUNNEL RELEASE Bilateral     HX  SECTION      HX HYSTERECTOMY  2018    HX LAP CHOLECYSTECTOMY      HX OOPHORECTOMY Bilateral 2018    HX ORTHOPAEDIC Left 2015    SPUR REMOVED TO LEFT FOOT    HX ORTHOPAEDIC Left     SPIN AND SCREW IN LEFT ARM     Family History   Problem Relation Age of Onset    Stroke Mother     Diabetes Mother     Other Mother         BRAIN ANURYSM     Diabetes Father     Kidney Disease Father     Hypertension Father     Diabetes Sister     Alzheimer's Disease Maternal Grandmother     Anesth Problems Neg Hx      Social History     Tobacco Use    Smoking status: Former Smoker     Packs/day: 0.25     Years: 15.00     Pack years: 3.75     Quit date: 1/3/2000     Years since quittin.5    Smokeless tobacco: Never Used   Substance Use Topics    Alcohol use: Yes     Alcohol/week: 1.0 standard drink     Types: 1 Standard drinks or equivalent per week     Comment: OCCASSIONAL       Review of Systems   Constitutional: Negative. HENT: Negative. Eyes: Negative.     Respiratory: Negative. Cardiovascular: Negative. Gastrointestinal: Positive for constipation and diarrhea. Genitourinary: Negative. Musculoskeletal: Positive for back pain and neck pain. Skin: Negative. Neurological: Negative. Endo/Heme/Allergies: Negative. Psychiatric/Behavioral: Negative.             Objective:     Patient-Reported Vitals 6/30/2022   Patient-Reported Weight 242lb   Patient-Reported Pulse -   Patient-Reported Temperature -   Patient-Reported SpO2 -   Patient-Reported Systolic  -   Patient-Reported Diastolic -        [INSTRUCTIONS:  \"[x]\" Indicates a positive item  \"[]\" Indicates a negative item  -- DELETE ALL ITEMS NOT EXAMINED]    Constitutional: [x] Appears well-developed and well-nourished [x] No apparent distress      [] Abnormal -     Mental status: [x] Alert and awake  [x] Oriented to person/place/time [x] Able to follow commands    [] Abnormal -     Eyes:   EOM    [x]  Normal    [] Abnormal -   Sclera  [x]  Normal    [] Abnormal -          Discharge [x]  None visible   [] Abnormal -     HENT: [x] Normocephalic, atraumatic  [] Abnormal -   [x] Mouth/Throat: Mucous membranes are moist    External Ears [x] Normal  [] Abnormal -    Neck: [x] No visualized mass [] Abnormal -     Pulmonary/Chest: [x] Respiratory effort normal   [x] No visualized signs of difficulty breathing or respiratory distress        [] Abnormal -      Musculoskeletal:   [x] Normal gait with no signs of ataxia         [x] Normal range of motion of neck        [] Abnormal -     Neurological:        [x] No Facial Asymmetry (Cranial nerve 7 motor function) (limited exam due to video visit)          [x] No gaze palsy        [] Abnormal -          Skin:        [x] No significant exanthematous lesions or discoloration noted on facial skin         [] Abnormal -            Psychiatric:       [x] Normal Affect [] Abnormal -        [x] No Hallucinations    Other pertinent observable physical exam findings:-    We discussed the expected course, resolution and complications of the diagnosis(es) in detail. Medication risks, benefits, costs, interactions, and alternatives were discussed as indicated. I advised her to contact the office if her condition worsens, changes or fails to improve as anticipated. She expressed understanding with the diagnosis(es) and plan. On this date 06/30/2022 I have spent 25-30 minutes reviewing previous notes, test results and face to face with the patient discussing the diagnosis and importance of compliance with the treatment plan as well as documenting on the day of the visit. Connie Nuñez is a 63 y.o. female  is being evaluated by a Virtual Visit (video visit) encounter to address concerns as mentioned above. A caregiver was present when appropriate. Due to this being a TeleHealth encounter (During [facility]- public health emergency), evaluation of the following organ systems was limited: Vitals/Constitutional/EENT/Resp/CV/GI//MS/Neuro/Skin/Heme-Lymph-Imm. Pursuant to the emergency declaration under the [address] [place] [address], [address] [place] [address] authority and the [employer] and [employer] Act, this Virtual Visit was conducted with patient's (and/or legal guardian's) consent, to reduce the patient's risk of exposure to COVID-19 and provide necessary medical care. The patient (and/or legal guardian) has also been advised to contact this office for worsening conditions or problems, and seek emergency medical treatment and/or call 911 if deemed necessary. Patient identification was verified at the start of the visit: YES    Services were provided through a video synchronous discussion virtually to substitute for in-person clinic visit. Patient was located at their homes. Provider located in office    An electronic signature was used to authenticate this note.       Dotty Hernandez MD

## 2023-09-20 ENCOUNTER — TELEMEDICINE (OUTPATIENT)
Facility: CLINIC | Age: 63
End: 2023-09-20

## 2023-09-20 DIAGNOSIS — E11.9 TYPE 2 DIABETES MELLITUS WITHOUT COMPLICATION, WITHOUT LONG-TERM CURRENT USE OF INSULIN (HCC): Primary | ICD-10-CM

## 2023-09-20 DIAGNOSIS — R74.8 ELEVATED ALKALINE PHOSPHATASE LEVEL: ICD-10-CM

## 2023-09-20 DIAGNOSIS — D64.9 ANEMIA, UNSPECIFIED TYPE: ICD-10-CM

## 2023-09-20 PROBLEM — I10 ESSENTIAL (PRIMARY) HYPERTENSION: Status: ACTIVE | Noted: 2023-09-20

## 2023-09-20 PROBLEM — R06.1 STRIDOR: Status: RESOLVED | Noted: 2018-11-28 | Resolved: 2023-09-20

## 2023-09-20 PROBLEM — E78.2 MIXED HYPERLIPIDEMIA: Status: ACTIVE | Noted: 2023-09-20

## 2023-09-20 RX ORDER — VIBEGRON 75 MG/1
75 TABLET, FILM COATED ORAL DAILY
Qty: 30 TABLET | Status: CANCELLED | OUTPATIENT
Start: 2023-09-20

## 2023-09-20 ASSESSMENT — ENCOUNTER SYMPTOMS
ABDOMINAL PAIN: 0
VOMITING: 0
NAUSEA: 0
RHINORRHEA: 0
COUGH: 0
SHORTNESS OF BREATH: 0

## 2023-09-20 NOTE — PROGRESS NOTES
Is taking 10 mg of glipizide  BS this morning 207    Chief Complaint   Patient presents with    Follow-up     1. Have you been to the ER, urgent care clinic since your last visit? Hospitalized since your last visit? No    2. Have you seen or consulted any other health care providers outside of the [address] [place] [address] Avenue since your last visit? Include any pap smears or colon screening.  No

## 2023-09-20 NOTE — PROGRESS NOTES
Geeta Bates, was evaluated through a synchronous (real-time) audio-video encounter. The patient (or guardian if applicable) is aware that this is a billable service, which includes applicable co-pays. This Virtual Visit was conducted with patient's (and/or legal guardian's) consent. Patient identification was verified, and a caregiver was present when appropriate. The patient was located at Other: Car  Provider was located at [facility] (Appt Dept): Ventura Puente (:  1960) is a Established patient, presenting virtually for evaluation of the following:    Assessment & Plan   Below is the assessment and plan developed based on review of pertinent history, physical exam, labs, studies, and medications. 1. Type 2 diabetes mellitus without complication, without long-term current use of insulin (HCC)  -Chronic. Has been well controlled although recently has had hyperglycemia  -Patient increased her glipizide back to 10 mg twice daily. She continues on Trulicity 1.5 mg weekly  -Patient has now cut back on sugar intake and has noted improvement in her glucoses  -Therefore continue on glipizide 10 mg twice daily and Trulicity 1.5 mg weekly  -Should she note further drop in her glucose and possible hypoglycemia will then cut the glipizide to 5 mg twice daily  -Follow-up in 1.5 months for reevaluation  -At this appointment patient is advised to bring in all of her medications for review    2. Anemia, unspecified type  -Minimal anemia noted on previous lab work  -Patient has a history of iron deficiency. -States she has a long history of iron deficiency and has had a colonoscopy since diagnosis  -Check additional lab work at next office visit    3. Elevated alkaline phosphatase level  -Check additional lab work at next office visit in 1.5 months    Return in about 7 weeks (around 2023), or if symptoms worsen or fail to improve.        Subjective   Patient is a

## 2023-09-25 RX ORDER — EZETIMIBE 10 MG/1
10 TABLET ORAL DAILY
Qty: 30 TABLET | OUTPATIENT
Start: 2023-09-25

## 2023-09-25 RX ORDER — EZETIMIBE 10 MG/1
10 TABLET ORAL DAILY
Qty: 90 TABLET | Refills: 1 | Status: SHIPPED | OUTPATIENT
Start: 2023-09-25

## 2023-09-25 NOTE — TELEPHONE ENCOUNTER
PCP: Pavithra Najera MD    Last appt: [unfilled]  Future Appointments   Date Time Provider [address]   11/2/2023 10:20 AM Pavithra Najera MD [facility] [place] BS AMB       Requested Prescriptions      No prescriptions requested or ordered in this encounter       Prior labs and Blood pressures:  BP Readings from Last 3 Encounters:   08/24/23 (!) 151/79   08/03/23 102/66   05/01/23 (!) 145/82     Lab Results   Component Value Date/Time     08/03/2023 11:40 AM    K 4.0 08/03/2023 11:40 AM     08/03/2023 11:40 AM    CO2 31 08/03/2023 11:40 AM    BUN 10 08/03/2023 11:40 AM    GFRAA >60 10/20/2021 09:04 AM     No results found for: \"HBA1C\", \"[id number]\"  Lab Results   Component Value Date/Time    CHOL 139 08/03/2023 11:40 AM    HDL 55 08/03/2023 11:40 AM    VLDL 30 05/06/2022 12:00 AM     No results found for: \"VITD3\", \"VD3RIA\"    Lab Results   Component Value Date/Time    TSH 1.300 05/20/2021 09:19 AM

## 2023-09-25 NOTE — TELEPHONE ENCOUNTER
PCP: aMrk Schrader MD    Last appt: [unfilled]  Future Appointments   Date Time Provider [address]   11/2/2023 10:20 AM Mark Schrader MD [facility] AMB       Requested Prescriptions     Pending Prescriptions Disp Refills    ezetimibe (ZETIA) 10 MG tablet 30 tablet      Sig: Take 1 tablet by mouth daily     Refused Prescriptions Disp Refills    dulaglutide (TRULICITY) 1.5 RJ/4.8VD SC injection 4 Adjustable Dose Pre-filled Pen Syringe 2     Sig: Inject 0.5 mLs into the skin once a week     Refused By: Venancio CABEZAS     Reason for Refusal: Duplicate Request       Prior labs and Blood pressures:  BP Readings from Last 3 Encounters:   08/24/23 (!) 151/79   08/03/23 102/66   05/01/23 (!) 145/82     Lab Results   Component Value Date/Time     08/03/2023 11:40 AM    K 4.0 08/03/2023 11:40 AM     08/03/2023 11:40 AM    CO2 31 08/03/2023 11:40 AM    BUN 10 08/03/2023 11:40 AM    GFRAA >60 10/20/2021 09:04 AM     No results found for: \"HBA1C\", \"[id number]\"  Lab Results   Component Value Date/Time    CHOL 139 08/03/2023 11:40 AM    HDL 55 08/03/2023 11:40 AM    VLDL 30 05/06/2022 12:00 AM     No results found for: \"VITD3\", \"VD3RIA\"    Lab Results   Component Value Date/Time    TSH 1.300 05/20/2021 09:19 AM

## 2023-10-06 ENCOUNTER — TELEPHONE (OUTPATIENT)
Age: 63
End: 2023-10-06

## 2023-10-11 ENCOUNTER — CLINICAL DOCUMENTATION (OUTPATIENT)
Age: 63
End: 2023-10-11

## 2023-10-11 ENCOUNTER — TELEMEDICINE (OUTPATIENT)
Age: 63
End: 2023-10-11
Payer: COMMERCIAL

## 2023-10-11 ENCOUNTER — TELEPHONE (OUTPATIENT)
Age: 63
End: 2023-10-11

## 2023-10-11 VITALS — BODY MASS INDEX: 37.61 KG/M2 | WEIGHT: 233 LBS

## 2023-10-11 DIAGNOSIS — G47.33 OBSTRUCTIVE SLEEP APNEA (ADULT) (PEDIATRIC): Primary | ICD-10-CM

## 2023-10-11 PROCEDURE — 99213 OFFICE O/P EST LOW 20 MIN: CPT | Performed by: NURSE PRACTITIONER

## 2023-10-11 ASSESSMENT — SLEEP AND FATIGUE QUESTIONNAIRES
HOW LIKELY ARE YOU TO NOD OFF OR FALL ASLEEP WHILE SITTING QUIETLY AFTER LUNCH WITHOUT ALCOHOL: 3
HOW LIKELY ARE YOU TO NOD OFF OR FALL ASLEEP WHILE SITTING AND READING: 2
HOW LIKELY ARE YOU TO NOD OFF OR FALL ASLEEP WHILE SITTING INACTIVE IN A PUBLIC PLACE: 0
HOW LIKELY ARE YOU TO NOD OFF OR FALL ASLEEP WHILE SITTING AND TALKING TO SOMEONE: 0
HOW LIKELY ARE YOU TO NOD OFF OR FALL ASLEEP IN A CAR, WHILE STOPPED FOR A FEW MINUTES IN TRAFFIC: 0
HOW LIKELY ARE YOU TO NOD OFF OR FALL ASLEEP WHILE LYING DOWN TO REST IN THE AFTERNOON WHEN CIRCUMSTANCES PERMIT: 3
ESS TOTAL SCORE: 13
HOW LIKELY ARE YOU TO NOD OFF OR FALL ASLEEP WHEN YOU ARE A PASSENGER IN A CAR FOR AN HOUR WITHOUT A BREAK: 2
HOW LIKELY ARE YOU TO NOD OFF OR FALL ASLEEP WHILE WATCHING TV: 3

## 2023-10-11 NOTE — PATIENT INSTRUCTIONS
[employer], [address]  Tel.  [phone]  Fax. [address] [facility], [facility] Wei Handley  Tel.  [phone]  Fax. [phone] [address]  Tel.  [phone]  Fax. [phone]     Learning About CPAP for Sleep Apnea  What is CPAP? CPAP is a small machine that you use at home every night while you sleep. It increases air pressure in your throat to keep your airway open. When you have sleep apnea, this can help you sleep better so you feel much better. CPAP stands for \"continuous positive airway pressure. \"  The CPAP machine will have one of the following:  A mask that covers your nose and mouth  Prongs that fit into your nose  A mask that covers your nose only, the most common type. This type is called NCPAP. The N stands for \"nasal.\"  Why is it done? CPAP is usually the best treatment for obstructive sleep apnea. It is the first treatment choice and the most widely used. Your doctor may suggest CPAP if you have: Moderate to severe sleep apnea. Sleep apnea and coronary artery disease (CAD) or heart failure. How does it help? CPAP can help you have more normal sleep, so you feel less sleepy and more alert during the daytime. CPAP may help keep heart failure or other heart problems from getting worse. NCPAP may help lower your blood pressure. If you use CPAP, your bed partner may also sleep better because you are not snoring or restless. What are the side effects? Some people who use CPAP have:  A dry or stuffy nose and a sore throat. Irritated skin on the face. Sore eyes. Bloating. If you have any of these problems, work with your doctor to fix them. Here are some things you can try:  Be sure the mask or nasal prongs fit well. See if your doctor can adjust the pressure of your CPAP. If your nose is dry, try a humidifier.   If your nose is runny or stuffy, try decongestant medicine or a steroid

## 2023-10-11 NOTE — PROGRESS NOTES
Hilario Coy (: 1960) is a 66 y.o. female, established patient, seen for positive airway pressure follow-up, she was last seen by me on 2021, previously seen by Dr. Marianne Huber on 2020, prior notes and sleep testing reviewed in detail. In lab sleep test 2020 showed AHI of 17.8/hr with a lowest SaO2 of 78%, duration of SaO2 < 88% 4.6 min. Weight at time of sleep testing 241 pounds. ASSESSMENT/PLAN:   Diagnosis Orders   1. Obstructive sleep apnea (adult) (pediatric)  DME Order for (Specify) as OP      2. Adult BMI 37.0-37.9 kg/sq m            AHI = 17.8(2020). On APAP, Respironics :  5-12 cmH2O. Set up 2020. DS2 set up 2021. She is not compliant with PAP therapy although when used PAP shows benefit to the patient and remains necessary for control of her sleep apnea. There is continued clinical benefit from the hours of use demonstrated by AHI reduced from 17.8/hr to 1.5/hr. She now has supplies and will restart therapy. Follow-up and Dispositions    Return in about 2 months (around 2023) for compliance follow up - VV ok (can be on midlo schedule if needed). Sleep Apnea -    Continue on current pressures, she is to resume nightly use and follow up with us if she is having issues with pressure comfort    * Supplies ordered - full face mask and heated tubing    Orders Placed This Encounter   Procedures    DME Order for (Specify) as OP     Diagnosis: (G47.33) CAITLIN (obstructive sleep apnea)  (primary encounter diagnosis)     Replacement Supplies for Positive Airway Pressure Therapy Device:   Duration of need: 99 months.  Full Face Mask 1 every 3 months.  Full Face Mask Cushion 1 per month.  Headgear 1 every 6 months.  Pos Airway pressure chin strap     Tubing with heating element 1 every 3 months.  Filter(s) Disposable 2 per month.  Filter(s) Non-Disposable 1 every 6 months.    .   [address] for [employer]

## 2023-10-20 ENCOUNTER — TELEPHONE (OUTPATIENT)
Age: 63
End: 2023-10-20

## 2023-10-20 NOTE — TELEPHONE ENCOUNTER
Pt called stating [facility]. Pt stated Dr. Janna Hyde knows about it. Please advice.

## 2023-10-25 ENCOUNTER — TELEPHONE (OUTPATIENT)
Facility: CLINIC | Age: 63
End: 2023-10-25

## 2023-10-25 NOTE — TELEPHONE ENCOUNTER
Two patient Identification confirmed. Patient states has been seeing Yarelis Combs at [facility] since she was patient of Dr. Charmayne Mosses. Patient states she was in [address] [place] [address] March of 2023. Physical Therapy has been seeing her for B/L Knee, Back, Neck, and B/L Shoulder pain. Patient states Physical therapy instructed her to have provider update Referral with New provider. Old referral was with Dr. Charmayne Mosses who is no longer following her care. Patient states she has missed 3 weeks of Physical Therapy. She does not want to miss next appointment on 10/26/2023.     Please advise

## 2023-10-26 DIAGNOSIS — K21.9 GASTRO-ESOPHAGEAL REFLUX DISEASE WITHOUT ESOPHAGITIS: ICD-10-CM

## 2023-10-26 RX ORDER — OMEPRAZOLE 20 MG/1
CAPSULE, DELAYED RELEASE ORAL DAILY
Qty: 90 CAPSULE | Refills: 0 | Status: SHIPPED | OUTPATIENT
Start: 2023-10-26

## 2023-10-26 NOTE — TELEPHONE ENCOUNTER
Two patient Identification confirmed. Patient scheduled for appointment on 10/26/2023 with Dr. Jostin Smith at 1:40 pm by [facility]. Patient informed Dr. Jostin Smith location at [facility]. Please arrive 20-30 prior to appointment. Patient verbalized understanding. Patient states also having issue with reach office by phone. \"Trouble transferring call, Error, Try again later\"     notified.

## 2023-10-26 NOTE — TELEPHONE ENCOUNTER
PCP: Guillermina Amanda MD    Last appt: [unfilled]  Future Appointments   Date Time Provider [address]   11/2/2023 10:20 AM Guillermina Amanda MD [facility] BS AMB   1/11/2024  2:10 PM [place] [id number]  BS AMB       Requested Prescriptions     Pending Prescriptions Disp Refills    omeprazole (PRILOSEC) 20 MG delayed release capsule [Pharmacy Med Name: OMEPRAZOLE DR 20 MG CAPSULE] 90 capsule 1     Sig: TAKE 1 CAPSULE BY MOUTH EVERY DAY       Prior labs and Blood pressures:  BP Readings from Last 3 Encounters:   08/24/23 (!) 151/79   08/03/23 102/66   05/01/23 (!) 145/82     Lab Results   Component Value Date/Time     08/03/2023 11:40 AM    K 4.0 08/03/2023 11:40 AM     08/03/2023 11:40 AM    CO2 31 08/03/2023 11:40 AM    BUN 10 08/03/2023 11:40 AM    GFRAA >60 10/20/2021 09:04 AM     No results found for: \"HBA1C\", \"CSH2XPCB\"  Lab Results   Component Value Date/Time    CHOL 139 08/03/2023 11:40 AM    HDL 55 08/03/2023 11:40 AM    VLDL 30 05/06/2022 12:00 AM     No results found for: \"VITD3\", \"VD3RIA\"    Lab Results   Component Value Date/Time    TSH 1.300 05/20/2021 09:19 AM

## 2023-10-27 ENCOUNTER — OFFICE VISIT (OUTPATIENT)
Facility: CLINIC | Age: 63
End: 2023-10-27
Payer: COMMERCIAL

## 2023-10-27 VITALS
HEIGHT: 66 IN | TEMPERATURE: 97 F | WEIGHT: 233 LBS | BODY MASS INDEX: 37.45 KG/M2 | RESPIRATION RATE: 16 BRPM | DIASTOLIC BLOOD PRESSURE: 80 MMHG | SYSTOLIC BLOOD PRESSURE: 139 MMHG | HEART RATE: 81 BPM | OXYGEN SATURATION: 98 %

## 2023-10-27 DIAGNOSIS — M54.2 CERVICAL PAIN: ICD-10-CM

## 2023-10-27 DIAGNOSIS — M54.42 CHRONIC LOW BACK PAIN WITH BILATERAL SCIATICA, UNSPECIFIED BACK PAIN LATERALITY: Primary | ICD-10-CM

## 2023-10-27 DIAGNOSIS — M25.511 CHRONIC RIGHT SHOULDER PAIN: ICD-10-CM

## 2023-10-27 DIAGNOSIS — G89.29 CHRONIC LOW BACK PAIN WITH BILATERAL SCIATICA, UNSPECIFIED BACK PAIN LATERALITY: Primary | ICD-10-CM

## 2023-10-27 DIAGNOSIS — M54.41 CHRONIC LOW BACK PAIN WITH BILATERAL SCIATICA, UNSPECIFIED BACK PAIN LATERALITY: Primary | ICD-10-CM

## 2023-10-27 DIAGNOSIS — G89.29 CHRONIC RIGHT SHOULDER PAIN: ICD-10-CM

## 2023-10-27 PROCEDURE — 3079F DIAST BP 80-89 MM HG: CPT | Performed by: STUDENT IN AN ORGANIZED HEALTH CARE EDUCATION/TRAINING PROGRAM

## 2023-10-27 PROCEDURE — 99213 OFFICE O/P EST LOW 20 MIN: CPT | Performed by: STUDENT IN AN ORGANIZED HEALTH CARE EDUCATION/TRAINING PROGRAM

## 2023-10-27 PROCEDURE — 3075F SYST BP GE 130 - 139MM HG: CPT | Performed by: STUDENT IN AN ORGANIZED HEALTH CARE EDUCATION/TRAINING PROGRAM

## 2023-10-27 ASSESSMENT — ENCOUNTER SYMPTOMS
COUGH: 0
BACK PAIN: 1
RHINORRHEA: 0
VOMITING: 0
SHORTNESS OF BREATH: 0
NAUSEA: 0
ABDOMINAL PAIN: 0
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
## 2023-10-31 ENCOUNTER — TRANSCRIBE ORDERS (OUTPATIENT)
Facility: HOSPITAL | Age: 63
End: 2023-10-31

## 2023-10-31 DIAGNOSIS — Z12.31 SCREENING MAMMOGRAM FOR BREAST CANCER: Primary | ICD-10-CM

## 2023-10-31 RX ORDER — TIZANIDINE 4 MG/1
TABLET ORAL
COMMUNITY
Start: 2023-10-03 | End: 2023-11-02

## 2023-10-31 RX ORDER — ALBUTEROL SULFATE 90 UG/1
2 AEROSOL, METERED RESPIRATORY (INHALATION) EVERY 4 HOURS PRN
COMMUNITY
Start: 2019-12-08

## 2023-10-31 RX ORDER — PRAZOSIN HYDROCHLORIDE 5 MG/1
CAPSULE ORAL
COMMUNITY
Start: 2023-10-09

## 2023-10-31 RX ORDER — GABAPENTIN 600 MG/1
600 TABLET ORAL 2 TIMES DAILY
COMMUNITY
Start: 2023-10-22

## 2023-10-31 RX ORDER — ESCITALOPRAM OXALATE 20 MG/1
20 TABLET ORAL DAILY
COMMUNITY
Start: 2023-09-18

## 2023-10-31 RX ORDER — BUPROPION HYDROCHLORIDE 150 MG/1
150 TABLET ORAL EVERY MORNING
COMMUNITY
Start: 2023-09-18

## 2023-10-31 RX ORDER — KETOCONAZOLE 20 MG/G
CREAM TOPICAL
COMMUNITY
Start: 2023-09-21

## 2023-10-31 RX ORDER — ARIPIPRAZOLE 15 MG/1
15 TABLET ORAL DAILY
COMMUNITY
Start: 2023-09-18

## 2023-11-02 ENCOUNTER — OFFICE VISIT (OUTPATIENT)
Facility: CLINIC | Age: 63
End: 2023-11-02
Payer: COMMERCIAL

## 2023-11-02 VITALS
HEIGHT: 66 IN | BODY MASS INDEX: 37.61 KG/M2 | HEART RATE: 78 BPM | WEIGHT: 234 LBS | OXYGEN SATURATION: 98 % | SYSTOLIC BLOOD PRESSURE: 126 MMHG | RESPIRATION RATE: 16 BRPM | TEMPERATURE: 98.1 F | DIASTOLIC BLOOD PRESSURE: 85 MMHG

## 2023-11-02 DIAGNOSIS — I10 ESSENTIAL (PRIMARY) HYPERTENSION: ICD-10-CM

## 2023-11-02 DIAGNOSIS — R74.8 ELEVATED ALKALINE PHOSPHATASE LEVEL: ICD-10-CM

## 2023-11-02 DIAGNOSIS — E11.9 TYPE 2 DIABETES MELLITUS WITHOUT COMPLICATION, WITHOUT LONG-TERM CURRENT USE OF INSULIN (HCC): Primary | ICD-10-CM

## 2023-11-02 DIAGNOSIS — D64.9 ANEMIA, UNSPECIFIED TYPE: ICD-10-CM

## 2023-11-02 PROBLEM — J45.909 ASTHMA: Status: ACTIVE | Noted: 2023-11-02

## 2023-11-02 PROBLEM — R06.83 SNORING: Status: RESOLVED | Noted: 2017-11-29 | Resolved: 2023-11-02

## 2023-11-02 PROBLEM — R06.02 SOB (SHORTNESS OF BREATH): Status: RESOLVED | Noted: 2018-10-10 | Resolved: 2023-11-02

## 2023-11-02 PROCEDURE — 99214 OFFICE O/P EST MOD 30 MIN: CPT | Performed by: STUDENT IN AN ORGANIZED HEALTH CARE EDUCATION/TRAINING PROGRAM

## 2023-11-02 PROCEDURE — 3079F DIAST BP 80-89 MM HG: CPT | Performed by: STUDENT IN AN ORGANIZED HEALTH CARE EDUCATION/TRAINING PROGRAM

## 2023-11-02 PROCEDURE — 3074F SYST BP LT 130 MM HG: CPT | Performed by: STUDENT IN AN ORGANIZED HEALTH CARE EDUCATION/TRAINING PROGRAM

## 2023-11-02 PROCEDURE — 3044F HG A1C LEVEL LT 7.0%: CPT | Performed by: STUDENT IN AN ORGANIZED HEALTH CARE EDUCATION/TRAINING PROGRAM

## 2023-11-02 RX ORDER — SEMAGLUTIDE 0.68 MG/ML
INJECTION, SOLUTION SUBCUTANEOUS
Qty: 3 ML | Refills: 1 | Status: SHIPPED | OUTPATIENT
Start: 2023-11-02 | End: 2024-01-25

## 2023-11-02 RX ORDER — DOCUSATE SODIUM 100 MG/1
100 TABLET ORAL AS NEEDED
COMMUNITY

## 2023-11-02 RX ORDER — NORTRIPTYLINE HYDROCHLORIDE 10 MG/1
10 CAPSULE ORAL NIGHTLY
COMMUNITY

## 2023-11-02 RX ORDER — ANTIARTHRITIC COMBINATION NO.2 900 MG
1 TABLET ORAL DAILY
COMMUNITY

## 2023-11-02 RX ORDER — TIZANIDINE 4 MG/1
4 TABLET ORAL EVERY 8 HOURS PRN
COMMUNITY

## 2023-11-02 ASSESSMENT — PATIENT HEALTH QUESTIONNAIRE - PHQ9
3. TROUBLE FALLING OR STAYING ASLEEP: 0
6. FEELING BAD ABOUT YOURSELF - OR THAT YOU ARE A FAILURE OR HAVE LET YOURSELF OR YOUR FAMILY DOWN: 0
7. TROUBLE CONCENTRATING ON THINGS, SUCH AS READING THE NEWSPAPER OR WATCHING TELEVISION: 0
5. POOR APPETITE OR OVEREATING: 0
SUM OF ALL RESPONSES TO PHQ QUESTIONS 1-9: 2
SUM OF ALL RESPONSES TO PHQ QUESTIONS 1-9: 2
9. THOUGHTS THAT YOU WOULD BE BETTER OFF DEAD, OR OF HURTING YOURSELF: 0
10. IF YOU CHECKED OFF ANY PROBLEMS, HOW DIFFICULT HAVE THESE PROBLEMS MADE IT FOR YOU TO DO YOUR WORK, TAKE CARE OF THINGS AT HOME, OR GET ALONG WITH OTHER PEOPLE: 0
SUM OF ALL RESPONSES TO PHQ QUESTIONS 1-9: 2
SUM OF ALL RESPONSES TO PHQ QUESTIONS 1-9: 2
2. FEELING DOWN, DEPRESSED OR HOPELESS: 1
4. FEELING TIRED OR HAVING LITTLE ENERGY: 0
SUM OF ALL RESPONSES TO PHQ9 QUESTIONS 1 & 2: 2
1. LITTLE INTEREST OR PLEASURE IN DOING THINGS: 1
8. MOVING OR SPEAKING SO SLOWLY THAT OTHER PEOPLE COULD HAVE NOTICED. OR THE OPPOSITE, BEING SO FIGETY OR RESTLESS THAT YOU HAVE BEEN MOVING AROUND A LOT MORE THAN USUAL: 0

## 2023-11-02 ASSESSMENT — COLUMBIA-SUICIDE SEVERITY RATING SCALE - C-SSRS
7. DID THIS OCCUR IN THE LAST THREE MONTHS: NO
3. HAVE YOU BEEN THINKING ABOUT HOW YOU MIGHT KILL YOURSELF?: NO
4. HAVE YOU HAD THESE THOUGHTS AND HAD SOME INTENTION OF ACTING ON THEM?: NO
5. HAVE YOU STARTED TO WORK OUT OR WORKED OUT THE DETAILS OF HOW TO KILL YOURSELF? DO YOU INTEND TO CARRY OUT THIS PLAN?: NO

## 2023-11-02 ASSESSMENT — ENCOUNTER SYMPTOMS
VOMITING: 0
COUGH: 0
NAUSEA: 0
ABDOMINAL PAIN: 0
SHORTNESS OF BREATH: 0
RHINORRHEA: 0

## 2023-11-02 NOTE — PROGRESS NOTES
Assessment/Plan:     Diagnoses and all orders for this visit:    Type 2 diabetes mellitus without complication, without long-term current use of insulin (HCC)  -     CBC with Auto Differential; Future  -     Basic Metabolic Panel; Future  -     Hepatic Function Panel; Future  -     Hemoglobin A1C; Future  -     Semaglutide,0.25 or 0.5MG/DOS, (OZEMPIC, 0.25 OR 0.5 MG/DOSE,) 2 MG/3ML SOPN; Inject 0.25 mg into the skin every 7 days for 28 days, THEN 0.5 mg every 7 days.  -Chronic. Home fasting blood sugars seem labile  -Patient desires to switch Trulicity to Ozempic  -Therefore will stop Trulicity and start Ozempic 0.5 mg weekly 3 to 4 weeks then increase to 0.5 mg weekly thereafter  -Continue current dosage of glipizide 10 mg BID  -Continue working on diabetic diet  -Continue with routine diabetic eye exams    Essential (primary) hypertension  -     CBC with Auto Differential; Future  -     Basic Metabolic Panel; Future  -     Hepatic Function Panel; Future  -Chronic. Stable. -Continue losartan 50 mg daily and have have a good night sleep  -Check routine lab work today    Anemia, unspecified type  -     CBC with Auto Differential; Future  -     Iron and TIBC; Future  -     Vitamin B12 & Folate; Future  -Previously noted anemia  -Repeat lab work today for further evaluation    Elevated alkaline phosphatase level  -     Hepatic Function Panel; Future  -     Alkaline Phosphatase, Isoenzymes; Future  -     Gamma GT; Future  -Previously noted elevated alkaline phosphatase  -Repeat lab work today for further evaluation      Return in about 3 months (around 2/2/2024), or if symptoms worsen or fail to improve. Discussed expected course/resolution/complications of diagnosis in detail with patient. Medication risks/benefits/costs/interactions/alternatives discussed with patient.     Pt expressed understanding with the diagnosis and plan      Subjective:      Amilcar Jackson is a 66 y.o. female who presents for had

## 2023-11-19 ENCOUNTER — HOSPITAL ENCOUNTER (OUTPATIENT)
Facility: HOSPITAL | Age: 63
Discharge: HOME | End: 2023-11-22
Attending: ORTHOPAEDIC SURGERY
Payer: COMMERCIAL

## 2023-11-19 DIAGNOSIS — M25.511 RIGHT SHOULDER PAIN, UNSPECIFIED CHRONICITY: ICD-10-CM

## 2023-11-19 PROCEDURE — 73221 MRI JOINT UPR EXTREM W/O DYE: CPT

## 2024-01-03 ENCOUNTER — TELEPHONE (OUTPATIENT)
Age: 64
End: 2024-01-03

## 2024-01-08 DIAGNOSIS — I10 ESSENTIAL (PRIMARY) HYPERTENSION: ICD-10-CM

## 2024-01-08 RX ORDER — LOSARTAN POTASSIUM 50 MG/1
50 TABLET ORAL DAILY
Qty: 90 TABLET | Refills: 1 | Status: SHIPPED | OUTPATIENT
Start: 2024-01-08

## 2024-01-08 NOTE — TELEPHONE ENCOUNTER
Chief Complaint   Patient presents with    Medication Refill       Requested Prescriptions     Pending Prescriptions Disp Refills    losartan (COZAAR) 50 MG tablet 90 tablet 0     Sig: Take 1 tablet by mouth daily       Allergies:  Allergies   Allergen Reactions    Alprazolam Other (See Comments)    Metformin Nausea Only    Oxycodone-Acetaminophen Other (See Comments)     \"It makes me feel bad, real bad,like I have the flu.\"       Last visit with clinic:  11/2/2023   Next visit with clinic: 2/1/2024     Last visit with this provider: 11/2/2023   Next Visit with this provider: 2/1/2024    Signed by Niya Abel MA CMA  01/08/24  4:27 PM

## 2024-01-24 LAB
ALBUMIN SERPL-MCNC: 4.3 G/DL (ref 3.9–4.9)
ALP SERPL-CCNC: 153 IU/L (ref 44–121)
ALT SERPL-CCNC: 24 IU/L (ref 0–32)
AST SERPL-CCNC: 18 IU/L (ref 0–40)
BASOPHILS # BLD AUTO: 0.1 X10E3/UL (ref 0–0.2)
BASOPHILS NFR BLD AUTO: 1 %
BILIRUB DIRECT SERPL-MCNC: 0.17 MG/DL (ref 0–0.4)
BILIRUB SERPL-MCNC: 0.5 MG/DL (ref 0–1.2)
BUN SERPL-MCNC: 10 MG/DL (ref 8–27)
BUN/CREAT SERPL: 13 (ref 12–28)
CALCIUM SERPL-MCNC: 9.7 MG/DL (ref 8.7–10.3)
CHLORIDE SERPL-SCNC: 98 MMOL/L (ref 96–106)
CO2 SERPL-SCNC: 23 MMOL/L (ref 20–29)
CREAT SERPL-MCNC: 0.79 MG/DL (ref 0.57–1)
EGFRCR SERPLBLD CKD-EPI 2021: 85 ML/MIN/1.73
EOSINOPHIL # BLD AUTO: 0.2 X10E3/UL (ref 0–0.4)
EOSINOPHIL NFR BLD AUTO: 3 %
ERYTHROCYTE [DISTWIDTH] IN BLOOD BY AUTOMATED COUNT: 11 % (ref 11.7–15.4)
FOLATE SERPL-MCNC: 17.9 NG/ML
GGT SERPL-CCNC: 37 IU/L (ref 0–60)
GLUCOSE SERPL-MCNC: 293 MG/DL (ref 70–99)
HBA1C MFR BLD: 11.7 % (ref 4.8–5.6)
HCT VFR BLD AUTO: 41.2 % (ref 34–46.6)
HGB BLD-MCNC: 12.8 G/DL (ref 11.1–15.9)
IMM GRANULOCYTES # BLD AUTO: 0 X10E3/UL (ref 0–0.1)
IMM GRANULOCYTES NFR BLD AUTO: 0 %
IRON SATN MFR SERPL: 23 % (ref 15–55)
IRON SERPL-MCNC: 62 UG/DL (ref 27–139)
LYMPHOCYTES # BLD AUTO: 1.6 X10E3/UL (ref 0.7–3.1)
LYMPHOCYTES NFR BLD AUTO: 23 %
MCH RBC QN AUTO: 26.8 PG (ref 26.6–33)
MCHC RBC AUTO-ENTMCNC: 31.1 G/DL (ref 31.5–35.7)
MCV RBC AUTO: 86 FL (ref 79–97)
MONOCYTES # BLD AUTO: 0.3 X10E3/UL (ref 0.1–0.9)
MONOCYTES NFR BLD AUTO: 5 %
NEUTROPHILS # BLD AUTO: 4.7 X10E3/UL (ref 1.4–7)
NEUTROPHILS NFR BLD AUTO: 68 %
PLATELET # BLD AUTO: 247 X10E3/UL (ref 150–450)
POTASSIUM SERPL-SCNC: 4.4 MMOL/L (ref 3.5–5.2)
PROT SERPL-MCNC: 7.2 G/DL (ref 6–8.5)
RBC # BLD AUTO: 4.77 X10E6/UL (ref 3.77–5.28)
SODIUM SERPL-SCNC: 138 MMOL/L (ref 134–144)
TIBC SERPL-MCNC: 269 UG/DL (ref 250–450)
UIBC SERPL-MCNC: 207 UG/DL (ref 118–369)
VIT B12 SERPL-MCNC: 1150 PG/ML (ref 232–1245)
WBC # BLD AUTO: 6.8 X10E3/UL (ref 3.4–10.8)

## 2024-01-25 LAB
ALP BONE CFR SERPL: 30 % (ref 14–68)
ALP INTEST CFR SERPL: 15 % (ref 0–18)
ALP LIVER CFR SERPL: 55 % (ref 18–85)

## 2024-02-01 ENCOUNTER — OFFICE VISIT (OUTPATIENT)
Facility: CLINIC | Age: 64
End: 2024-02-01
Payer: MEDICAID

## 2024-02-01 VITALS
WEIGHT: 238 LBS | RESPIRATION RATE: 20 BRPM | OXYGEN SATURATION: 96 % | DIASTOLIC BLOOD PRESSURE: 75 MMHG | BODY MASS INDEX: 38.25 KG/M2 | HEIGHT: 66 IN | HEART RATE: 79 BPM | TEMPERATURE: 98.3 F | SYSTOLIC BLOOD PRESSURE: 117 MMHG

## 2024-02-01 DIAGNOSIS — K76.0 HEPATIC STEATOSIS: ICD-10-CM

## 2024-02-01 DIAGNOSIS — E11.65 TYPE 2 DIABETES MELLITUS WITH HYPERGLYCEMIA, WITHOUT LONG-TERM CURRENT USE OF INSULIN (HCC): Primary | ICD-10-CM

## 2024-02-01 PROCEDURE — 3074F SYST BP LT 130 MM HG: CPT | Performed by: STUDENT IN AN ORGANIZED HEALTH CARE EDUCATION/TRAINING PROGRAM

## 2024-02-01 PROCEDURE — 3078F DIAST BP <80 MM HG: CPT | Performed by: STUDENT IN AN ORGANIZED HEALTH CARE EDUCATION/TRAINING PROGRAM

## 2024-02-01 PROCEDURE — 99214 OFFICE O/P EST MOD 30 MIN: CPT | Performed by: STUDENT IN AN ORGANIZED HEALTH CARE EDUCATION/TRAINING PROGRAM

## 2024-02-01 PROCEDURE — 3046F HEMOGLOBIN A1C LEVEL >9.0%: CPT | Performed by: STUDENT IN AN ORGANIZED HEALTH CARE EDUCATION/TRAINING PROGRAM

## 2024-02-01 RX ORDER — DULAGLUTIDE 3 MG/.5ML
3 INJECTION, SOLUTION SUBCUTANEOUS
Qty: 4 ADJUSTABLE DOSE PRE-FILLED PEN SYRINGE | Refills: 2 | Status: SHIPPED | OUTPATIENT
Start: 2024-02-01

## 2024-02-01 RX ORDER — DULAGLUTIDE 1.5 MG/.5ML
1.5 INJECTION, SOLUTION SUBCUTANEOUS WEEKLY
COMMUNITY
End: 2024-02-01 | Stop reason: DRUGHIGH

## 2024-02-01 ASSESSMENT — PATIENT HEALTH QUESTIONNAIRE - PHQ9
1. LITTLE INTEREST OR PLEASURE IN DOING THINGS: 0
SUM OF ALL RESPONSES TO PHQ QUESTIONS 1-9: 0
SUM OF ALL RESPONSES TO PHQ9 QUESTIONS 1 & 2: 0
SUM OF ALL RESPONSES TO PHQ QUESTIONS 1-9: 0
2. FEELING DOWN, DEPRESSED OR HOPELESS: 0
SUM OF ALL RESPONSES TO PHQ QUESTIONS 1-9: 0
SUM OF ALL RESPONSES TO PHQ QUESTIONS 1-9: 0

## 2024-02-01 ASSESSMENT — ENCOUNTER SYMPTOMS
VOMITING: 0
COUGH: 0
NAUSEA: 0
SHORTNESS OF BREATH: 0
RHINORRHEA: 0
ABDOMINAL PAIN: 0

## 2024-02-01 NOTE — PROGRESS NOTES
Identified pt with two pt identifiers(name and ). Reviewed record in preparation for visit and have obtained necessary documentation. All patient medications has been reviewed.  Chief Complaint   Patient presents with    Diabetes    Follow-up       Vitals:    24 0958   BP: 117/75   Pulse: 79   Resp: 20   Temp: 98.3 °F (36.8 °C)   SpO2: 96%                   Coordination of Care Questionnaire:   1) Have you been to an emergency room, urgent care, or hospitalized since your last visit?   no       2. Have seen or consulted any other health care provider since your last visit? no        Patient is accompanied by self I have received verbal consent from Cass Waller to discuss any/all medical information while they are present in the room.

## 2024-02-01 NOTE — PROGRESS NOTES
Assessment/Plan:     Diagnoses and all orders for this visit:    Type 2 diabetes mellitus with hyperglycemia, without long-term current use of insulin ([facility])  -     Dulaglutide (TRULICITY) 3 MG/0.5ML SOPN; Inject 3 mg into the skin every 7 days  -      DIABETES FOOT EXAM  -Recent A1c significantly elevated at 11.7  -Recommend increasing Trulicity from 1.5 to 3 mg weekly  -Continue glipizide 10 mg twice daily  -Continue working on improving diet.  Her previous A1c had been 6.1 therefore suspect with dietary changes her A1c will improve significantly  -Continue with diabetic eye exams  -Follow-up in 1 month with glucose log for reevaluation    Hepatic steatosis  -History of hepatic steatosis  -Does follow with gastroenterology  -Continue working on dietary and lifestyle modifications to promote weight loss      Return in about 1 month (around 3/1/2024), or if symptoms worsen or fail to improve.     Discussed expected course/resolution/complications of diagnosis in detail with patient.    Medication risks/benefits/costs/interactions/alternatives discussed with patient.    Pt expressed understanding with the diagnosis and plan      Subjective:      Cass Waller is a 64 y.o. female who presents for had concerns including Diabetes and Follow-up.     Current Outpatient Medications   Medication Sig Dispense Refill    Dulaglutide (TRULICITY) 3 MG/0.5ML SOPN Inject 3 mg into the skin every 7 days 4 Adjustable Dose Pre-filled Pen Syringe 2    losartan (COZAAR) 50 MG tablet Take 1 tablet by mouth daily 90 tablet 1    nortriptyline (PAMELOR) 10 MG capsule Take 1 capsule by mouth nightly As needed      Docusate Sodium (STOOL SOFTENER) 100 MG TABS Take 100 mg by mouth as needed      Biotin 5000 MCG TABS Take 1 tablet by mouth daily      tiZANidine (ZANAFLEX) 4 MG tablet Take 1 tablet by mouth every 8 hours as needed (muscle spasms)      albuterol sulfate HFA (PROVENTIL;VENTOLIN;PROAIR) 108 (90 Base) MCG/ACT inhaler Inhale 2

## 2024-02-12 DIAGNOSIS — E78.2 MIXED HYPERLIPIDEMIA: ICD-10-CM

## 2024-02-12 NOTE — TELEPHONE ENCOUNTER
PCP: Lorrie Rodriguez MD    Last appt: [unfilled]  Future Appointments   Date Time Provider Department Center   2/29/2024 10:20 AM Lorrie Rodriguez MD [facility] BS AMB   3/28/2024 11:30 AM Payton Valentine APRN - NP [facility] BS AMB       Requested Prescriptions     Pending Prescriptions Disp Refills    rosuvastatin (CRESTOR) 5 MG tablet 90 tablet 1     Sig: Take 1 tablet by mouth nightly       Prior labs and Blood pressures:  BP Readings from Last 3 Encounters:   02/01/24 117/75   11/02/23 126/85   10/27/23 139/80     Lab Results   Component Value Date/Time     01/23/2024 10:07 AM    K 4.4 01/23/2024 10:07 AM    CL 98 01/23/2024 10:07 AM    CO2 23 01/23/2024 10:07 AM    BUN 10 01/23/2024 10:07 AM    GFRAA >60 10/20/2021 09:04 AM     No results found for: \"HBA1C\", \"[id number]\"  Lab Results   Component Value Date/Time    CHOL 139 08/03/2023 11:40 AM    HDL 55 08/03/2023 11:40 AM    VLDL 30 05/06/2022 12:00 AM     No results found for: \"VITD3\", \"VD3RIA\"    Lab Results   Component Value Date/Time    TSH 1.300 05/20/2021 09:19 AM

## 2024-02-15 RX ORDER — ROSUVASTATIN CALCIUM 5 MG/1
5 TABLET, COATED ORAL NIGHTLY
Qty: 90 TABLET | Refills: 1 | Status: SHIPPED | OUTPATIENT
Start: 2024-02-15

## 2024-02-20 PROBLEM — K44.9 HIATAL HERNIA: Status: ACTIVE | Noted: 2024-02-20

## 2024-02-26 ENCOUNTER — TELEPHONE (OUTPATIENT)
Facility: CLINIC | Age: 64
End: 2024-02-26

## 2024-02-26 NOTE — TELEPHONE ENCOUNTER
Patient is calling just wanted to make sure she a refill on her 3mg Trulicity. Also confirmed that she has a appt 2/29/24 @10:20

## 2024-02-29 ENCOUNTER — OFFICE VISIT (OUTPATIENT)
Facility: CLINIC | Age: 64
End: 2024-02-29
Payer: MEDICAID

## 2024-02-29 VITALS
OXYGEN SATURATION: 100 % | TEMPERATURE: 98.1 F | HEIGHT: 66 IN | RESPIRATION RATE: 16 BRPM | WEIGHT: 237 LBS | HEART RATE: 78 BPM | DIASTOLIC BLOOD PRESSURE: 77 MMHG | SYSTOLIC BLOOD PRESSURE: 120 MMHG | BODY MASS INDEX: 38.09 KG/M2

## 2024-02-29 DIAGNOSIS — E11.65 TYPE 2 DIABETES MELLITUS WITH HYPERGLYCEMIA, WITHOUT LONG-TERM CURRENT USE OF INSULIN (HCC): Primary | ICD-10-CM

## 2024-02-29 PROCEDURE — 99213 OFFICE O/P EST LOW 20 MIN: CPT | Performed by: STUDENT IN AN ORGANIZED HEALTH CARE EDUCATION/TRAINING PROGRAM

## 2024-02-29 PROCEDURE — 3046F HEMOGLOBIN A1C LEVEL >9.0%: CPT | Performed by: STUDENT IN AN ORGANIZED HEALTH CARE EDUCATION/TRAINING PROGRAM

## 2024-02-29 PROCEDURE — 3074F SYST BP LT 130 MM HG: CPT | Performed by: STUDENT IN AN ORGANIZED HEALTH CARE EDUCATION/TRAINING PROGRAM

## 2024-02-29 PROCEDURE — 3078F DIAST BP <80 MM HG: CPT | Performed by: STUDENT IN AN ORGANIZED HEALTH CARE EDUCATION/TRAINING PROGRAM

## 2024-02-29 ASSESSMENT — ENCOUNTER SYMPTOMS
VOMITING: 0
COUGH: 0
ABDOMINAL PAIN: 0
SHORTNESS OF BREATH: 0
RHINORRHEA: 0
NAUSEA: 0

## 2024-02-29 NOTE — PROGRESS NOTES
Chief Complaint   Patient presents with    Follow-up     /77   Pulse 78   Temp 98.1 °F (36.7 °C)   Resp 16   Ht 1.676 m (5' 6\")   Wt 107.5 kg (237 lb)   SpO2 100%   BMI 38.25 kg/m²   1. Have you been to the ER, urgent care clinic since your last visit?  Hospitalized since your last visit?No    2. Have you seen or consulted any other health care providers outside of the [facility] since your last visit?  Include any pap smears or colon screening. No    
  Microalbumin/Cr - Completed 2/2023 - normal  Statin Therapy - Crestor 5 mg daily   ACEi/ARB - Losartan 50 mg daily           ROS:   Review of Systems   Constitutional:  Negative for chills and fever.   HENT:  Negative for rhinorrhea.    Respiratory:  Negative for cough and shortness of breath.    Cardiovascular:  Negative for chest pain and leg swelling.   Gastrointestinal:  Negative for abdominal pain, nausea and vomiting.   Neurological:  Negative for dizziness, weakness, numbness and headaches.         Objective:     Vitals:    02/29/24 1013   BP: 120/77   Pulse: 78   Resp: 16   Temp: 98.1 °F (36.7 °C)   SpO2: 100%          Vitals and Nurse Documentation reviewed.     Physical Exam  Constitutional:       General: She is not in acute distress.     Appearance: Normal appearance. She is obese. She is not ill-appearing.   HENT:      Head: Normocephalic and atraumatic.   Eyes:      Conjunctiva/sclera: Conjunctivae normal.   Cardiovascular:      Rate and Rhythm: Normal rate and regular rhythm.      Heart sounds: Normal heart sounds. No murmur heard.     No friction rub. No gallop.   Pulmonary:      Effort: Pulmonary effort is normal. No respiratory distress.      Breath sounds: Normal breath sounds. No wheezing, rhonchi or rales.   Abdominal:      General: Bowel sounds are normal. There is no distension.      Palpations: Abdomen is soft.      Tenderness: There is no abdominal tenderness. There is no guarding or rebound.   Musculoskeletal:      Cervical back: Neck supple.      Right lower leg: No edema.      Left lower leg: No edema.   Neurological:      Mental Status: She is alert and oriented to person, place, and time.      Gait: Gait normal.         No results found for this visit on 02/29/24.

## 2024-03-11 DIAGNOSIS — E11.65 TYPE 2 DIABETES MELLITUS WITH HYPERGLYCEMIA, WITHOUT LONG-TERM CURRENT USE OF INSULIN (HCC): Primary | ICD-10-CM

## 2024-03-11 NOTE — TELEPHONE ENCOUNTER
PCP: Lorrie Rodriguez MD    Last appt: [unfilled]  Future Appointments   Date Time Provider Department Center   3/28/2024 11:30 AM Payton Valentine APRN - [facility] BS AMB   4/25/2024  9:40 AM Lorrie Rodriguez MD [facility] BS AMB       Requested Prescriptions     Pending Prescriptions Disp Refills    glipiZIDE (GLUCOTROL) 10 MG tablet 180 tablet 1     Sig: Take 1 tablet by mouth 2 times daily       Prior labs and Blood pressures:  BP Readings from Last 3 Encounters:   02/29/24 120/77   02/01/24 117/75   11/02/23 126/85     Lab Results   Component Value Date/Time     01/23/2024 10:07 AM    K 4.4 01/23/2024 10:07 AM    CL 98 01/23/2024 10:07 AM    CO2 23 01/23/2024 10:07 AM    BUN 10 01/23/2024 10:07 AM    GFRAA >60 10/20/2021 09:04 AM     No results found for: \"HBA1C\", \"[id number]\"  Lab Results   Component Value Date/Time    CHOL 139 08/03/2023 11:40 AM    HDL 55 08/03/2023 11:40 AM    VLDL 30 05/06/2022 12:00 AM     No results found for: \"VITD3\", \"VD3RIA\"    Lab Results   Component Value Date/Time    TSH 1.300 05/20/2021 09:19 AM

## 2024-03-13 RX ORDER — GLIPIZIDE 10 MG/1
10 TABLET ORAL 2 TIMES DAILY
Qty: 180 TABLET | Refills: 1 | Status: SHIPPED | OUTPATIENT
Start: 2024-03-13

## 2024-03-19 ENCOUNTER — TELEPHONE (OUTPATIENT)
Age: 64
End: 2024-03-19

## 2024-04-19 DIAGNOSIS — E11.9 TYPE 2 DIABETES MELLITUS WITHOUT COMPLICATION, WITHOUT LONG-TERM CURRENT USE OF INSULIN (HCC): ICD-10-CM

## 2024-04-19 RX ORDER — BLOOD SUGAR DIAGNOSTIC
STRIP MISCELLANEOUS
Qty: 100 STRIP | Refills: 3 | Status: SHIPPED | OUTPATIENT
Start: 2024-04-19

## 2024-04-19 NOTE — TELEPHONE ENCOUNTER
PCP: Lorrie Rodriguez MD    Last appt: [unfilled]  No future appointments.    Requested Prescriptions     Pending Prescriptions Disp Refills    blood glucose test strips (ONETOUCH VERIO) strip 100 strip 0     Sig: TEST 1 TIMES A DAY & AS NEEDED FOR SYMPTOMS OF IRREGULAR BLOOD GLUCOSE.       Prior labs and Blood pressures:  BP Readings from Last 3 Encounters:   02/29/24 120/77   02/01/24 117/75   11/02/23 126/85     Lab Results   Component Value Date/Time     01/23/2024 10:07 AM    K 4.4 01/23/2024 10:07 AM    CL 98 01/23/2024 10:07 AM    CO2 23 01/23/2024 10:07 AM    BUN 10 01/23/2024 10:07 AM    GFRAA >60 10/20/2021 09:04 AM     No results found for: \"HBA1C\", \"[id number]\"  Lab Results   Component Value Date/Time    CHOL 139 08/03/2023 11:40 AM    HDL 55 08/03/2023 11:40 AM    VLDL 30 05/06/2022 12:00 AM     No results found for: \"VITD3\", \"VD3RIA\"    Lab Results   Component Value Date/Time    TSH 1.300 05/20/2021 09:19 AM

## 2024-04-24 DIAGNOSIS — E78.2 MIXED HYPERLIPIDEMIA: Primary | ICD-10-CM

## 2024-04-24 RX ORDER — EZETIMIBE 10 MG/1
10 TABLET ORAL DAILY
Qty: 90 TABLET | Refills: 1 | Status: SHIPPED | OUTPATIENT
Start: 2024-04-24

## 2024-04-24 NOTE — TELEPHONE ENCOUNTER
Chief Complaint   Patient presents with    Medication Refill       Requested Prescriptions     Pending Prescriptions Disp Refills    ezetimibe (ZETIA) 10 MG tablet 90 tablet 1     Sig: Take 1 tablet by mouth daily       Allergies:  Allergies   Allergen Reactions    Alprazolam Other (See Comments)     Side effect, not true allergy    Metformin Nausea Only    Oxycodone-Acetaminophen Other (See Comments)     \"It makes me feel bad, real bad,like I have the flu.\" - Not true allergy       Last visit with clinic:  2/29/2024   Next visit with clinic: 5/30/2024     Last visit with this provider: 2/29/2024   Next Visit with this provider: 5/30/2024    Signed by Niya Abel MA [facility]  04/24/24  4:54 PM

## 2024-04-26 ENCOUNTER — TELEPHONE (OUTPATIENT)
Facility: CLINIC | Age: 64
End: 2024-04-26

## 2024-04-26 NOTE — TELEPHONE ENCOUNTER
Patient states because the month had five weeks she is out of the Trulicity and is due for her next dose on Sunday, patient is requesting medication needs to be sent to the pharmacy as soon as possible so she won't miss a dose.

## 2024-04-29 DIAGNOSIS — E11.65 TYPE 2 DIABETES MELLITUS WITH HYPERGLYCEMIA, WITHOUT LONG-TERM CURRENT USE OF INSULIN (HCC): ICD-10-CM

## 2024-04-29 RX ORDER — DULAGLUTIDE 3 MG/.5ML
3 INJECTION, SOLUTION SUBCUTANEOUS
Qty: 4 ADJUSTABLE DOSE PRE-FILLED PEN SYRINGE | Refills: 2 | Status: SHIPPED | OUTPATIENT
Start: 2024-04-29

## 2024-04-29 NOTE — TELEPHONE ENCOUNTER
PCP: Lorrie Rodriguez MD    Last appt: [unfilled]  Future Appointments   Date Time Provider Department Center   5/30/2024  1:00 PM Lorrie Rodriguez MD [facility] BS AMB       Requested Prescriptions      No prescriptions requested or ordered in this encounter       Prior labs and Blood pressures:  BP Readings from Last 3 Encounters:   02/29/24 120/77   02/01/24 117/75   11/02/23 126/85     Lab Results   Component Value Date/Time     01/23/2024 10:07 AM    K 4.4 01/23/2024 10:07 AM    CL 98 01/23/2024 10:07 AM    CO2 23 01/23/2024 10:07 AM    BUN 10 01/23/2024 10:07 AM    GFRAA >60 10/20/2021 09:04 AM     No results found for: \"HBA1C\", \"[id number]\"  Lab Results   Component Value Date/Time    CHOL 139 08/03/2023 11:40 AM    HDL 55 08/03/2023 11:40 AM    VLDL 30 05/06/2022 12:00 AM     No results found for: \"VITD3\", \"VD3RIA\"    Lab Results   Component Value Date/Time    TSH 1.300 05/20/2021 09:19 AM

## 2024-05-03 ENCOUNTER — TELEPHONE (OUTPATIENT)
Facility: CLINIC | Age: 64
End: 2024-05-03

## 2024-05-03 NOTE — TELEPHONE ENCOUNTER
Patient is unable to get her Trulicity due to it being back ordered. Wants to know if she can be put back on her Januvia to control her sugar

## 2024-05-07 DIAGNOSIS — E11.9 TYPE 2 DIABETES MELLITUS WITHOUT COMPLICATIONS (HCC): ICD-10-CM

## 2024-05-08 ENCOUNTER — TELEPHONE (OUTPATIENT)
Facility: CLINIC | Age: 64
End: 2024-05-08

## 2024-05-08 RX ORDER — SITAGLIPTIN 100 MG/1
100 TABLET, FILM COATED ORAL EVERY MORNING
Qty: 30 TABLET | Refills: 5 | OUTPATIENT
Start: 2024-05-08

## 2024-05-08 NOTE — TELEPHONE ENCOUNTER
Pt is requesting JANUVIA® (sitagliptin) be called in because her Dulaglutide (TRULICITY) 3 MG/0.5ML SOPN is on back order and they don't know when it will be back in stock. Pt states that she has taken the Januvia in the past.      Please advise

## 2024-06-13 ENCOUNTER — HOSPITAL ENCOUNTER (OUTPATIENT)
Facility: HOSPITAL | Age: 64
Discharge: HOME | End: 2024-06-13
Attending: STUDENT IN AN ORGANIZED HEALTH CARE EDUCATION/TRAINING PROGRAM
Payer: MEDICAID

## 2024-06-13 VITALS — WEIGHT: 229 LBS | BODY MASS INDEX: 36.8 KG/M2 | HEIGHT: 66 IN

## 2024-06-13 DIAGNOSIS — Z12.31 SCREENING MAMMOGRAM FOR BREAST CANCER: ICD-10-CM

## 2024-06-13 PROCEDURE — 77063 BREAST TOMOSYNTHESIS BI: CPT

## 2024-06-23 DIAGNOSIS — M79.89 OTHER SPECIFIED SOFT TISSUE DISORDERS: ICD-10-CM

## 2024-06-24 NOTE — PATIENT INSTRUCTIONS
[address] [facility] [place]. [place], [address]  Tel.  [phone]  Fax. [phone] [facility] [address]  [address] [facility] [address], [address]  Tel.  [phone]  Fax. [phone] [address]  Tel.  [phone]  Fax. [phone]     Sleep Apnea: After Your Visit  Your Care Instructions  Sleep apnea occurs when you frequently stop breathing for 10 seconds or longer during sleep. It can be mild to severe, based on the number of times per hour that you stop breathing or have slowed breathing. Blocked or narrowed airways in your nose, mouth, or throat can cause sleep apnea. Your airway can become blocked when your throat muscles and tongue relax during sleep. Sleep apnea is common, occurring in 1 out of 20 individuals. Individuals having any of the following characteristics should be evaluated and treated right away due to high risk and detrimental consequences from untreated sleep apnea:  1. Obesity  2. Congestive Heart failure  3. Atrial Fibrillation  4. Uncontrolled Hypertension  5. Type II Diabetes  6. Night-time Arrhythmias  7. Stroke  8. Pulmonary Hypertension  9. High-risk Driving Populations (pilots, truck drivers, etc.)  10. Patients Considering Weight-loss Surgery    How do you know you have sleep apnea? You probably have sleep apnea if you answer 'yes' to 3 or more of the following questions:  S - Have you been told that you Snore? T - Are you often Tired during the day? O - Has anyone Observed you stop breathing while sleeping? P- Do you have (or are being treated for) high blood Pressure? B - Are you obese (Body Mass Index > 35)? A - Is your Age 48years old or older? N - Is your Neck size greater than 16 inches? G - Are you male Gender? A sleep physician can prescribe a breathing device that prevents tissues in the throat from blocking your airway.  Or your doctor may recommend using a dental device (oral breathing device) to help keep your airway open. In some cases, surgery may be needed to remove enlarged tissues in the throat. Follow-up care is a key part of your treatment and safety. Be sure to make and go to all appointments, and call your doctor if you are having problems. It's also a good idea to know your test results and keep a list of the medicines you take. How can you care for yourself at home? · Lose weight, if needed. It may reduce the number of times you stop breathing or have slowed breathing. · Go to bed at the same time every night. · Sleep on your side. It may stop mild apnea. If you tend to roll onto your back, sew a pocket in the back of your pajama top. Put a tennis ball into the pocket, and stitch the pocket shut. This will help keep you from sleeping on your back. · Avoid alcohol and medicines such as sleeping pills and sedatives before bed. · Do not smoke. Smoking can make sleep apnea worse. If you need help quitting, talk to your doctor about stop-smoking programs and medicines. These can increase your chances of quitting for good. · Prop up the head of your bed 4 to 6 inches by putting bricks under the legs of the bed. · Treat breathing problems, such as a stuffy nose, caused by a cold or allergies. · Use a continuous positive airway pressure (CPAP) breathing machine if lifestyle changes do not help your apnea and your doctor recommends it. The machine keeps your airway from closing when you sleep. · If CPAP does not help you, ask your doctor whether you should try other breathing machines. A bilevel positive airway pressure machine has two types of air pressureâone for breathing in and one for breathing out. Another device raises or lowers air pressure as needed while you breathe. · If your nose feels dry or bleeds when using one of these machines, talk with your doctor about increasing moisture in the air. A humidifier may help.   · If your nose is runny or stuffy from using a breathing machine, talk with your doctor about using decongestants or a corticosteroid nasal spray. When should you call for help? Watch closely for changes in your health, and be sure to contact your doctor if:  · You still have sleep apnea even though you have made lifestyle changes. · You are thinking of trying a device such as CPAP. · You are having problems using a CPAP or similar machine. Where can you learn more? Go to [employer]. Enter S177 in the search box to learn more about \"Sleep Apnea: After Your Visit. \"   © 8281-6137 Healthwise, [employer]. Care instructions adapted under license by [address] [place] [address] [employer] (which disclaims liability or warranty for this information). This care instruction is for use with your licensed healthcare professional. If you have questions about a medical condition or this instruction, always ask your healthcare professional. [place] [employer] any warranty or liability for your use of this information. PROPER SLEEP HYGIENE    What to avoid  · Do not have drinks with caffeine, such as coffee or black tea, for 8 hours before bed. · Do not smoke or use other types of tobacco near bedtime. Nicotine is a stimulant and can keep you awake. · Avoid drinking alcohol late in the evening, because it can cause you to wake in the middle of the night. · Do not eat a big meal close to bedtime. If you are hungry, eat a light snack. · Do not drink a lot of water close to bedtime, because the need to urinate may wake you up during the night. · Do not read or watch TV in bed. Use the bed only for sleeping and sexual activity. What to try  · Go to bed at the same time every night, and wake up at the same time every morning. Do not take naps during the day. · Keep your bedroom quiet, dark, and cool. · Get regular exercise, but not within 3 to 4 hours of your bedtime. .  · Sleep on a comfortable pillow and mattress.   · If watching the clock makes you anxious, turn it facing away from you so you cannot see the time. · If you worry when you lie down, start a worry book. Well before bedtime, write down your worries, and then set the book and your concerns aside. · Try meditation or other relaxation techniques before you go to bed. · If you cannot fall asleep, get up and go to another room until you feel sleepy. Do something relaxing. Repeat your bedtime routine before you go to bed again. · Make your house quiet and calm about an hour before bedtime. Turn down the lights, turn off the TV, log off the computer, and turn down the volume on music. This can help you relax after a busy day. Drowsy Driving  The [address] [place] [address] Road Traffic Safety Administration cites drowsiness as a causing factor in more than 812,882 police reported crashes annually, resulting in 76,000 injuries and 1,500 deaths. Other surveys suggest 55% of people polled have driven while drowsy in the past year, 23% had fallen asleep but not crashed, 3% crashed, and 2% had and accident due to drowsy driving. Who is at risk? Young Drivers: One study of drowsy driving accidents states that 55% of the drivers were under 25 years. Of those, 75% were male. Shift Workers and Travelers: People who work overnight or travel across time zones frequently are at higher risk of experiencing Circadian Rhythm Disorders. They are trying to work and function when their body is programed to sleep. Sleep Deprived: Lack of sleep has a serious impact on your ability to pay attention or focus on a task. Consistently getting less than the average of 8 hours your body needs creates partial or cumulative sleep deprivation. Untreated Sleep Disorders: Sleep Apnea, Narcolepsy, R.L.S., and other sleep disorders (untreated) prevent a person from getting enough restful sleep. This leads to excessive daytime sleepiness and increases the risk for drowsy driving accidents by up to 7 times.   Medications / Alcohol: Even over the counter medications can cause drowsiness. Medications that impair a drivers attention should have a warning label. Alcohol naturally makes you sleepy and on its own can cause accidents. Combined with excessive drowsiness its effects are amplified. Signs of Drowsy Driving:   * You don't remember driving the last few miles   * You may drift out of your sacha   * You are unable to focus and your thoughts wander   * You may yawn more often than normal   * You have difficulty keeping your eyes open / nodding off   * Missing traffic signs, speeding, or tailgating  Prevention-   Good sleep hygiene, lifestyle and behavioral choices have the most impact on drowsy driving. There is no substitute for sleep and the average person requires 8 hours nightly. If you find yourself driving drowsy, stop and sleep. Consider the sleep hygiene tips provided during your visit as well. Medication Refill Policy: Refills for all medications require 1 week advance notice. Please have your pharmacy fax a refill request. We are unable to fax, or call in \"controled substance\" medications and you will need to pick these prescriptions up from our office. [employer] Activation    Thank you for requesting access to [employer]. Please follow the instructions below to securely access and download your online medical record. [employer] allows you to send messages to your doctor, view your test results, renew your prescriptions, schedule appointments, and more. How Do I Sign Up? 1. In your internet browser, go to [email or web address]. 2. Click on the First Time User? Click Here link in the Sign In box. You will see the New Member Sign Up page. 3. Enter your [employer] Access Code exactly as it appears below. You will not need to use this code after youve completed the sign-up process. If you do not sign up before the expiration date, you must request a new code. [employer] Access Code:  Activation code not generated  Current [employer] Status: Active (This is the date your [employer] Authoria access code will )    4. Enter the last four digits of your Social Security Number (xxxx) and Date of Birth (mm/dd/yyyy) as indicated and click Submit. You will be taken to the next sign-up page. 5. Create a [employer] Authoria ID. This will be your [employer] Authoria login ID and cannot be changed, so think of one that is secure and easy to remember. 6. Create a [employer] Authoria password. You can change your password at any time. 7. Enter your Password Reset Question and Answer. This can be used at a later time if you forget your password. 8. Enter your e-mail address. You will receive e-mail notification when new information is available in [address] 9. Click Sign Up. You can now view and download portions of your medical record. 10. Click the Download Summary menu link to download a portable copy of your medical information. Additional Information    If you have questions, please call [phone]. Remember, [employer] Authoria is NOT to be used for urgent needs. For medical emergencies, dial 911.

## 2024-06-25 RX ORDER — FUROSEMIDE 20 MG/1
20 TABLET ORAL DAILY
Qty: 30 TABLET | Refills: 0 | Status: SHIPPED | OUTPATIENT
Start: 2024-06-25

## 2024-07-02 DIAGNOSIS — I10 ESSENTIAL (PRIMARY) HYPERTENSION: ICD-10-CM

## 2024-07-02 RX ORDER — LOSARTAN POTASSIUM 50 MG/1
50 TABLET ORAL DAILY
Qty: 90 TABLET | Refills: 0 | Status: SHIPPED | OUTPATIENT
Start: 2024-07-02

## 2024-07-12 DIAGNOSIS — K21.9 GASTRO-ESOPHAGEAL REFLUX DISEASE WITHOUT ESOPHAGITIS: ICD-10-CM

## 2024-07-12 RX ORDER — OMEPRAZOLE 20 MG/1
20 CAPSULE, DELAYED RELEASE ORAL DAILY
Qty: 90 CAPSULE | Refills: 0 | Status: SHIPPED | OUTPATIENT
Start: 2024-07-12

## 2024-07-12 NOTE — TELEPHONE ENCOUNTER
Patient called need refill for Omeprazole. Please send to [facility] on [address]

## 2024-07-17 DIAGNOSIS — M79.89 OTHER SPECIFIED SOFT TISSUE DISORDERS: ICD-10-CM

## 2024-07-17 RX ORDER — FUROSEMIDE 20 MG/1
20 TABLET ORAL DAILY
Qty: 30 TABLET | Refills: 0 | Status: SHIPPED | OUTPATIENT
Start: 2024-07-17

## 2024-07-28 DIAGNOSIS — E11.65 TYPE 2 DIABETES MELLITUS WITH HYPERGLYCEMIA, WITHOUT LONG-TERM CURRENT USE OF INSULIN (HCC): ICD-10-CM

## 2024-07-29 DIAGNOSIS — K21.9 GASTRO-ESOPHAGEAL REFLUX DISEASE WITHOUT ESOPHAGITIS: ICD-10-CM

## 2024-07-31 DIAGNOSIS — E11.65 TYPE 2 DIABETES MELLITUS WITH HYPERGLYCEMIA, WITHOUT LONG-TERM CURRENT USE OF INSULIN (HCC): ICD-10-CM

## 2024-08-01 ENCOUNTER — OFFICE VISIT (OUTPATIENT)
Facility: CLINIC | Age: 64
End: 2024-08-01
Payer: MEDICAID

## 2024-08-01 VITALS
DIASTOLIC BLOOD PRESSURE: 75 MMHG | HEIGHT: 66 IN | BODY MASS INDEX: 36.42 KG/M2 | HEART RATE: 80 BPM | WEIGHT: 226.6 LBS | TEMPERATURE: 97.8 F | OXYGEN SATURATION: 98 % | SYSTOLIC BLOOD PRESSURE: 122 MMHG

## 2024-08-01 DIAGNOSIS — J45.30 MILD PERSISTENT ASTHMA WITHOUT COMPLICATION: ICD-10-CM

## 2024-08-01 DIAGNOSIS — K21.9 GASTROESOPHAGEAL REFLUX DISEASE, UNSPECIFIED WHETHER ESOPHAGITIS PRESENT: ICD-10-CM

## 2024-08-01 DIAGNOSIS — R60.0 BILATERAL LEG EDEMA: ICD-10-CM

## 2024-08-01 DIAGNOSIS — E11.65 TYPE 2 DIABETES MELLITUS WITH HYPERGLYCEMIA, WITHOUT LONG-TERM CURRENT USE OF INSULIN (HCC): Primary | ICD-10-CM

## 2024-08-01 DIAGNOSIS — E78.2 MIXED HYPERLIPIDEMIA: ICD-10-CM

## 2024-08-01 DIAGNOSIS — I10 ESSENTIAL (PRIMARY) HYPERTENSION: ICD-10-CM

## 2024-08-01 DIAGNOSIS — M79.89 OTHER SPECIFIED SOFT TISSUE DISORDERS: ICD-10-CM

## 2024-08-01 PROBLEM — H40.89 OTHER SPECIFIED GLAUCOMA: Status: ACTIVE | Noted: 2024-08-01

## 2024-08-01 LAB — HBA1C MFR BLD: 9.4 %

## 2024-08-01 PROCEDURE — 3074F SYST BP LT 130 MM HG: CPT | Performed by: STUDENT IN AN ORGANIZED HEALTH CARE EDUCATION/TRAINING PROGRAM

## 2024-08-01 PROCEDURE — 3078F DIAST BP <80 MM HG: CPT | Performed by: STUDENT IN AN ORGANIZED HEALTH CARE EDUCATION/TRAINING PROGRAM

## 2024-08-01 PROCEDURE — 3046F HEMOGLOBIN A1C LEVEL >9.0%: CPT | Performed by: STUDENT IN AN ORGANIZED HEALTH CARE EDUCATION/TRAINING PROGRAM

## 2024-08-01 PROCEDURE — 83036 HEMOGLOBIN GLYCOSYLATED A1C: CPT | Performed by: STUDENT IN AN ORGANIZED HEALTH CARE EDUCATION/TRAINING PROGRAM

## 2024-08-01 PROCEDURE — 99214 OFFICE O/P EST MOD 30 MIN: CPT | Performed by: STUDENT IN AN ORGANIZED HEALTH CARE EDUCATION/TRAINING PROGRAM

## 2024-08-01 RX ORDER — GLIPIZIDE 10 MG/1
10 TABLET ORAL 2 TIMES DAILY
Qty: 180 TABLET | Refills: 1 | Status: SHIPPED | OUTPATIENT
Start: 2024-08-01

## 2024-08-01 RX ORDER — FUROSEMIDE 20 MG/1
20 TABLET ORAL DAILY
Qty: 90 TABLET | Refills: 1 | Status: SHIPPED | OUTPATIENT
Start: 2024-08-01

## 2024-08-01 RX ORDER — DULAGLUTIDE 4.5 MG/.5ML
4.5 INJECTION, SOLUTION SUBCUTANEOUS WEEKLY
Qty: 4 ADJUSTABLE DOSE PRE-FILLED PEN SYRINGE | Refills: 3 | Status: SHIPPED | OUTPATIENT
Start: 2024-08-01

## 2024-08-01 RX ORDER — GLIPIZIDE 10 MG/1
10 TABLET ORAL 2 TIMES DAILY
Qty: 180 TABLET | Refills: 0 | Status: SHIPPED | OUTPATIENT
Start: 2024-08-01 | End: 2024-08-01 | Stop reason: SDUPTHER

## 2024-08-01 RX ORDER — BLOOD-GLUCOSE SENSOR
EACH MISCELLANEOUS
Qty: 6 EACH | Refills: 1 | Status: SHIPPED | OUTPATIENT
Start: 2024-08-01

## 2024-08-01 RX ORDER — METFORMIN HYDROCHLORIDE 500 MG/1
1000 TABLET, EXTENDED RELEASE ORAL
Qty: 180 TABLET | Refills: 0 | Status: SHIPPED | OUTPATIENT
Start: 2024-08-01

## 2024-08-01 RX ORDER — DULAGLUTIDE 3 MG/.5ML
3 INJECTION, SOLUTION SUBCUTANEOUS
Qty: 4 ADJUSTABLE DOSE PRE-FILLED PEN SYRINGE | Refills: 2 | Status: SHIPPED | OUTPATIENT
Start: 2024-08-01 | End: 2024-08-01 | Stop reason: DRUGHIGH

## 2024-08-01 RX ORDER — ZINC GLUCONATE 50 MG
50 TABLET ORAL DAILY
COMMUNITY

## 2024-08-01 ASSESSMENT — PATIENT HEALTH QUESTIONNAIRE - PHQ9
SUM OF ALL RESPONSES TO PHQ QUESTIONS 1-9: 0
1. LITTLE INTEREST OR PLEASURE IN DOING THINGS: NOT AT ALL
SUM OF ALL RESPONSES TO PHQ QUESTIONS 1-9: 0
2. FEELING DOWN, DEPRESSED OR HOPELESS: NOT AT ALL
SUM OF ALL RESPONSES TO PHQ9 QUESTIONS 1 & 2: 0
SUM OF ALL RESPONSES TO PHQ QUESTIONS 1-9: 0
SUM OF ALL RESPONSES TO PHQ QUESTIONS 1-9: 0

## 2024-08-01 ASSESSMENT — ENCOUNTER SYMPTOMS
ABDOMINAL PAIN: 0
VOMITING: 0
RHINORRHEA: 0
NAUSEA: 0
COUGH: 0
SHORTNESS OF BREATH: 0

## 2024-08-01 NOTE — PROGRESS NOTES
Identified pt with two pt identifiers(name and ). Reviewed record in preparation for visit and have obtained necessary documentation. All patient medications has been reviewed.  Chief Complaint   Patient presents with    Medication Refill     Patient would like to request some medication refills as well as getting some labs done. Patent states that she is having a hard time getting one of her medications due to being out of stock.       Vitals:    24 0947   BP: 122/75   Pulse: 80   Temp: 97.8 °F (36.6 °C)   SpO2: 98%                   Coordination of Care Questionnaire:   1) Have you been to an emergency room, urgent care, or hospitalized since your last visit?   Yes =, for upper respiratory       2. Have seen or consulted any other health care provider since your last visit? NO        Patient is accompanied by Self I have received verbal consent from Cass Waller to discuss any/all medical information while they are present in the room.  
intolerance (impaired glucose tolerance)     Menopause 2018      Past Surgical History:   Procedure Laterality Date    CARPAL TUNNEL RELEASE Bilateral      SECTION      CHOLECYSTECTOMY, LAPAROSCOPIC      ESOPHAGEAL MOTILITY STUDY N/A 2023    ESOPHAGEAL MOTILITY/MANOMETRY STUDY with impedence performed by Sravanthi Valera MD at [facility] ENDOSCOPY    HYSTERECTOMY (CERVIX STATUS UNKNOWN)  2018    ORTHOPEDIC SURGERY Left 2015    SPUR REMOVED TO LEFT FOOT    ORTHOPEDIC SURGERY Left     SPIN AND SCREW IN LEFT ARM    OVARY REMOVAL Bilateral 2018      Family History   Problem Relation Age of Onset    Anesth Problems Neg Hx     Stroke Mother     Diabetes Mother     Diabetes Sister     Other Mother         BRAIN ANURYSM     Hypertension Father     Alzheimer's Disease Maternal Grandmother     Diabetes Father     Kidney Disease Father       Social History     Socioeconomic History    Marital status:      Spouse name: Not on file    Number of children: Not on file    Years of education: Not on file    Highest education level: Not on file   Occupational History    Not on file   Tobacco Use    Smoking status: Former     Current packs/day: 0.00     Types: Cigarettes     Quit date: 1/3/2000     Years since quittin.5    Smokeless tobacco: Never   Vaping Use    Vaping Use: Never used   Substance and Sexual Activity    Alcohol use: Yes     Alcohol/week: 1.0 standard drink of alcohol    Drug use: No    Sexual activity: Not on file   Other Topics Concern    Not on file   Social History Narrative    Not on file     Social Determinants of Health     Financial Resource Strain: Patient Declined (8/3/2023)    Overall Financial Resource Strain (CARDIA)     Difficulty of Paying Living Expenses: Patient declined   Food Insecurity: Not on file (8/3/2023)   Transportation Needs: Unknown (8/3/2023)    PRAPARE - Transportation     Lack of Transportation (Medical): Not on file     Lack of Transportation

## 2024-08-02 RX ORDER — DULAGLUTIDE 3 MG/.5ML
INJECTION, SOLUTION SUBCUTANEOUS
Refills: 1 | OUTPATIENT
Start: 2024-08-02

## 2024-08-05 ENCOUNTER — TELEPHONE (OUTPATIENT)
Facility: CLINIC | Age: 64
End: 2024-08-05

## 2024-08-05 NOTE — TELEPHONE ENCOUNTER
Patient states she needs PA for the [facility] patient states provider  needs to call [phone] patient stets this is the number to get the authorization.

## 2024-08-06 ENCOUNTER — TELEPHONE (OUTPATIENT)
Facility: CLINIC | Age: 64
End: 2024-08-06

## 2024-08-06 DIAGNOSIS — E11.65 TYPE 2 DIABETES MELLITUS WITH HYPERGLYCEMIA, WITHOUT LONG-TERM CURRENT USE OF INSULIN (HCC): ICD-10-CM

## 2024-08-06 RX ORDER — BLOOD-GLUCOSE SENSOR
EACH MISCELLANEOUS
Qty: 6 EACH | Refills: 1 | Status: SHIPPED | OUTPATIENT
Start: 2024-08-06

## 2024-08-06 NOTE — TELEPHONE ENCOUNTER
I spoke to Ms. Waller to let her know that Dr. Rodriguez will type a script and I will fax to a DME company instead of submitting through Covermymeds for the [employer] 3 Sensor.  DME will get the authorization.  They will let us know if approved or denied within 3 or 4 days.

## 2024-08-06 NOTE — TELEPHONE ENCOUNTER
Please disregard message below, was done in error  Encounter Date: 8/5/2024     Signed         Patient notified that I will start on PA for CT Lung order.  Told her that it can take up 3 days to get an approval or denial.

## 2024-08-06 NOTE — TELEPHONE ENCOUNTER
Dr. Rodriguez,    Please type a script for the Continuous Glucose Sensor  (Freestyle Dixon 3 Sensor) with the directions & CPT code, to send to a DME supplier.    Thank you,  lyly

## 2024-08-06 NOTE — TELEPHONE ENCOUNTER
Patient notified that I will start on PA for CT Lung order.  Told her that it can take up 3 days to get an approval or denial.

## 2024-08-06 NOTE — TELEPHONE ENCOUNTER
Patient has lost her One Touch Meter in a store.  Is asking for a new one.  Tests 4x a day.     [facility]

## 2024-08-06 NOTE — TELEPHONE ENCOUNTER
Faxed FreeStyle Dixon 3 Sensor script with demographics, notes, A1C results to [employer] [facility] [employer] (DME) company.  F     Ms. Sherwoodam was notified.

## 2024-08-09 DIAGNOSIS — E78.2 MIXED HYPERLIPIDEMIA: ICD-10-CM

## 2024-08-09 RX ORDER — ROSUVASTATIN CALCIUM 5 MG/1
5 TABLET, COATED ORAL NIGHTLY
Qty: 90 TABLET | Refills: 0 | Status: SHIPPED | OUTPATIENT
Start: 2024-08-09

## 2024-08-09 RX ORDER — EZETIMIBE 10 MG/1
10 TABLET ORAL DAILY
Qty: 90 TABLET | Refills: 0 | Status: SHIPPED | OUTPATIENT
Start: 2024-08-09

## 2024-08-14 ENCOUNTER — TELEPHONE (OUTPATIENT)
Facility: CLINIC | Age: 64
End: 2024-08-14

## 2024-08-14 NOTE — TELEPHONE ENCOUNTER
Patient requesting updated referral to [facility] for PT  Address     [address] [address]   Phone # [phone]  Fax# [phone]

## 2024-08-19 ENCOUNTER — TELEPHONE (OUTPATIENT)
Facility: CLINIC | Age: 64
End: 2024-08-19

## 2024-08-19 DIAGNOSIS — E11.65 TYPE 2 DIABETES MELLITUS WITH HYPERGLYCEMIA, WITHOUT LONG-TERM CURRENT USE OF INSULIN (HCC): Primary | ICD-10-CM

## 2024-08-19 RX ORDER — BLOOD-GLUCOSE METER
EACH MISCELLANEOUS
Qty: 1 KIT | Refills: 0 | Status: SHIPPED | OUTPATIENT
Start: 2024-08-19

## 2024-08-20 ENCOUNTER — TELEPHONE (OUTPATIENT)
Facility: CLINIC | Age: 64
End: 2024-08-20

## 2024-08-20 DIAGNOSIS — E11.65 TYPE 2 DIABETES MELLITUS WITH HYPERGLYCEMIA, WITHOUT LONG-TERM CURRENT USE OF INSULIN (HCC): Primary | ICD-10-CM

## 2024-08-20 RX ORDER — BLOOD-GLUCOSE METER
KIT MISCELLANEOUS
Qty: 1 KIT | Refills: 0 | Status: SHIPPED | OUTPATIENT
Start: 2024-08-20

## 2024-08-20 NOTE — TELEPHONE ENCOUNTER
Patient states the wrong meter was sent to the pharmacy,please send new script for the One touch Beth Reflect meter.

## 2024-09-04 ENCOUNTER — TELEPHONE (OUTPATIENT)
Facility: CLINIC | Age: 64
End: 2024-09-04

## 2024-09-04 NOTE — TELEPHONE ENCOUNTER
Ms. Waller is asking for a referral to PT for her back.  Dr. Gordo Austin has been seeing her in the past for her back.  Should he refer her?

## 2024-09-04 NOTE — TELEPHONE ENCOUNTER
Denied Dexcom & Dixon supplies   [facility].  Ms. Sherwoodam stated that Dr. Rodriguez has already prescribed an alternative.

## 2024-09-15 DIAGNOSIS — K21.9 GASTRO-ESOPHAGEAL REFLUX DISEASE WITHOUT ESOPHAGITIS: ICD-10-CM

## 2024-09-30 DIAGNOSIS — I10 ESSENTIAL (PRIMARY) HYPERTENSION: ICD-10-CM

## 2024-10-02 RX ORDER — LOSARTAN POTASSIUM 50 MG/1
50 TABLET ORAL DAILY
Qty: 90 TABLET | Refills: 0 | Status: SHIPPED | OUTPATIENT
Start: 2024-10-02

## 2024-10-13 DIAGNOSIS — E11.65 TYPE 2 DIABETES MELLITUS WITH HYPERGLYCEMIA, WITHOUT LONG-TERM CURRENT USE OF INSULIN (HCC): ICD-10-CM

## 2024-10-15 RX ORDER — METFORMIN HCL 500 MG
1000 TABLET, EXTENDED RELEASE 24 HR ORAL
Qty: 60 TABLET | Refills: 0 | Status: SHIPPED | OUTPATIENT
Start: 2024-10-15

## 2024-11-07 ENCOUNTER — OFFICE VISIT (OUTPATIENT)
Facility: CLINIC | Age: 64
End: 2024-11-07
Payer: MEDICAID

## 2024-11-07 VITALS
BODY MASS INDEX: 36.38 KG/M2 | HEART RATE: 83 BPM | DIASTOLIC BLOOD PRESSURE: 73 MMHG | HEIGHT: 66 IN | SYSTOLIC BLOOD PRESSURE: 114 MMHG | TEMPERATURE: 97.2 F | WEIGHT: 226.4 LBS | RESPIRATION RATE: 20 BRPM | OXYGEN SATURATION: 96 %

## 2024-11-07 DIAGNOSIS — E78.2 MIXED HYPERLIPIDEMIA: ICD-10-CM

## 2024-11-07 DIAGNOSIS — E11.65 TYPE 2 DIABETES MELLITUS WITH HYPERGLYCEMIA, WITHOUT LONG-TERM CURRENT USE OF INSULIN (HCC): Primary | ICD-10-CM

## 2024-11-07 LAB — HBA1C MFR BLD: 7.9 %

## 2024-11-07 PROCEDURE — 99214 OFFICE O/P EST MOD 30 MIN: CPT | Performed by: NURSE PRACTITIONER

## 2024-11-07 PROCEDURE — 3046F HEMOGLOBIN A1C LEVEL >9.0%: CPT | Performed by: NURSE PRACTITIONER

## 2024-11-07 PROCEDURE — 3074F SYST BP LT 130 MM HG: CPT | Performed by: NURSE PRACTITIONER

## 2024-11-07 PROCEDURE — 83036 HEMOGLOBIN GLYCOSYLATED A1C: CPT | Performed by: NURSE PRACTITIONER

## 2024-11-07 PROCEDURE — 3078F DIAST BP <80 MM HG: CPT | Performed by: NURSE PRACTITIONER

## 2024-11-07 SDOH — ECONOMIC STABILITY: FOOD INSECURITY: WITHIN THE PAST 12 MONTHS, YOU WORRIED THAT YOUR FOOD WOULD RUN OUT BEFORE YOU GOT MONEY TO BUY MORE.: NEVER TRUE

## 2024-11-07 SDOH — ECONOMIC STABILITY: INCOME INSECURITY: HOW HARD IS IT FOR YOU TO PAY FOR THE VERY BASICS LIKE FOOD, HOUSING, MEDICAL CARE, AND HEATING?: NOT HARD AT ALL

## 2024-11-07 SDOH — ECONOMIC STABILITY: FOOD INSECURITY: WITHIN THE PAST 12 MONTHS, THE FOOD YOU BOUGHT JUST DIDN'T LAST AND YOU DIDN'T HAVE MONEY TO GET MORE.: NEVER TRUE

## 2024-11-07 NOTE — PROGRESS NOTES
History of present illness:Cass Waller is a 64 y.o. female presenting for type 2 diabetes medication.    Ms. Waller reports nausea and stomach pain when taking metformin. To make herself feel better, she would eat more food.  She would like to discontinue metformin and restart Januvia which she was previously prescribed.  It was discontinued because she also takes Trulicity. Her home Fasting glucose averages: 150-200's.      Review of Systems   Constitutional:  Negative for chills and fever.   Respiratory:  Negative for chest tightness.    Cardiovascular:  Negative for chest pain.   Gastrointestinal:  Positive for abdominal pain and nausea.   Endocrine: Positive for polyphagia.           Past Medical History:   Diagnosis Date    Adverse effect of anesthesia     WITHIN 20-30 MINUTES AFTER WAKING UP FROM CARPAL TUNNEL SURGERY, HAD DIFFICULTY BREATHING ,     Anemia     Anxiety     Glucose intolerance (impaired glucose tolerance)     Menopause 2018     Past Surgical History:   Procedure Laterality Date    CARPAL TUNNEL RELEASE Bilateral      SECTION      CHOLECYSTECTOMY, LAPAROSCOPIC      ESOPHAGEAL MOTILITY STUDY N/A 2023    ESOPHAGEAL MOTILITY/MANOMETRY STUDY with impedence performed by Sravanthi Valera MD at \[facility]"" ENDOSCOPY    HYSTERECTOMY (CERVIX STATUS UNKNOWN)  2018    ORTHOPEDIC SURGERY Left 2015    SPUR REMOVED TO LEFT FOOT    ORTHOPEDIC SURGERY Left     SPIN AND SCREW IN LEFT ARM    OVARY REMOVAL Bilateral 2018     Family History   Problem Relation Age of Onset    Anesth Problems Neg Hx     Stroke Mother     Diabetes Mother     Diabetes Sister     Other Mother         BRAIN ANURYSM     Hypertension Father     Alzheimer's Disease Maternal Grandmother     Diabetes Father     Kidney Disease Father        Prior to Admission medications    Medication Sig Start Date End Date Taking? Authorizing Provider   empagliflozin (JARDIANCE) 10 MG tablet Take 1 tablet by mouth daily 24  Yes

## 2024-11-08 RX ORDER — ROSUVASTATIN CALCIUM 5 MG/1
5 TABLET, COATED ORAL NIGHTLY
Qty: 90 TABLET | Refills: 0 | OUTPATIENT
Start: 2024-11-08

## 2024-11-08 RX ORDER — EZETIMIBE 10 MG/1
10 TABLET ORAL DAILY
Qty: 90 TABLET | Refills: 0 | OUTPATIENT
Start: 2024-11-08

## 2024-11-11 ENCOUNTER — TELEPHONE (OUTPATIENT)
Facility: CLINIC | Age: 64
End: 2024-11-11

## 2024-11-11 NOTE — TELEPHONE ENCOUNTER
Dr. Rodriguez,    T/C from Ms. Waller this morning.  She was coming in for her A1C labs but B.S. dropped to 72.  She ate to bring it back up to 160.  She is asking what should she do if she tries to get labs done and this happens again?    I do not see a new lab order for A1C.

## 2024-11-12 ASSESSMENT — ENCOUNTER SYMPTOMS
ABDOMINAL PAIN: 1
CHEST TIGHTNESS: 0
NAUSEA: 1

## 2024-11-16 DIAGNOSIS — E11.65 TYPE 2 DIABETES MELLITUS WITH HYPERGLYCEMIA, WITHOUT LONG-TERM CURRENT USE OF INSULIN (HCC): ICD-10-CM

## 2024-11-19 RX ORDER — DULAGLUTIDE 4.5 MG/.5ML
INJECTION, SOLUTION SUBCUTANEOUS
Qty: 2 ML | Refills: 3 | Status: SHIPPED | OUTPATIENT
Start: 2024-11-19

## 2024-12-30 DIAGNOSIS — I10 ESSENTIAL (PRIMARY) HYPERTENSION: ICD-10-CM

## 2024-12-30 RX ORDER — LOSARTAN POTASSIUM 50 MG/1
50 TABLET ORAL DAILY
Qty: 90 TABLET | Refills: 0 | OUTPATIENT
Start: 2024-12-30

## 2025-01-12 DIAGNOSIS — I10 ESSENTIAL (PRIMARY) HYPERTENSION: ICD-10-CM

## 2025-01-12 DIAGNOSIS — E78.2 MIXED HYPERLIPIDEMIA: ICD-10-CM

## 2025-01-14 RX ORDER — ROSUVASTATIN CALCIUM 5 MG/1
5 TABLET, COATED ORAL NIGHTLY
Qty: 90 TABLET | Refills: 1 | Status: SHIPPED | OUTPATIENT
Start: 2025-01-14

## 2025-01-14 RX ORDER — EZETIMIBE 10 MG/1
10 TABLET ORAL DAILY
Qty: 90 TABLET | Refills: 1 | Status: SHIPPED | OUTPATIENT
Start: 2025-01-14

## 2025-01-14 RX ORDER — LOSARTAN POTASSIUM 50 MG/1
50 TABLET ORAL DAILY
Qty: 90 TABLET | Refills: 0 | Status: SHIPPED | OUTPATIENT
Start: 2025-01-14

## 2025-02-08 DIAGNOSIS — E11.65 TYPE 2 DIABETES MELLITUS WITH HYPERGLYCEMIA, WITHOUT LONG-TERM CURRENT USE OF INSULIN (HCC): ICD-10-CM

## 2025-02-10 DIAGNOSIS — M79.89 OTHER SPECIFIED SOFT TISSUE DISORDERS: ICD-10-CM

## 2025-02-11 DIAGNOSIS — E11.65 TYPE 2 DIABETES MELLITUS WITH HYPERGLYCEMIA, WITHOUT LONG-TERM CURRENT USE OF INSULIN (HCC): ICD-10-CM

## 2025-02-11 DIAGNOSIS — I10 ESSENTIAL (PRIMARY) HYPERTENSION: ICD-10-CM

## 2025-02-11 RX ORDER — GLIPIZIDE 10 MG/1
10 TABLET ORAL 2 TIMES DAILY
Qty: 180 TABLET | Refills: 0 | Status: SHIPPED | OUTPATIENT
Start: 2025-02-11

## 2025-02-11 RX ORDER — FUROSEMIDE 20 MG/1
20 TABLET ORAL DAILY
Qty: 90 TABLET | Refills: 0 | Status: SHIPPED | OUTPATIENT
Start: 2025-02-11

## 2025-02-12 RX ORDER — DULAGLUTIDE 3 MG/.5ML
INJECTION, SOLUTION SUBCUTANEOUS
Refills: 2 | OUTPATIENT
Start: 2025-02-12

## 2025-02-12 RX ORDER — LOSARTAN POTASSIUM 50 MG/1
50 TABLET ORAL DAILY
Qty: 90 TABLET | Refills: 0 | OUTPATIENT
Start: 2025-02-12

## 2025-02-12 RX ORDER — GLIPIZIDE 10 MG/1
10 TABLET ORAL 2 TIMES DAILY
Qty: 180 TABLET | Refills: 0 | OUTPATIENT
Start: 2025-02-12

## 2025-02-28 DIAGNOSIS — I10 ESSENTIAL (PRIMARY) HYPERTENSION: ICD-10-CM

## 2025-03-05 RX ORDER — LOSARTAN POTASSIUM 50 MG/1
50 TABLET ORAL DAILY
Qty: 90 TABLET | Refills: 0 | OUTPATIENT
Start: 2025-03-05

## 2025-03-10 ENCOUNTER — OFFICE VISIT (OUTPATIENT)
Facility: CLINIC | Age: 65
End: 2025-03-10
Payer: MEDICAID

## 2025-03-10 VITALS
HEIGHT: 66 IN | DIASTOLIC BLOOD PRESSURE: 68 MMHG | TEMPERATURE: 97.8 F | BODY MASS INDEX: 35.6 KG/M2 | HEART RATE: 81 BPM | SYSTOLIC BLOOD PRESSURE: 106 MMHG | RESPIRATION RATE: 20 BRPM | OXYGEN SATURATION: 96 % | WEIGHT: 221.5 LBS

## 2025-03-10 DIAGNOSIS — I10 ESSENTIAL (PRIMARY) HYPERTENSION: ICD-10-CM

## 2025-03-10 DIAGNOSIS — E11.65 TYPE 2 DIABETES MELLITUS WITH HYPERGLYCEMIA, WITHOUT LONG-TERM CURRENT USE OF INSULIN (HCC): Primary | ICD-10-CM

## 2025-03-10 DIAGNOSIS — K21.9 GASTROESOPHAGEAL REFLUX DISEASE, UNSPECIFIED WHETHER ESOPHAGITIS PRESENT: ICD-10-CM

## 2025-03-10 DIAGNOSIS — E66.01 SEVERE OBESITY: ICD-10-CM

## 2025-03-10 PROCEDURE — 99214 OFFICE O/P EST MOD 30 MIN: CPT | Performed by: STUDENT IN AN ORGANIZED HEALTH CARE EDUCATION/TRAINING PROGRAM

## 2025-03-10 PROCEDURE — 3074F SYST BP LT 130 MM HG: CPT | Performed by: STUDENT IN AN ORGANIZED HEALTH CARE EDUCATION/TRAINING PROGRAM

## 2025-03-10 PROCEDURE — 3078F DIAST BP <80 MM HG: CPT | Performed by: STUDENT IN AN ORGANIZED HEALTH CARE EDUCATION/TRAINING PROGRAM

## 2025-03-10 RX ORDER — FAMOTIDINE 20 MG/1
20 TABLET, FILM COATED ORAL
COMMUNITY
Start: 2024-12-07 | End: 2025-03-10

## 2025-03-10 RX ORDER — PANTOPRAZOLE SODIUM 40 MG/1
40 TABLET, DELAYED RELEASE ORAL
Qty: 90 TABLET | Refills: 0 | Status: SHIPPED | OUTPATIENT
Start: 2025-03-10

## 2025-03-10 RX ORDER — CICLOPIROX 80 MG/ML
SOLUTION TOPICAL NIGHTLY
COMMUNITY
Start: 2025-02-13

## 2025-03-10 SDOH — ECONOMIC STABILITY: FOOD INSECURITY: WITHIN THE PAST 12 MONTHS, YOU WORRIED THAT YOUR FOOD WOULD RUN OUT BEFORE YOU GOT MONEY TO BUY MORE.: NEVER TRUE

## 2025-03-10 SDOH — ECONOMIC STABILITY: FOOD INSECURITY: WITHIN THE PAST 12 MONTHS, THE FOOD YOU BOUGHT JUST DIDN'T LAST AND YOU DIDN'T HAVE MONEY TO GET MORE.: NEVER TRUE

## 2025-03-10 ASSESSMENT — ENCOUNTER SYMPTOMS
NAUSEA: 0
SHORTNESS OF BREATH: 0
VOMITING: 0
RHINORRHEA: 0
COUGH: 0
ABDOMINAL PAIN: 0

## 2025-03-10 ASSESSMENT — PATIENT HEALTH QUESTIONNAIRE - PHQ9
SUM OF ALL RESPONSES TO PHQ QUESTIONS 1-9: 0
2. FEELING DOWN, DEPRESSED OR HOPELESS: NOT AT ALL
SUM OF ALL RESPONSES TO PHQ QUESTIONS 1-9: 0
1. LITTLE INTEREST OR PLEASURE IN DOING THINGS: NOT AT ALL

## 2025-03-10 NOTE — PROGRESS NOTES
Assessment/Plan:     Diagnoses and all orders for this visit:    Type 2 diabetes mellitus with hyperglycemia, without long-term current use of insulin (HCC)  -     Comprehensive Metabolic Panel; Future  -     Hemoglobin A1C; Future  -     CBC; Future  -Chronic.  Presumed stable.  -Check routine lab work including hemoglobin A1c  -Continue current regimen of glipizide, Trulicity and Jardiance.  -Continue diabetic diet  -Continue routine diabetic eye exams    Essential (primary) hypertension  -     Comprehensive Metabolic Panel; Future  -     CBC; Future  -Chronic.  Stable.  -Continue losartan 50 mg daily    Gastroesophageal reflux disease, unspecified whether esophagitis present  -     pantoprazole (PROTONIX) 40 MG tablet; Take 1 tablet by mouth every morning (before breakfast)  -     External Referral To Gastroenterology  -Chronic.  Uncontrolled.  -Will switch from omeprazole to pantoprazole daily  -Avoid foods that trigger symptoms  -Also given her history of chronic dysphagia recommend reestablishing with gastroenterology  -Referral placed today    Severe obesity  -     [facility] - Kindra Meneses MD, Bariatrics, [place]  -Patient has been working on dietary and lifestyle modifications to promote weight loss  -However she is also looking for additional assistance with possible medication management.  -Refer to weight loss medicine       Please note that this dictation was completed with [employer]Suite, the computer voice recognition software. Quite often unanticipated grammatical, syntax, homophones, and other interpretive errors are inadvertently transcribed by the computer software. Please disregard these errors. Please excuse any errors that have escaped final proofreading.    Return in about 3 months (around 6/10/2025).     Discussed expected course/resolution/complications of diagnosis in detail with patient.    Medication risks/benefits/costs/interactions/alternatives discussed with patient.    Pt expressed

## 2025-03-10 NOTE — PROGRESS NOTES
dentified pt with two pt identifiers(name and ).    Chief Complaint   Patient presents with    Follow-Up from Hospital     Patient here for ED follow up for URI.        Health Maintenance Due   Topic    HIV screen     Diabetic retinal exam     Diabetic foot exam        Wt Readings from Last 3 Encounters:   03/10/25 100.5 kg (221 lb 8 oz)   24 102.7 kg (226 lb 6.4 oz)   24 102.8 kg (226 lb 9.6 oz)     Temp Readings from Last 3 Encounters:   24 97.2 °F (36.2 °C) (Temporal)   24 97.8 °F (36.6 °C)     BP Readings from Last 3 Encounters:   24 114/73   24 122/75   24 120/77     Pulse Readings from Last 3 Encounters:   24 83   24 80   24 78           Coordination of Care Questionnaire:  :   1. \"Have you been to the ER, urgent care clinic since your last visit?  Hospitalized since your last visit?\" Yes   ODIL[facility] [place]     2. \"Have you seen or consulted any other health care providers outside of the [facility] since your last visit?\" yes     3. For patients aged 45-75: Has the patient had a colonoscopy / FIT/ Cologuard? no      If the patient is female:    4. For patients aged 40-74: Has the patient had a mammogram within the past 2 years? no      5. For patients aged 21-65: Has the patient had a pap smear? no     3) Do you have an Advance Directive on file? no  Are you interested in receiving information about Advance Directives? no    Patient is accompanied by self I have received verbal consent from Cass Waller to discuss any/all medical information while they are present in the room.

## 2025-03-14 DIAGNOSIS — K21.9 GASTRO-ESOPHAGEAL REFLUX DISEASE WITHOUT ESOPHAGITIS: ICD-10-CM

## 2025-03-14 RX ORDER — OMEPRAZOLE 20 MG/1
CAPSULE, DELAYED RELEASE ORAL DAILY
Qty: 90 CAPSULE | Refills: 1 | Status: SHIPPED | OUTPATIENT
Start: 2025-03-14

## 2025-03-16 DIAGNOSIS — E11.65 TYPE 2 DIABETES MELLITUS WITH HYPERGLYCEMIA, WITHOUT LONG-TERM CURRENT USE OF INSULIN (HCC): ICD-10-CM

## 2025-03-17 ENCOUNTER — TELEPHONE (OUTPATIENT)
Facility: CLINIC | Age: 65
End: 2025-03-17

## 2025-03-17 RX ORDER — DULAGLUTIDE 4.5 MG/.5ML
INJECTION, SOLUTION SUBCUTANEOUS
Qty: 2 ML | Refills: 3 | Status: SHIPPED | OUTPATIENT
Start: 2025-03-17

## 2025-03-17 NOTE — TELEPHONE ENCOUNTER
Pt stateted that her Rx of trulicity was not rewed ay her last visit. She is a day behind. Can she gat a refill  If approved senr Rx to [facility]

## 2025-03-19 ENCOUNTER — TELEPHONE (OUTPATIENT)
Age: 65
End: 2025-03-19

## 2025-03-19 NOTE — TELEPHONE ENCOUNTER
Patient completed [facility] orientation and [facility] new patient paperwork; contacted patient to schedule new patient consultation; no answer; left voicemail

## 2025-04-08 DIAGNOSIS — E78.2 MIXED HYPERLIPIDEMIA: ICD-10-CM

## 2025-04-08 DIAGNOSIS — E11.65 TYPE 2 DIABETES MELLITUS WITH HYPERGLYCEMIA, WITHOUT LONG-TERM CURRENT USE OF INSULIN (HCC): ICD-10-CM

## 2025-04-08 RX ORDER — EMPAGLIFLOZIN 10 MG/1
10 TABLET, FILM COATED ORAL DAILY
Qty: 90 TABLET | Refills: 0 | Status: SHIPPED | OUTPATIENT
Start: 2025-04-08

## 2025-04-08 RX ORDER — ROSUVASTATIN CALCIUM 5 MG/1
5 TABLET, COATED ORAL NIGHTLY
Qty: 90 TABLET | Refills: 1 | OUTPATIENT
Start: 2025-04-08

## 2025-04-21 DIAGNOSIS — I10 ESSENTIAL (PRIMARY) HYPERTENSION: ICD-10-CM

## 2025-04-22 ENCOUNTER — OFFICE VISIT (OUTPATIENT)
Age: 65
End: 2025-04-22
Payer: MEDICAID

## 2025-04-22 VITALS
DIASTOLIC BLOOD PRESSURE: 79 MMHG | OXYGEN SATURATION: 96 % | RESPIRATION RATE: 20 BRPM | WEIGHT: 222.3 LBS | TEMPERATURE: 98.1 F | HEIGHT: 66 IN | BODY MASS INDEX: 35.72 KG/M2 | HEART RATE: 85 BPM | SYSTOLIC BLOOD PRESSURE: 131 MMHG

## 2025-04-22 DIAGNOSIS — E66.812 CLASS 2 SEVERE OBESITY DUE TO EXCESS CALORIES WITH SERIOUS COMORBIDITY AND BODY MASS INDEX (BMI) OF 35.0 TO 35.9 IN ADULT (HCC): Primary | ICD-10-CM

## 2025-04-22 DIAGNOSIS — I10 ESSENTIAL (PRIMARY) HYPERTENSION: ICD-10-CM

## 2025-04-22 DIAGNOSIS — E11.65 TYPE 2 DIABETES MELLITUS WITH HYPERGLYCEMIA, WITHOUT LONG-TERM CURRENT USE OF INSULIN (HCC): ICD-10-CM

## 2025-04-22 DIAGNOSIS — E78.2 MIXED HYPERLIPIDEMIA: ICD-10-CM

## 2025-04-22 DIAGNOSIS — E66.01 CLASS 2 SEVERE OBESITY DUE TO EXCESS CALORIES WITH SERIOUS COMORBIDITY AND BODY MASS INDEX (BMI) OF 35.0 TO 35.9 IN ADULT (HCC): Primary | ICD-10-CM

## 2025-04-22 DIAGNOSIS — G47.33 OBSTRUCTIVE SLEEP APNEA SYNDROME: ICD-10-CM

## 2025-04-22 PROCEDURE — 3044F HG A1C LEVEL LT 7.0%: CPT | Performed by: FAMILY MEDICINE

## 2025-04-22 PROCEDURE — 99204 OFFICE O/P NEW MOD 45 MIN: CPT | Performed by: FAMILY MEDICINE

## 2025-04-22 PROCEDURE — 3078F DIAST BP <80 MM HG: CPT | Performed by: FAMILY MEDICINE

## 2025-04-22 PROCEDURE — 3075F SYST BP GE 130 - 139MM HG: CPT | Performed by: FAMILY MEDICINE

## 2025-04-22 RX ORDER — PRAZOSIN HYDROCHLORIDE 2 MG/1
CAPSULE ORAL
COMMUNITY
Start: 2025-04-09

## 2025-04-22 RX ORDER — LOSARTAN POTASSIUM 50 MG/1
50 TABLET ORAL DAILY
Qty: 90 TABLET | Refills: 0 | Status: SHIPPED | OUTPATIENT
Start: 2025-04-22

## 2025-04-22 ASSESSMENT — PATIENT HEALTH QUESTIONNAIRE - PHQ9
SUM OF ALL RESPONSES TO PHQ QUESTIONS 1-9: 0
1. LITTLE INTEREST OR PLEASURE IN DOING THINGS: NOT AT ALL
SUM OF ALL RESPONSES TO PHQ QUESTIONS 1-9: 0
2. FEELING DOWN, DEPRESSED OR HOPELESS: NOT AT ALL

## 2025-04-22 NOTE — PATIENT INSTRUCTIONS
Let’s have fun, stay strong, and feel good--together.  [facility]  [phone]  [email or web address]  Seated, Fit , Fun & Functional w/ Andreina 10am  Join Andreina for a fun, energetic low impact cardio-based chair fitness class.  [facility]  [phone]  [email or web address]  Water in Motion w/ Crystal 10am  A low-impact, high-energy shallow water workout for all ages and fitness levels! This dance-inspired cardio and strength class challenges every muscle group while being easy on the joints. Dive in for a fun, refreshing workout that keeps you moving and feeling great! *Proper swim attire encouraged.  [facility]  [phone]  [email or web address]  Joyful Tuesdays w/ Jan 11am * Class is closed to new participants*  Let Ms. Vasques jump start your cedrick each Tuesday. We will transform your mind, body and soul with low impact cardio with seated options, strengthening and flexibility exercises! Your body, mind and soul will thank you! Didn’t get enough? Come back and see her on Thursday  [facility]  [phone]  [email or web address]  Sit Fit w/ Shaye 12pm  A fun chair and floor class for all ages. We will move and groove to beats from the 70s, 80s and 90s while getting fit!  [facility]  [phone]  [email or web address]  Patt w/ Dimitri 5:30pm  A calorie-burning, dance fitness party combining all elements of fitness- cardio, muscle conditioning, balance and flexibility!  [employer]  [phone]  [email or web address]  Feeling Fit w/ Pat 6pm  Fun low impact to moderate workout to include Ledy, dance, mat and resistance training, All fitness levels are welcome.  Dago Chacon [facility]  [phone]  [email or web address]  Mariah w/ Camille 6pm  A calorie-burning, dance fitness party combining all elements of fitness- cardio, muscle conditioning, balance and

## 2025-04-22 NOTE — PROGRESS NOTES
[facility] Progress Note: Initial Physician Visit     Cass Waller is a 65 y.o. female who is here for medical screening for entering the [facility] Program.   The patient denies any disease state that requires protein restriction.    CC: class 2 Obesity    Referred by Dr YUSEF Rodriguez reason referred   to get better control of her weight      Starting weight 222  She realizes that after work she struggles with eating \" unnecessarily\"   The patient has been asking her pcp for medication for appetite- phentermine      Weight History  I personally reviewed the Medical Screening [facility] completed by patient and scanned into media section of chart.  It includes duration of their obesity, maximum weight, goal weight  all of which give the context of their obesity AND a Family History of their obesity.    Graduated highschool at 89    Started gaining weight with pregnancies. She has 9 children    Dad was overweight, mom was overweight also  Sisters is overweight        Weight loss History  I personally reviewed the Medical Screening [facility] completed by the patient and scanned into media section of chart.  It includes number of weight loss attempts, the weight loss program that patients was most successful using, and if they have any hx of anorectic medication use, including OTC supplements.     WW many yrs ago, and that just made her hungrier  Started working on her own at 89 and has lost to this point 222    Significant Medical History  Past Medical History:   Diagnosis Date    Adverse effect of anesthesia     WITHIN 20-30 MINUTES AFTER WAKING UP FROM CARPAL TUNNEL SURGERY, HAD DIFFICULTY BREATHING ,     Anemia     Anxiety     Glucose intolerance (impaired glucose tolerance)     Menopause 01/2018          Patient's medicactions that may contribute  Glipizide  Nortriptylline    Plan to become pregant within 6 months NA      I personally reviewed the Medical Screening

## 2025-04-22 NOTE — PROGRESS NOTES
Identified pt with two pt identifiers (name and ). Reviewed chart in preparation for visit and have obtained necessary documentation.    Cass Waller is a 65 y.o. female  Chief Complaint   Patient presents with    New Patient    Weight Management     /79 (BP Site: Right Upper Arm, Patient Position: Sitting, BP Cuff Size: Large Adult)   Pulse 85   Temp 98.1 °F (36.7 °C) (Oral)   Resp 20   Ht 1.676 m (5' 6\")   Wt 100.8 kg (222 lb 4.8 oz)   SpO2 96%   BMI 35.88 kg/m²     1. Have you been to the ER, urgent care clinic since your last visit?  Hospitalized since your last visit?no    2. Have you seen or consulted any other health care providers outside of the [facility] since your last visit?  Include any pap smears or colon screening. No    BMI - 35.8

## 2025-04-23 PROBLEM — N18.31 STAGE 3A CHRONIC KIDNEY DISEASE (HCC): Status: ACTIVE | Noted: 2025-04-23

## 2025-04-23 LAB
ALBUMIN SERPL-MCNC: 4 G/DL (ref 3.5–5)
ALBUMIN/GLOB SERPL: 1.2 (ref 1.1–2.2)
ALP SERPL-CCNC: 116 U/L (ref 45–117)
ALT SERPL-CCNC: 31 U/L (ref 12–78)
ANION GAP SERPL CALC-SCNC: 5 MMOL/L (ref 2–12)
AST SERPL-CCNC: 21 U/L (ref 15–37)
BILIRUB SERPL-MCNC: 0.7 MG/DL (ref 0.2–1)
BUN SERPL-MCNC: 14 MG/DL (ref 6–20)
BUN/CREAT SERPL: 13 (ref 12–20)
CALCIUM SERPL-MCNC: 9.5 MG/DL (ref 8.5–10.1)
CHLORIDE SERPL-SCNC: 104 MMOL/L (ref 97–108)
CO2 SERPL-SCNC: 29 MMOL/L (ref 21–32)
CREAT SERPL-MCNC: 1.07 MG/DL (ref 0.55–1.02)
ERYTHROCYTE [DISTWIDTH] IN BLOOD BY AUTOMATED COUNT: 11.7 % (ref 11.5–14.5)
EST. AVERAGE GLUCOSE BLD GHB EST-MCNC: 131 MG/DL
GLOBULIN SER CALC-MCNC: 3.4 G/DL (ref 2–4)
GLUCOSE SERPL-MCNC: 144 MG/DL (ref 65–100)
HBA1C MFR BLD: 6.2 % (ref 4–5.6)
HCT VFR BLD AUTO: 40.6 % (ref 35–47)
HGB BLD-MCNC: 12.5 G/DL (ref 11.5–16)
MCH RBC QN AUTO: 26.9 PG (ref 26–34)
MCHC RBC AUTO-ENTMCNC: 30.8 G/DL (ref 30–36.5)
MCV RBC AUTO: 87.3 FL (ref 80–99)
NRBC # BLD: 0 K/UL (ref 0–0.01)
NRBC BLD-RTO: 0 PER 100 WBC
PLATELET # BLD AUTO: 275 K/UL (ref 150–400)
PMV BLD AUTO: 9.9 FL (ref 8.9–12.9)
POTASSIUM SERPL-SCNC: 4.1 MMOL/L (ref 3.5–5.1)
PROT SERPL-MCNC: 7.4 G/DL (ref 6.4–8.2)
RBC # BLD AUTO: 4.65 M/UL (ref 3.8–5.2)
SODIUM SERPL-SCNC: 138 MMOL/L (ref 136–145)
WBC # BLD AUTO: 6.2 K/UL (ref 3.6–11)

## 2025-04-24 ENCOUNTER — TELEMEDICINE (OUTPATIENT)
Age: 65
End: 2025-04-24

## 2025-04-24 DIAGNOSIS — E66.812 CLASS 2 SEVERE OBESITY DUE TO EXCESS CALORIES WITH SERIOUS COMORBIDITY AND BODY MASS INDEX (BMI) OF 35.0 TO 35.9 IN ADULT (HCC): Primary | ICD-10-CM

## 2025-04-24 DIAGNOSIS — E66.01 CLASS 2 SEVERE OBESITY DUE TO EXCESS CALORIES WITH SERIOUS COMORBIDITY AND BODY MASS INDEX (BMI) OF 35.0 TO 35.9 IN ADULT (HCC): Primary | ICD-10-CM

## 2025-04-24 NOTE — PROGRESS NOTES
[facility]  Metabolic Program Initial Nutrition Consult    Date: 2025   Physician: Kindra Meneses MD  Name: Cass Waller  :  1960    Type of Plan: LCD   Weeks on Plan: week 1    Consent:  Patient and/or their healthcare decision maker is aware that this patient-initiated Telehealth or audio encounter is a complementary visit as part of the comprehensive VLCD/LCD meal replacement program offered at [facility].  Patient is aware if they discontinue the meal replacement program, all future RD visits with this provider will become a billable service, with coverage as determined by their insurance carrier.  Patient has provided verbal understanding and consent to proceed: yes, confirmed      Cass Waller, was evaluated through a synchronous (real-time) audio-video encounter.  This Virtual Visit was conducted with patient's (and/or legal guardian's) consent. Patient identification was verified, and a caregiver was present when appropriate.   The patient was located at Home:  [facility] [address]  Provider was located at Home (not Appt Dept State): [place]  Confirm you are appropriately licensed, registered, or certified to deliver care in the state where the patient is located as indicated above. If you are not or unsure, please re-schedule the visit: Yes, I confirm.      --CAROLE HEAD RD on 2025 at 10:32 AM    An electronic signature was used to authenticate this note.     ASSESSMENT:    Medications/Supplements:   Prior to Admission medications    Medication Sig Start Date End Date Taking? Authorizing Provider   losartan (COZAAR) 50 MG tablet TAKE 1 TABLET BY MOUTH EVERY DAY 25   Lorrie Rodriguez MD   prazosin (MINIPRESS) 2 MG capsule TAKE 1 CAPSULE BY MOUTH EVERYDAY AT BEDTIME 25   Richard Ribera MD   Cyanocobalamin (VITAMIN B-12 PO) Take 2 each by mouth daily Gummies    Richard Ribera MD

## 2025-04-25 ENCOUNTER — RESULTS FOLLOW-UP (OUTPATIENT)
Facility: CLINIC | Age: 65
End: 2025-04-25

## 2025-05-08 ENCOUNTER — OFFICE VISIT (OUTPATIENT)
Age: 65
End: 2025-05-08

## 2025-05-08 DIAGNOSIS — E66.812 CLASS 2 SEVERE OBESITY DUE TO EXCESS CALORIES WITH SERIOUS COMORBIDITY AND BODY MASS INDEX (BMI) OF 35.0 TO 35.9 IN ADULT (HCC): Primary | ICD-10-CM

## 2025-05-08 DIAGNOSIS — E66.01 CLASS 2 SEVERE OBESITY DUE TO EXCESS CALORIES WITH SERIOUS COMORBIDITY AND BODY MASS INDEX (BMI) OF 35.0 TO 35.9 IN ADULT (HCC): Primary | ICD-10-CM

## 2025-05-09 ENCOUNTER — CLINICAL SUPPORT (OUTPATIENT)
Age: 65
End: 2025-05-09

## 2025-05-09 VITALS
DIASTOLIC BLOOD PRESSURE: 72 MMHG | SYSTOLIC BLOOD PRESSURE: 108 MMHG | HEART RATE: 78 BPM | HEIGHT: 66 IN | RESPIRATION RATE: 16 BRPM | OXYGEN SATURATION: 95 % | TEMPERATURE: 98.2 F | BODY MASS INDEX: 34.7 KG/M2 | WEIGHT: 215.9 LBS

## 2025-05-09 DIAGNOSIS — E66.811 CLASS 1 OBESITY DUE TO EXCESS CALORIES WITH SERIOUS COMORBIDITY AND BODY MASS INDEX (BMI) OF 34.0 TO 34.9 IN ADULT: Primary | ICD-10-CM

## 2025-05-09 DIAGNOSIS — E78.2 MIXED HYPERLIPIDEMIA: ICD-10-CM

## 2025-05-09 DIAGNOSIS — G47.33 OBSTRUCTIVE SLEEP APNEA SYNDROME: ICD-10-CM

## 2025-05-09 DIAGNOSIS — E11.65 TYPE 2 DIABETES MELLITUS WITH HYPERGLYCEMIA, WITHOUT LONG-TERM CURRENT USE OF INSULIN (HCC): ICD-10-CM

## 2025-05-09 DIAGNOSIS — I10 ESSENTIAL (PRIMARY) HYPERTENSION: ICD-10-CM

## 2025-05-09 DIAGNOSIS — E66.09 CLASS 1 OBESITY DUE TO EXCESS CALORIES WITH SERIOUS COMORBIDITY AND BODY MASS INDEX (BMI) OF 34.0 TO 34.9 IN ADULT: Primary | ICD-10-CM

## 2025-05-09 NOTE — PROGRESS NOTES
Identified pt with two pt identifiers (name and ). Reviewed chart in preparation for visit and have obtained necessary documentation.    Cass Waller is a 65 y.o. female  Chief Complaint   Patient presents with    Weight Management     LCD     /72 (BP Site: Right Upper Arm, Patient Position: Sitting, BP Cuff Size: Large Adult)   Pulse 78   Temp 98.2 °F (36.8 °C) (Oral)   Resp 16   Ht 1.676 m (5' 6\")   Wt 97.9 kg (215 lb 14.4 oz)   SpO2 95%   BMI 34.85 kg/m²     1. Have you been to the ER, urgent care clinic since your last visit?  Hospitalized since your last visit?no    2. Have you seen or consulted any other health care providers outside of the [facility] since your last visit?  Include any pap smears or colon screening. no    Patient attended triage but did not bring homework form. Patient instructed to email or fax completed homework form to us. Patient informed that not bringing the homework form can result in not being seen next time.

## 2025-05-10 DIAGNOSIS — M79.89 OTHER SPECIFIED SOFT TISSUE DISORDERS: ICD-10-CM

## 2025-05-10 DIAGNOSIS — R60.0 BILATERAL LEG EDEMA: Primary | ICD-10-CM

## 2025-05-12 RX ORDER — FUROSEMIDE 20 MG/1
20 TABLET ORAL DAILY
Qty: 90 TABLET | Refills: 0 | Status: SHIPPED | OUTPATIENT
Start: 2025-05-12

## 2025-05-14 NOTE — PROGRESS NOTES
[facility]  Metabolic Weight Loss Program        Patient's Name: Cass Waller  : 1960    This patient is a participant at [facility] and attended the weekly virtual nutrition class hosted via [employer].      Payton Hall, MS, RD, LDN

## 2025-06-06 ENCOUNTER — TELEPHONE (OUTPATIENT)
Age: 65
End: 2025-06-06

## 2025-06-06 NOTE — TELEPHONE ENCOUNTER
Pt was called and verified using 2 identifiers. Pt was called for the VV and was informed that the clinic was running about 15-20 mins behind. If she was able to connected with the Provider now that would work but she will need to reschedule the appointment because she was in her way to work.  Pt was advised to call the office to reschedule but I would also make out PSR aware that she will need to reschedule. Pt appreciated the call.

## 2025-06-09 ENCOUNTER — TELEMEDICINE (OUTPATIENT)
Age: 65
End: 2025-06-09

## 2025-06-09 DIAGNOSIS — E66.09 CLASS 1 OBESITY DUE TO EXCESS CALORIES WITH SERIOUS COMORBIDITY AND BODY MASS INDEX (BMI) OF 34.0 TO 34.9 IN ADULT: Primary | ICD-10-CM

## 2025-06-09 DIAGNOSIS — E66.811 CLASS 1 OBESITY DUE TO EXCESS CALORIES WITH SERIOUS COMORBIDITY AND BODY MASS INDEX (BMI) OF 34.0 TO 34.9 IN ADULT: Primary | ICD-10-CM

## 2025-06-09 NOTE — PROGRESS NOTES
and fruit.  No sweet, no meat, no bread (white flour), no chips.  Fish and shrimp only.    24 Hour Diet Recall  Breakfast  Cream of wheat or grits 1 pack   Lunch  Equate product   Dinner  Equate product   Snacks  Boiled egg, fruit, 2-3 ounces fish with fruit   Beverages  Water 32 x 3      Barriers/concerns preventing patient from achieving goal(s) since last visit:  More energy,     NUTRITION INTERVENTION:  Pt educated on nutrition recommendations for the [facility] Low Calorie Plan (LCD), with the specific meal pattern of 2 meal replacements every day plus a grocery meal and snack.  Daily recommended totals: 1200 calories, 60 grams carbs, 80+ grams protein, and remaining calories as healthy fats.  Use LCD handout for meal and snack suggestions and preparation.    Grocery meal:  Use the balanced plate method to plan meals, include 3-6 oz of lean source of protein, unlimited non-starchy vegetables, 1/2 cup whole grains/beans OR 1/2 cup fruit OR 1 serving of low fat dairy. Utilize handouts listing healthy snack and meal ideas.     Read all nutrition labels. Demonstrated and emphasized identifying serving size, total fat, sugar and protein content. Defined low fat as </= 3 g per serving. Discussed lean and extra lean sources of protein. Avoid foods with sugar listed in the first 3 ingredients and >/10 g sugar per serving.     Consume meal replacements every three to four hours or pattern as discussed with provider.  Mix with water.  May add herbs/spices for taste.    Practice mindful eating habits; take small bites, chew thoroughly, avoid distractions, utilize hunger/fullness scale.     Reviewed attendance policy of attending weekly nutrition classes.  Metabolic support group recommended, and increase physical activity (approved per MD) for long term weight maintenance.      NUTRITION MONITORING AND EVALUATION: 7# loss x 1 month(s). Initially patient said no longer using our products, using Equate OTC shakes.  After

## 2025-06-10 ENCOUNTER — TRANSCRIBE ORDERS (OUTPATIENT)
Facility: HOSPITAL | Age: 65
End: 2025-06-10

## 2025-06-10 DIAGNOSIS — Z12.31 VISIT FOR SCREENING MAMMOGRAM: Primary | ICD-10-CM

## 2025-06-12 ENCOUNTER — OFFICE VISIT (OUTPATIENT)
Age: 65
End: 2025-06-12
Payer: MEDICAID

## 2025-06-12 VITALS
WEIGHT: 210.1 LBS | DIASTOLIC BLOOD PRESSURE: 69 MMHG | RESPIRATION RATE: 18 BRPM | SYSTOLIC BLOOD PRESSURE: 105 MMHG | HEIGHT: 66 IN | BODY MASS INDEX: 33.77 KG/M2 | OXYGEN SATURATION: 96 % | TEMPERATURE: 97.8 F | HEART RATE: 77 BPM

## 2025-06-12 DIAGNOSIS — E78.2 MIXED HYPERLIPIDEMIA: ICD-10-CM

## 2025-06-12 DIAGNOSIS — E11.65 TYPE 2 DIABETES MELLITUS WITH HYPERGLYCEMIA, WITHOUT LONG-TERM CURRENT USE OF INSULIN (HCC): ICD-10-CM

## 2025-06-12 DIAGNOSIS — I10 ESSENTIAL (PRIMARY) HYPERTENSION: ICD-10-CM

## 2025-06-12 DIAGNOSIS — G47.33 OBSTRUCTIVE SLEEP APNEA SYNDROME: ICD-10-CM

## 2025-06-12 DIAGNOSIS — E66.811 CLASS 1 OBESITY DUE TO EXCESS CALORIES WITH SERIOUS COMORBIDITY AND BODY MASS INDEX (BMI) OF 34.0 TO 34.9 IN ADULT: Primary | ICD-10-CM

## 2025-06-12 DIAGNOSIS — E66.09 CLASS 1 OBESITY DUE TO EXCESS CALORIES WITH SERIOUS COMORBIDITY AND BODY MASS INDEX (BMI) OF 34.0 TO 34.9 IN ADULT: Primary | ICD-10-CM

## 2025-06-12 PROCEDURE — 3078F DIAST BP <80 MM HG: CPT | Performed by: FAMILY MEDICINE

## 2025-06-12 PROCEDURE — 99214 OFFICE O/P EST MOD 30 MIN: CPT | Performed by: FAMILY MEDICINE

## 2025-06-12 PROCEDURE — 3074F SYST BP LT 130 MM HG: CPT | Performed by: FAMILY MEDICINE

## 2025-06-12 PROCEDURE — 3044F HG A1C LEVEL LT 7.0%: CPT | Performed by: FAMILY MEDICINE

## 2025-06-12 RX ORDER — TOPIRAMATE 25 MG/1
25 TABLET, FILM COATED ORAL
Qty: 90 TABLET | Refills: 1 | Status: SHIPPED | OUTPATIENT
Start: 2025-06-12

## 2025-06-12 ASSESSMENT — PATIENT HEALTH QUESTIONNAIRE - PHQ9
SUM OF ALL RESPONSES TO PHQ QUESTIONS 1-9: 0
SUM OF ALL RESPONSES TO PHQ QUESTIONS 1-9: 0
2. FEELING DOWN, DEPRESSED OR HOPELESS: NOT AT ALL
SUM OF ALL RESPONSES TO PHQ QUESTIONS 1-9: 0
SUM OF ALL RESPONSES TO PHQ QUESTIONS 1-9: 0
1. LITTLE INTEREST OR PLEASURE IN DOING THINGS: NOT AT ALL

## 2025-06-12 NOTE — PROGRESS NOTES
[facility] Progress Note:   F/up Physician Visit    CC: Weight Management      Cass Waller is a 65 y.o. female who is here for her  f/up physician visit for the / LCD Program.    Wellbutrin is not controlling the cravings enough  She is taking the 150 XL which was prescribed by psychiatry    She has stopped taking the glipizide , blood sugar was dropping to 70    She has  stopped Taking gabapentin since last month so she could try topamax  April 222  Now 210 6/12/2025     1:01 PM 6/12/2025     1:00 PM 5/9/2025     8:07 AM 4/22/2025     1:20 PM 4/22/2025     1:00 PM 3/10/2025     9:02 AM 11/7/2024     1:01 PM   Weight Metrics   Weight 210 lb 1.6 oz  215 lb 14.4 oz 222 lb 4.8 oz  221 lb 8 oz 226 lb 6.4 oz   Neck (Inches)  15 in   15 in     Waist Measure Inches  46 in   47.5 in     Body Fat %  41.8 %   43.3 %     BMI (Calculated) 34 kg/m2  34.9 kg/m2 36 kg/m2  35.8 kg/m2 36.6 kg/m2          No data to display                   Current Outpatient Medications   Medication Sig Dispense Refill    topiramate (TOPAMAX) 25 MG tablet Take 1 tablet by mouth Daily with supper 90 tablet 1    furosemide (LASIX) 20 MG tablet TAKE 1 TABLET BY MOUTH EVERY DAY 90 tablet 0    losartan (COZAAR) 50 MG tablet TAKE 1 TABLET BY MOUTH EVERY DAY 90 tablet 0    prazosin (MINIPRESS) 2 MG capsule TAKE 1 CAPSULE BY MOUTH EVERYDAY AT BEDTIME      Cyanocobalamin (VITAMIN B-12 PO) Take 2 each by mouth daily Gummies      empagliflozin (JARDIANCE) 10 MG tablet TAKE 1 TABLET BY MOUTH EVERY DAY 90 tablet 0    Dulaglutide (TRULICITY) 4.5 MG/0.5ML SOAJ INJECT 4.5 MG INTO THE SKIN ONCE A WEEK 2 mL 3    ciclopirox (PENLAC) 8 % solution Apply topically as needed      pantoprazole (PROTONIX) 40 MG tablet Take 1 tablet by mouth every morning (before breakfast) 90 tablet 0    ezetimibe (ZETIA) 10 MG tablet TAKE 1 TABLET BY MOUTH EVERY DAY 90 tablet 1    rosuvastatin (CRESTOR) 5 MG tablet TAKE 1 TABLET BY MOUTH EVERY DAY AT

## 2025-06-12 NOTE — PATIENT INSTRUCTIONS
balance. Come as you are--bring your energy, a smile, and maybe a friend! Let’s have fun, stay strong, and feel good--together.  [facility]  [phone]  [email or web address]  Seated, Fit , Fun & Functional w/ Andreina 10am * No Class 6/2*  Join Andreina for a fun, energetic low impact cardio-based chair fitness class.  [place] [facility]  [phone]  [email or web address]  Joyful Tuesdays w/ Jan 11am * Class is closed to new participants* Class will resume on June 17  Let Ms. Layo giordano start your cedrick each Tuesday. We will transform your mind, body and soul with low impact cardio with seated options, strengthening and flexibility exercises! Your body, mind and soul will thank you! Didn’t get enough? Come back and see her on Thursday  [facility]  [phone]  [email or web address]  Sit Fit w/ Shaye 12pm  A fun chair and floor class for all ages. We will move and groove to beats from the 70s, 80s and 90s while getting fit!  [facility]  [phone]  [email or web address]  Patt ray/ Dimitri 5:30pm  A calorie-burning, dance fitness party combining all elements of fitness- cardio, muscle conditioning, balance and flexibility!  [facility]  [phone]  [email or web address]  Feeling Fit w/ Pat 6pm  Fun low impact to moderate workout to include Ledy, dance, mat and resistance training, All fitness levels are welcome.  Dago Chacon [facility]  [phone]  [email or web address]  Mariah w/ Camille 6pm  A calorie-burning, dance fitness party combining all elements of fitness- cardio, muscle conditioning, balance and flexibility!  [place]  [phone]  [email or web address]  [employer] w/ Mason 6pm  Are you ready to kick up your fitness routine? Join Mason for a heart pumping 45-minute workout set to get you moving and challenge your inner beast.  [place] [facility]

## 2025-06-12 NOTE — PROGRESS NOTES
Identified pt with two pt identifiers (name and ). Reviewed chart in preparation for visit and have obtained necessary documentation.    Cass Waller is a 65 y.o. female  Chief Complaint   Patient presents with    Weight Management     6 week follow up     /69 (BP Site: Right Upper Arm, Patient Position: Sitting, BP Cuff Size: Large Adult)   Pulse 77   Temp 97.8 °F (36.6 °C) (Oral)   Resp 18   Ht 1.676 m (5' 6\")   Wt 95.3 kg (210 lb 1.6 oz)   SpO2 96%   BMI 33.91 kg/m²     1. Have you been to the ER, urgent care clinic since your last visit?  Hospitalized since your last visit?no    2. Have you seen or consulted any other health care providers outside of the [facility] since your last visit?  Include any pap smears or colon screening. No    BMI - 33.8

## 2025-06-14 DIAGNOSIS — K21.9 GASTROESOPHAGEAL REFLUX DISEASE, UNSPECIFIED WHETHER ESOPHAGITIS PRESENT: ICD-10-CM

## 2025-06-16 RX ORDER — PANTOPRAZOLE SODIUM 40 MG/1
40 TABLET, DELAYED RELEASE ORAL
Qty: 90 TABLET | Refills: 0 | Status: SHIPPED | OUTPATIENT
Start: 2025-06-16

## 2025-06-27 ENCOUNTER — HOSPITAL ENCOUNTER (OUTPATIENT)
Facility: HOSPITAL | Age: 65
Discharge: HOME | End: 2025-06-30
Attending: STUDENT IN AN ORGANIZED HEALTH CARE EDUCATION/TRAINING PROGRAM
Payer: MEDICAID

## 2025-06-27 VITALS — HEIGHT: 66 IN | WEIGHT: 210 LBS | BODY MASS INDEX: 33.75 KG/M2

## 2025-06-27 DIAGNOSIS — Z12.31 VISIT FOR SCREENING MAMMOGRAM: ICD-10-CM

## 2025-06-27 PROCEDURE — 77063 BREAST TOMOSYNTHESIS BI: CPT

## 2025-07-07 DIAGNOSIS — E11.65 TYPE 2 DIABETES MELLITUS WITH HYPERGLYCEMIA, WITHOUT LONG-TERM CURRENT USE OF INSULIN (HCC): ICD-10-CM

## 2025-07-07 RX ORDER — EMPAGLIFLOZIN 10 MG/1
10 TABLET, FILM COATED ORAL DAILY
Qty: 90 TABLET | Refills: 0 | Status: SHIPPED | OUTPATIENT
Start: 2025-07-07

## 2025-07-11 ENCOUNTER — CLINICAL SUPPORT (OUTPATIENT)
Age: 65
End: 2025-07-11

## 2025-07-11 VITALS
SYSTOLIC BLOOD PRESSURE: 102 MMHG | HEART RATE: 75 BPM | TEMPERATURE: 98.2 F | RESPIRATION RATE: 16 BRPM | WEIGHT: 206.4 LBS | OXYGEN SATURATION: 97 % | DIASTOLIC BLOOD PRESSURE: 67 MMHG | HEIGHT: 66 IN | BODY MASS INDEX: 33.17 KG/M2

## 2025-07-11 DIAGNOSIS — G47.33 OBSTRUCTIVE SLEEP APNEA SYNDROME: ICD-10-CM

## 2025-07-11 DIAGNOSIS — E11.65 TYPE 2 DIABETES MELLITUS WITH HYPERGLYCEMIA, WITHOUT LONG-TERM CURRENT USE OF INSULIN (HCC): ICD-10-CM

## 2025-07-11 DIAGNOSIS — I10 ESSENTIAL (PRIMARY) HYPERTENSION: ICD-10-CM

## 2025-07-11 DIAGNOSIS — E78.2 MIXED HYPERLIPIDEMIA: ICD-10-CM

## 2025-07-11 DIAGNOSIS — E66.811 CLASS 1 OBESITY DUE TO EXCESS CALORIES WITH SERIOUS COMORBIDITY AND BODY MASS INDEX (BMI) OF 33.0 TO 33.9 IN ADULT: Primary | ICD-10-CM

## 2025-07-11 DIAGNOSIS — E66.09 CLASS 1 OBESITY DUE TO EXCESS CALORIES WITH SERIOUS COMORBIDITY AND BODY MASS INDEX (BMI) OF 33.0 TO 33.9 IN ADULT: Primary | ICD-10-CM

## 2025-07-11 RX ORDER — DOXYCYCLINE 100 MG/1
100 CAPSULE ORAL 2 TIMES DAILY
COMMUNITY

## 2025-07-11 RX ORDER — FAMOTIDINE 20 MG/1
20 TABLET, FILM COATED ORAL
COMMUNITY

## 2025-07-11 RX ORDER — AMMONIUM LACTATE 12 G/100G
LOTION TOPICAL 2 TIMES DAILY
COMMUNITY

## 2025-07-11 RX ORDER — CHLORAL HYDRATE 500 MG
CAPSULE ORAL DAILY
COMMUNITY

## 2025-07-11 RX ORDER — DICYCLOMINE HCL 20 MG
20 TABLET ORAL EVERY 6 HOURS
COMMUNITY

## 2025-07-11 RX ORDER — GLIPIZIDE 10 MG/1
10 TABLET ORAL 2 TIMES DAILY
COMMUNITY

## 2025-07-11 ASSESSMENT — PATIENT HEALTH QUESTIONNAIRE - PHQ9
SUM OF ALL RESPONSES TO PHQ QUESTIONS 1-9: 0
2. FEELING DOWN, DEPRESSED OR HOPELESS: NOT AT ALL
1. LITTLE INTEREST OR PLEASURE IN DOING THINGS: NOT AT ALL

## 2025-07-12 DIAGNOSIS — E78.2 MIXED HYPERLIPIDEMIA: ICD-10-CM

## 2025-07-12 DIAGNOSIS — I10 ESSENTIAL (PRIMARY) HYPERTENSION: ICD-10-CM

## 2025-07-12 DIAGNOSIS — E11.65 TYPE 2 DIABETES MELLITUS WITH HYPERGLYCEMIA, WITHOUT LONG-TERM CURRENT USE OF INSULIN (HCC): ICD-10-CM

## 2025-07-15 RX ORDER — ROSUVASTATIN CALCIUM 5 MG/1
5 TABLET, COATED ORAL NIGHTLY
Qty: 90 TABLET | Refills: 1 | Status: SHIPPED | OUTPATIENT
Start: 2025-07-15

## 2025-07-15 RX ORDER — EZETIMIBE 10 MG/1
10 TABLET ORAL DAILY
Qty: 90 TABLET | Refills: 1 | Status: SHIPPED | OUTPATIENT
Start: 2025-07-15

## 2025-07-15 RX ORDER — DULAGLUTIDE 4.5 MG/.5ML
INJECTION, SOLUTION SUBCUTANEOUS
Qty: 2 ML | Refills: 2 | Status: SHIPPED | OUTPATIENT
Start: 2025-07-15

## 2025-07-15 RX ORDER — LOSARTAN POTASSIUM 50 MG/1
50 TABLET ORAL DAILY
Qty: 90 TABLET | Refills: 0 | Status: SHIPPED | OUTPATIENT
Start: 2025-07-15

## 2025-07-18 NOTE — PROGRESS NOTES
Identified pt with two pt identifiers (name and ). Reviewed chart in preparation for visit and have obtained necessary documentation.    Cass Waller is a 65 y.o. female  Chief Complaint   Patient presents with    Weight Management     LCD/ Gideon      /67 (BP Site: Right Upper Arm, Patient Position: Sitting, BP Cuff Size: Large Adult)   Pulse 75   Temp 98.2 °F (36.8 °C) (Oral)   Resp 16   Ht 1.676 m (5' 6\")   Wt 93.6 kg (206 lb 6.4 oz)   SpO2 97%   BMI 33.31 kg/m²     1. Have you been to the ER, urgent care clinic since your last visit?  Hospitalized since your last visit?no    2. Have you seen or consulted any other health care providers outside of the [facility] since your last visit?  Include any pap smears or colon screening. No    Patient attended triage but did not bring homework form. Patient instructed to email or fax completed homework form to us. Patient informed that not bringing the homework form can result in not being seen next time.       
     escitalopram (LEXAPRO) 20 MG tablet Take 1 tablet by mouth daily      ketoconazole (NIZORAL) 2 % cream Apply topically as needed      Multiple Vitamin (MULTIVITAMIN) tablet Take 1 tablet by mouth daily      traZODone (DESYREL) 50 MG tablet Take 1 tablet by mouth nightly as needed      Lancets MISC 1 each by Does not apply route 2 times daily Check blood glucose daily ONE TOUCH 300 each 1    budesonide-formoterol (SYMBICORT) 80-4.5 MCG/ACT AERO Inhale 2 puffs into the lungs 2 times daily      vitamin D (CHOLECALCIFEROL) 25 MCG (1000 UT) TABS tablet Take 1 tablet by mouth daily      ferrous sulfate (IRON 325) 325 (65 Fe) MG tablet Take 1 tablet by mouth every morning (before breakfast)      etodolac (LODINE) 400 MG tablet Take 1 tablet by mouth 2 times daily as needed (pain)      latanoprost (XALATAN) 0.005 % ophthalmic solution Place 1 drop into both eyes nightly      GEMTESA 75 MG TABS tablet Take 1 tablet by mouth daily      rosuvastatin (CRESTOR) 5 MG tablet TAKE 1 TABLET BY MOUTH EVERY DAY AT NIGHT 90 tablet 1    Dulaglutide (TRULICITY) 4.5 MG/0.5ML SOAJ INJECT 4.5 MG INTO THE SKIN ONCE A WEEK 2 mL 2    ezetimibe (ZETIA) 10 MG tablet TAKE 1 TABLET BY MOUTH EVERY DAY 90 tablet 1    losartan (COZAAR) 50 MG tablet TAKE 1 TABLET BY MOUTH EVERY DAY 90 tablet 0    famotidine (PEPCID) 20 MG tablet Take 1 tablet by mouth      pantoprazole (PROTONIX) 40 MG tablet TAKE 1 TABLET BY MOUTH EVERY DAY BEFORE BREAKFAST 90 tablet 0    gabapentin (NEURONTIN) 600 MG tablet Take 1 tablet by mouth 2 times daily. (Patient not taking: Reported on 7/11/2025)      SUMAtriptan (IMITREX) 50 MG tablet Take 1 tablet by mouth daily as needed for Migraine       No current facility-administered medications for this visit.

## 2025-08-04 ENCOUNTER — OFFICE VISIT (OUTPATIENT)
Facility: CLINIC | Age: 65
End: 2025-08-04
Payer: MEDICAID

## 2025-08-04 VITALS
TEMPERATURE: 98 F | HEIGHT: 66 IN | HEART RATE: 84 BPM | WEIGHT: 196.6 LBS | DIASTOLIC BLOOD PRESSURE: 65 MMHG | BODY MASS INDEX: 31.6 KG/M2 | OXYGEN SATURATION: 98 % | SYSTOLIC BLOOD PRESSURE: 103 MMHG | RESPIRATION RATE: 17 BRPM

## 2025-08-04 DIAGNOSIS — H61.23 BILATERAL IMPACTED CERUMEN: ICD-10-CM

## 2025-08-04 DIAGNOSIS — E55.9 VITAMIN D DEFICIENCY: ICD-10-CM

## 2025-08-04 DIAGNOSIS — R25.3 FASCICULATIONS: ICD-10-CM

## 2025-08-04 DIAGNOSIS — I10 ESSENTIAL (PRIMARY) HYPERTENSION: ICD-10-CM

## 2025-08-04 DIAGNOSIS — E11.65 TYPE 2 DIABETES MELLITUS WITH HYPERGLYCEMIA, WITHOUT LONG-TERM CURRENT USE OF INSULIN (HCC): ICD-10-CM

## 2025-08-04 DIAGNOSIS — Z00.00 ANNUAL PHYSICAL EXAM: Primary | ICD-10-CM

## 2025-08-04 PROCEDURE — 3078F DIAST BP <80 MM HG: CPT | Performed by: STUDENT IN AN ORGANIZED HEALTH CARE EDUCATION/TRAINING PROGRAM

## 2025-08-04 PROCEDURE — 99396 PREV VISIT EST AGE 40-64: CPT | Performed by: STUDENT IN AN ORGANIZED HEALTH CARE EDUCATION/TRAINING PROGRAM

## 2025-08-04 PROCEDURE — 69209 REMOVE IMPACTED EAR WAX UNI: CPT | Performed by: STUDENT IN AN ORGANIZED HEALTH CARE EDUCATION/TRAINING PROGRAM

## 2025-08-04 PROCEDURE — 3074F SYST BP LT 130 MM HG: CPT | Performed by: STUDENT IN AN ORGANIZED HEALTH CARE EDUCATION/TRAINING PROGRAM

## 2025-08-04 PROCEDURE — 99214 OFFICE O/P EST MOD 30 MIN: CPT | Performed by: STUDENT IN AN ORGANIZED HEALTH CARE EDUCATION/TRAINING PROGRAM

## 2025-08-04 ASSESSMENT — ENCOUNTER SYMPTOMS
NAUSEA: 0
ABDOMINAL PAIN: 0
SHORTNESS OF BREATH: 0
RHINORRHEA: 0
VOMITING: 0
COUGH: 0

## 2025-08-12 DIAGNOSIS — E11.65 TYPE 2 DIABETES MELLITUS WITH HYPERGLYCEMIA, WITHOUT LONG-TERM CURRENT USE OF INSULIN (HCC): ICD-10-CM

## 2025-08-12 DIAGNOSIS — R60.0 BILATERAL LEG EDEMA: ICD-10-CM

## 2025-08-12 DIAGNOSIS — K21.9 GASTROESOPHAGEAL REFLUX DISEASE, UNSPECIFIED WHETHER ESOPHAGITIS PRESENT: ICD-10-CM

## 2025-08-12 RX ORDER — FUROSEMIDE 20 MG/1
20 TABLET ORAL DAILY
Qty: 90 TABLET | Refills: 0 | Status: SHIPPED | OUTPATIENT
Start: 2025-08-12

## 2025-08-12 RX ORDER — PANTOPRAZOLE SODIUM 40 MG/1
40 TABLET, DELAYED RELEASE ORAL
Qty: 90 TABLET | Refills: 0 | Status: SHIPPED | OUTPATIENT
Start: 2025-08-12

## (undated) DEVICE — Device

## (undated) DEVICE — TUBING HYDR IRR --

## (undated) DEVICE — SYRINGE MED 10ML LUERLOCK TIP W/O SFTY DISP

## (undated) DEVICE — FCPS RAD JAW 4LC 240CM W/NDL -- BX/40

## (undated) DEVICE — CUFF BLD PRSS AD CLTH SGL TB W/ BAYNT CONN ROUNDED CORNER

## (undated) DEVICE — BOUGIE ESOPH MLNY 54 FR TAPR TIP TUNGSTEN FILLED SIL NS

## (undated) DEVICE — SYRINGE 50ML E/T